# Patient Record
Sex: FEMALE | Race: WHITE | NOT HISPANIC OR LATINO | Employment: OTHER | URBAN - METROPOLITAN AREA
[De-identification: names, ages, dates, MRNs, and addresses within clinical notes are randomized per-mention and may not be internally consistent; named-entity substitution may affect disease eponyms.]

---

## 2018-10-18 ENCOUNTER — OFFICE VISIT (OUTPATIENT)
Dept: NEUROSURGERY | Facility: CLINIC | Age: 63
End: 2018-10-18

## 2018-10-18 VITALS — BODY MASS INDEX: 30.7 KG/M2 | HEIGHT: 66 IN | WEIGHT: 191 LBS | RESPIRATION RATE: 18 BRPM

## 2018-10-18 DIAGNOSIS — M48.04 SPINAL STENOSIS OF THORACIC REGION: ICD-10-CM

## 2018-10-18 DIAGNOSIS — M51.36 DDD (DEGENERATIVE DISC DISEASE), LUMBAR: ICD-10-CM

## 2018-10-18 DIAGNOSIS — M51.26 HNP (HERNIATED NUCLEUS PULPOSUS), LUMBAR: ICD-10-CM

## 2018-10-18 DIAGNOSIS — M47.819 MULTILEVEL FACET ARTHRITIS: ICD-10-CM

## 2018-10-18 DIAGNOSIS — M51.34 DDD (DEGENERATIVE DISC DISEASE), THORACIC: ICD-10-CM

## 2018-10-18 DIAGNOSIS — G80.1 SPASTIC DIPLEGIC CEREBRAL PALSY (HCC): ICD-10-CM

## 2018-10-18 DIAGNOSIS — G54.3 THORACIC ROOT LESION: Primary | ICD-10-CM

## 2018-10-18 PROBLEM — G80.9 CEREBRAL PALSY: Status: ACTIVE | Noted: 2018-10-18

## 2018-10-18 PROCEDURE — 99203 OFFICE O/P NEW LOW 30 MIN: CPT | Performed by: NEUROLOGICAL SURGERY

## 2018-10-18 RX ORDER — CELECOXIB 200 MG/1
200 CAPSULE ORAL DAILY
Refills: 11 | COMMUNITY
Start: 2018-08-29

## 2018-10-18 RX ORDER — LATANOPROST 50 UG/ML
1 SOLUTION/ DROPS OPHTHALMIC NIGHTLY
Refills: 11 | COMMUNITY
Start: 2018-09-08

## 2018-10-18 RX ORDER — CLONIDINE HYDROCHLORIDE 0.1 MG/1
0.1 TABLET ORAL
Refills: 0 | COMMUNITY
Start: 2018-09-20

## 2018-10-18 RX ORDER — TIZANIDINE 4 MG/1
TABLET ORAL
Refills: 1 | Status: ON HOLD | COMMUNITY
Start: 2018-10-05 | End: 2019-01-23

## 2018-10-18 RX ORDER — PREGABALIN 150 MG/1
150 CAPSULE ORAL
COMMUNITY
Start: 2008-03-12

## 2018-10-18 NOTE — PROGRESS NOTES
NAME: KADE CASH   DOS: 10/18/2018  : 1955  PCP: Felix Dahl MD    Chief Complaint:  Back pain left hip pain  Chief Complaint   Patient presents with   • Back Pain       History of Present Illness:  63 y.o. female   Is a very pleasant 63-year-old female with a complex history of cerebral palsy.  She's very active and has had quite a few at the inflow type of episodes ambulatory efforts and exercise.  She reports a pattern of increasing utilization to where she can walk even with the assistance of crutches but then will be injured and be off of her crutches for a number of months.  She reported that she saw surgeon back about 3 years ago for evaluation of spinal stenosis after a visit with Dr. Olguin and he recommended surgery but she did not pursue it.    She has a history of intermittent bowel bladder incontinence issues but nothing has changed most recently she has developed left-sided paraspinal left-sided upper hip and L5 proximal radicular type of pain doesn't radiate down the leg it's not associated with worsening foot drop and she has been limiting her activities she hasn't been through any physical therapy she's on her same medicines and her pain right now radiates and it ranges from an 2-5 out of 10    PMHX  Allergies:  Allergies   Allergen Reactions   • Percocet [Oxycodone-Acetaminophen] Hives     Medications    Current Outpatient Prescriptions:   •  pregabalin (LYRICA) 150 MG capsule, Take  by mouth., Disp: , Rfl:   •  celecoxib (CeleBREX) 200 MG capsule, TAKE 1 CAPSULE BY MOUTH EVERY DAY WITH FOOD, Disp: , Rfl: 11  •  CloNIDine (CATAPRES) 0.1 MG tablet, Take 0.1 mg by mouth 2 (Two) Times a Day., Disp: , Rfl: 0  •  latanoprost (XALATAN) 0.005 % ophthalmic solution, INSTILL 1 DROP IN EACH EYE AT BEDTIME, Disp: , Rfl: 11  •  tiZANidine (ZANAFLEX) 4 MG tablet, TAKE 3 TABLETS DAILY AT BEDTIME, Disp: , Rfl: 1  Past Medical History:  Past Medical History:   Diagnosis Date   • Arthritis    •  Cerebral palsy (CMS/HCC)    • Hypertension      Past Surgical History:  History reviewed. No pertinent surgical history.  Social Hx:  Social History   Substance Use Topics   • Smoking status: Never Smoker   • Smokeless tobacco: Never Used   • Alcohol use Yes     Family Hx:  Family History   Problem Relation Age of Onset   • Family history unknown: Yes     Review of Systems:        Review of Systems   Constitutional: Negative for activity change, appetite change, chills, diaphoresis, fatigue, fever and unexpected weight change.   HENT: Negative for congestion, dental problem, drooling, ear discharge, ear pain, facial swelling, hearing loss, mouth sores, nosebleeds, postnasal drip, rhinorrhea, sinus pressure, sneezing, sore throat, tinnitus, trouble swallowing and voice change.    Eyes: Negative for photophobia, pain, discharge, redness, itching and visual disturbance.   Respiratory: Negative for apnea, cough, choking, chest tightness, shortness of breath, wheezing and stridor.    Cardiovascular: Negative for chest pain, palpitations and leg swelling.   Gastrointestinal: Negative for abdominal distention, abdominal pain, anal bleeding, blood in stool, constipation, diarrhea, nausea, rectal pain and vomiting.   Endocrine: Negative for cold intolerance, heat intolerance, polydipsia, polyphagia and polyuria.   Genitourinary: Negative for decreased urine volume, difficulty urinating, dysuria, enuresis, flank pain, frequency, genital sores, hematuria and urgency.   Musculoskeletal: Positive for back pain. Negative for arthralgias, gait problem, joint swelling, myalgias, neck pain and neck stiffness.   Skin: Negative for color change, pallor, rash and wound.   Allergic/Immunologic: Negative for environmental allergies, food allergies and immunocompromised state.   Neurological: Negative for dizziness, tremors, seizures, syncope, facial asymmetry, speech difficulty, weakness, light-headedness, numbness and headaches.    Hematological: Negative for adenopathy. Does not bruise/bleed easily.   Psychiatric/Behavioral: Negative for agitation, behavioral problems, confusion, decreased concentration, dysphoric mood, hallucinations, self-injury, sleep disturbance and suicidal ideas. The patient is not nervous/anxious and is not hyperactive.    All other systems reviewed and are negative.       I have reviewed this note template and all pertinent parts of the review of systems social, family history, surgical history and medication list    Physical Examination:  Vitals:    10/18/18 1311   Resp: 18      General Appearance:   Well developed, well nourished, well groomed, alert, and cooperative.  Neurological examination:  Neurologic Exam  Vital signs were reviewed and documented in the chart  Patient appeared in good neurologic function with normal comprehension fluent speech  Mood and affect are normal  Sense of smell deferred    Pupils symmetric equally reactive funduscopic exam not visualized   Visual fields intact to confrontation  Extraocular movements intact  Face motor function is symmetric  Facial sensations normal  Hearing intact to finger rub hearing intact to finger rub  Tongue is midline  Palate symmetric  Swallowing normal  Shoulder shrug normal    Muscle bulk and tone normal  5 out of 5 strength no motor drift upper extremities  Gait normal intact  Negative Romberg  No clonus long tract signs or myelopathy  She has bilateral Raymond's    She is relatively areflexic in her lower extremities she has weakness in her left plantar flexor that's 1-2 out of 5          Review of Imaging/DATA:  Reviewed her MRI of her thoracic and lumbar spine there's a dorsal lesion mentioned on her scans at the T10 11 area additionally her lumbar spine has L4 5 spinal stenosis  Diagnoses/Plan:    Ms. Beltre is a 63 y.o. female   \Probable incidental [place]'s at the midthoracic area without evidence of compression and seen at the 1011 area.  She has what  I would consider to be operative spinal stenosis at L4 5 but it certainly doesn't explain her recent worsening.  She does need a cervical spine MRI from my standpoint I recommended a workup that would include hip injections a lumbar epidural steroid block and physical therapy we can contemplate an L4 decompressive laminectomy of needed and explained the risks and benefits and expected outcome from the surgery should we get to that point.  Spent a pleasure to provide neurosurgical care

## 2018-10-22 ENCOUNTER — TELEPHONE (OUTPATIENT)
Dept: NEUROSURGERY | Facility: CLINIC | Age: 63
End: 2018-10-22

## 2018-10-22 RX ORDER — DIAZEPAM 10 MG/1
10 TABLET ORAL TAKE AS DIRECTED
Qty: 2 TABLET | Refills: 0 | Status: SHIPPED | OUTPATIENT
Start: 2018-10-22 | End: 2019-01-16

## 2018-10-22 NOTE — TELEPHONE ENCOUNTER
Provider: Phil   Caller: angella   Time of call: [id number]    Phone #: [phone]   Last visit:10/18/18     Next visit: 12/03/2018           Reason for call: Pt states she is scheduled to have an MRI tomorrow and was told she could have medicine to help her relax beforehand.   [facility]          Pt called again at 9:56 requesting meds for her MRI

## 2018-10-24 ENCOUNTER — TELEPHONE (OUTPATIENT)
Dept: PAIN MEDICINE | Facility: CLINIC | Age: 63
End: 2018-10-24

## 2018-10-24 NOTE — TELEPHONE ENCOUNTER
[facility] Report #[id number]   MRI cervical spine, 10/23/2018: Cervical:  Normal cervical alignment is demonstrated.  Vertebral heights are well maintained.  Craniocervical junction is unremarkable.  There is moderate degenerative changes in the mid cervical spine.  Bone marrow signal is within normal limits, no suspicious osseous lesion is identified.  Prevertebral and paraspinal soft tissues are within normal limits.  Visualized portions of the posterior fossa are unremarkable.  Cervical cord demonstrates normal course, caliber, and signal characteristics.  No epidural fluid collection or hematoma is identified.  No area of abnormal enhancement is identified.    At C2-C3 there is no significant disc herniation or protrusion.  No central canal or neural foraminal stenosis is demonstrated.  C3-C4 reveals diffuse disc osteophyte resulting in moderate to advanced central canal stenosis and advance left neural foraminal narrowing seen.  There is moderate right neural foraminal narrowing.  C4-C5 reveals diffuse disc osteophyte and uncovertebral degenerative change causing mild to moderate central canal stenosis and advanced bilateral neural foraminal narrowing.  C5-C6 reveals ossification posterior to the C5-C6 level along with central disc also resulting in severe advanced central canal stenosis.  There is advanced bilateral neural foraminal narrowing.  C6-C7 reveals diffuse disc osteophyte and uncovertebral degenerative change causing advanced bilateral neural foraminal narrowing and moderate to advanced central canal stenosis.  At C7-T1 there is no significant disc herniation or protrusion.  No central canal or neural foraminal stenosis is demonstrated.    X-ray lumbar spine, 10/23/2018: Lateral shin and extension views of the lumbar spine are performed.  Alignment is anatomic.  There is no evidence of abnormal subluxation with the lateral flexion and extension views of the lumbar spine.  Vertebral body height is normal.   No compression anomalies are identified.  There is no evidence of spondylolisthesis.  There is moderate degenerative disc space disease at levels of L2-L3 as well as L3-L4.  Spurring is identified along the L2-L4 vertebrae.    MRI Thoracic Spine, 10/23/2018: Thoracic:  Normal alignment is demonstrated.  Vertebral heights are well-maintained.  Mild degenerative change of the thoracic spine without acute fracture or significant central canal compromise.  The marrow signal is within normal limits, and no suspicious osseous lesion is identified.  Prevertebral and paraspinal soft tissues are within normal limits.  T8 vertebral body hemangioma present.Posteroinferior T11 vertebral body hemangioma.  Previously described abnormality T 10-11 level not seen on current exam appears to be related to prominent ligamentum flavum hypertrophic change.  No epidural fluid collection or hematoma is identified.  No area of abnormal enhancement is identified.  No significant central canal stenosis is identified neural foramina are patent throughout  Visualized chest and abdomen are grossly unremarkable.    MRI Lumbar Spine, 10/08/2018: Normal alignment is demonstrated. Vertebral heights are well maintained.  Bone marrow signal intensity is unremarkable.  There is minimal Modic endplate changes demonstrated at the level of L2-L3 secondary to degenerative changes.  No compression anomalies are identified.  There is mild disc desiccation demonstrated at the levels of L2-L3, L3-L4 and L4-L5.  Mild disc space narrowing is present at the level of L2-L3.  The conus and cauda equina appear unremarkable.  L1-L2 disc space: Negative.  L2-L3 disc space: There is a broad circumferential disc protrusion at the level of L2-L3 which results in mild central canal stenosis.  The neural foramen are patent.  L3-L4 disc space: Mild circumferential disc protrusion is identified which results in minimal ventral effacement of the thecal sac.  The neural  foramen are patent.  L4-L5 disc space: There is severe facet arthropathy with hypertrophy of the ligamentum flavum.  There is mild circumferential disc protrusion.  This results in severe central canal stenosis.  There is restriction of the proximal neural foramen bilaterally.  L5-S1 disc space: Negative.    MRI Thoracic Spine, 10/08/2018: Normal alignment is demonstrated. Vertebral heights are well maintained.  There is a hemangioma suspected within the T8 vertebral body to the left with an approximate diameter of 12 mm.  Bone marrow signal in intensity of the vertebral body bone marrow is otherwise unremarkable.  Prevertebral and paraspinal soft tissues are within normal limits.  Thoracic cord demonstrates normal course, caliber, and signal characteristics.  There is an oval shaped density which projects along the posterior aspect of the thoracic spinal cord at the level of the T10-T11 disc space.  There is an oval shaped and is best demonstrated on the sagittal T2 weighted sequence.  It measures approximately 6 mm craniocaudal with an approximate anterior to posterior dimension of 2 mm.  This is identified on image #7 on series 7.  This projects external to the thoracic spinal cord.  No direct axial imaging is performed through this area.  No significant central canal stenosis is identified.  Neural foramina are patent throughout.  The disc spaces of the thoracic spine appear otherwise unremarkable.  Visualized chest and abdomen are grossly unremarkable.

## 2018-10-25 NOTE — PROGRESS NOTES
"Chief Complaint: \"Pain in my lower back.\"      History of Present Illness:   Patient: Ms. Nikole Beltre, 63 y.o. female   Referring physician: Dr. Jordon Villarreal  Reason for referral: Consultation for intractable chronic lower back and hip pain.   Pain history: Patient reports an 8-week history of lower back pain, which began after lifting weights. Pain has progressed in intensity over the past 4 weeks. Patient reports that she was able to walk with the assistance of two forearm crutches before this incident. She reports that she underwent consultation with a spine surgeon about 3 years ago for evaluation of spinal stenosis after a visit with Dr. Olguin, who recommended lumbar spine surgery but she decided against it.  Pain description: constant pain with intermittent exacerbation, described as burning, sharp and stabbing sensation.   Radiation of pain: The lower back pain radiates into the lateral aspect of the left hip and lateral aspect of the left thigh.  Pain intensity today: 4/10   Average pain intensity last week: 5/10  Pain intensity ranges from: 3/10 to 5/10  Aggravating factors: Pain increases with twisting, bending, sitting longer than 2 hours, standing longer than 5 minutes and ambulating more than 1-2 minutes.  Alleviating factors: Pain decreases with lying, heat and analgesics.     Associated symptoms:   Patient denies numbness or weakness in the lower extremities, chronic weakness and spasticity from her CP LT>RT.   Patient reports any new bladder or bowel problems.   Patient reports difficulties with her balance but denies recent falls.     Review of previous therapies and additional medical records:  Nikole Beltre has already failed the following measures, including:   Conservative measures: oral analgesics, opioids, physical therapy, ice and heat   Interventional measures: No interventional measures  Surgical measures: No history of lumbar spine surgery   Nikole Beltre underwent neurosurgical consultation with Dr." Phil on 10/18/2018, and was found to be a potential surgical candidate.  Nikole Beltre presents with significant comorbidities including anxiety, engaged in treatment, obesity, hypertension, cerebral palsy, engaged in treatment.  In terms of current analgesics, Nikole Beltre takes: Lyrica, tramadol, tizanidine, Celebrex  I have reviewed Ham Report #[id number] consistent to medication reconciliation.     Global Pain Scale 10-30-18                  Pain  9                 Feelings  13                 Clinical outcomes  15                 Activities  20                 GPS Total:  57                    Review of Diagnostic Studies:    MRI cervical spine, 10/23/2018: Normal cervical alignment.  Vertebral heights are well maintained. Craniocervical junction is unremarkable.  Moderate degenerative changes in the mid cervical spine. Bone marrow signal is within normal limits, no suspicious osseous lesion is identified. Prevertebral and paraspinal soft tissues are within normal limits. Visualized portions of the posterior fossa are unremarkable. Cervical cord demonstrates normal course, caliber, and signal characteristics.  No epidural fluid collection or hematoma is identified. No area of abnormal enhancement is identified.    C2-C3: no significant disc herniation or protrusion.  No central canal or neural foraminal stenosis is demonstrated.  C3-C4: diffuse disc osteophyte resulting in moderate to advanced central canal stenosis and advance left neural foraminal narrowing. Moderate right neural foraminal narrowing.  C4-C5: diffuse disc osteophyte and uncovertebral degenerative changes causing mild to moderate central canal stenosis and advanced bilateral neural foraminal narrowing.  C5-C6: ossification posterior to the C5-C6 level along with central disc also resulting in severe advanced central canal stenosis. There is advanced bilateral neural foraminal narrowing.  C6-C7: diffuse disc osteophyte and uncovertebral degenerative  change causing advanced bilateral neural foraminal narrowing and moderate to advanced central canal stenosis.  C7-T1: no significant disc herniation or protrusion.  No central canal or neural foraminal stenosis is demonstrated.  X-ray lumbar spine, 10/23/2018: Lateral shin and extension views of the lumbar spine are performed.  Alignment is anatomic. No evidence of abnormal subluxation with the lateral flexion and extension views of the lumbar spine. Vertebral body height is normal. No compression anomalies are identified.  There is no evidence of spondylolisthesis. There is moderate degenerative disc space disease at levels of L2-L3 as well as L3-L4.  Spurring is identified along the L2-L4 vertebrae.  MRI Thoracic Spine, 10/23/2018: Normal alignment is demonstrated. Vertebral heights are well-maintained. Mild degenerative change of the thoracic spine without acute fracture or significant central canal compromise.  The marrow signal is within normal limits, and no suspicious osseous lesion is identified. Prevertebral and paraspinal soft tissues are within normal limits. T8 vertebral body hemangioma present.Posteroinferior T11 vertebral body hemangioma.  Previously described abnormality T10-11 level not seen on current exam appears to be related to prominent ligamentum flavum hypertrophic change. No epidural fluid collection or hematoma is identified.  No area of abnormal enhancement is identified.  No significant central canal stenosis is identified neural foramina are patent throughout  Visualized chest and abdomen are grossly unremarkable.  MRI Lumbar Spine, 10/08/2018: Normal alignment is demonstrated. Vertebral heights are well maintained.  Bone marrow signal intensity is unremarkable.  There is minimal Modic endplate changes demonstrated at the level of L2-L3 secondary to degenerative changes.  No compression anomalies are identified.  There is mild disc desiccation demonstrated at the levels of L2-L3, L3-L4  and L4-L5.  Mild disc space narrowing is present at the level of L2-L3.  The conus and cauda equina appear unremarkable.  L1-L2 disc space: Negative.  L2-L3 disc space: There is a broad circumferential disc protrusion at the level of L2-L3 which results in mild central canal stenosis.  The neural foramen are patent.  L3-L4 disc space: Mild circumferential disc protrusion is identified which results in minimal ventral effacement of the thecal sac.  The neural foramen are patent.  L4-L5 disc space: There is severe facet arthropathy with hypertrophy of the ligamentum flavum.  There is mild circumferential disc protrusion.  This results in severe central canal stenosis.  There is restriction of the proximal neural foramen bilaterally.  L5-S1 disc space: Negative.  MRI Thoracic Spine, 10/08/2018: Normal alignment is demonstrated. Vertebral heights are well maintained.  There is a hemangioma suspected within the T8 vertebral body to the left with an approximate diameter of 12 mm.  Bone marrow signal in intensity of the vertebral body bone marrow is otherwise unremarkable.  Prevertebral and paraspinal soft tissues are within normal limits.  Thoracic cord demonstrates normal course, caliber, and signal characteristics.  There is an oval shaped density which projects along the posterior aspect of the thoracic spinal cord at the level of the T10-T11 disc space.  There is an oval shaped and is best demonstrated on the sagittal T2 weighted sequence.  It measures approximately 6 mm craniocaudal with an approximate anterior to posterior dimension of 2 mm.  This is identified on image #7 on series 7.  This projects external to the thoracic spinal cord.  No direct axial imaging is performed through this area.  No significant central canal stenosis is identified.  Neural foramina are patent throughout.  The disc spaces of the thoracic spine appear otherwise unremarkable.  Visualized chest and abdomen are grossly  unremarkable.    Review of Systems   Constitutional: Positive for fatigue.   Genitourinary: Positive for urgency.   Musculoskeletal: Positive for back pain.   Psychiatric/Behavioral: Positive for sleep disturbance. The patient is nervous/anxious.    All other systems reviewed and are negative.     Patient Active Problem List   Diagnosis   • Cerebral palsy (CMS/HCC)   • Lumbar stenosis with neurogenic claudication   • Spondylosis of lumbar region without myelopathy or radiculopathy   • DDD (degenerative disc disease), lumbar   • Hypertrophy of ligamentum flavum (CMS/HCC)   • Greater trochanteric bursitis of left hip   • At high risk for falls   • Abnormality of gait due to impairment of balance       Past Medical History:   Diagnosis Date   • Arthritis    • Cerebral palsy (CMS/HCC)    • Hypertension          No past surgical history on file.      Family History   Problem Relation Age of Onset   • Family history unknown: Yes         Social History     Social History   • Marital status:      Social History Main Topics   • Smoking status: Never Smoker   • Smokeless tobacco: Never Used   • Alcohol use Yes   • Drug use: No   • Sexual activity: Defer     Other Topics Concern   • Not on file           Current Outpatient Prescriptions:   •  celecoxib (CeleBREX) 200 MG capsule, TAKE 1 CAPSULE BY MOUTH EVERY DAY WITH FOOD, Disp: , Rfl: 11  •  CloNIDine (CATAPRES) 0.1 MG tablet, Take 0.1 mg by mouth 2 (Two) Times a Day., Disp: , Rfl: 0  •  diazePAM (VALIUM) 10 MG tablet, Take 1 tablet by mouth Take As Directed. Take one tablet 30min prior to MRI and one tablet PRN, Disp: 2 tablet, Rfl: 0  •  latanoprost (XALATAN) 0.005 % ophthalmic solution, INSTILL 1 DROP IN EACH EYE AT BEDTIME, Disp: , Rfl: 11  •  pregabalin (LYRICA) 150 MG capsule, Take  by mouth., Disp: , Rfl:   •  tiZANidine (ZANAFLEX) 4 MG tablet, TAKE 3 TABLETS DAILY AT BEDTIME, Disp: , Rfl: 1  •  traMADol (ULTRAM) 50 MG tablet, Take 50 mg by mouth Every 6 (Six)  "Hours As Needed for Moderate Pain ., Disp: , Rfl:       Allergies   Allergen Reactions   • Percocet [Oxycodone-Acetaminophen] Hives         /82   Pulse 82   Temp 96.4 °F (35.8 °C) (Temporal Artery )   Resp 18   Ht 167.6 cm (66\")   Wt 88.1 kg (194 lb 3.2 oz)   SpO2 98%   BMI 31.34 kg/m²       Physical Exam:  Constitutional: Patient is oriented to person, place, and time. Patient appears well-developed and well-nourished.   HEENT: Head: Normocephalic and atraumatic. Eyes: Conjunctivae and lids are normal. Pupils: Equal, round, reactive to light.   Neck: Trachea normal. Neck supple. No JVD present.   Pulmonary Respiratory effort: No increased work of breathing or signs of respiratory distress. Auscultation of lungs: Clear to auscultation.   Cardiovascular Auscultation of heart: Normal rate and rhythm, normal S1 and S2, no murmurs.   Peripheral vascular exam: Normal.   Musculoskeletal   Gait and station: Gait evaluation demonstrated spastic gait limited ambulation  Lumbar spine: Passive and active range of motion are limited secondary to pain. Flexion, lateral flexion, rotation of the lumbar spine increased and reproduced pain. Lumbar facet joint loading maneuvers are equivocal.  Darek and Gaenslen's tests are deferred (spasticity in adductors)  Piriformis maneuvers are negative   Palpation of the bilateral ischial tuberosities, unrevealing   Palpation of the bilateral greater trochanter, reveals tenderness on the left   Examination of the Iliotibial band: reveals tenderness on the left   Hip joints: The range of motion of the hip joints is limited secondary to spasticity  Neurological: Patient is alert and oriented to person, place, and time. Speech: speech is normal. Cortical function: Normal mental status.   Cranial nerves: Cranial nerves 2-12 intact.   Reflex Scores:  Right patellar: 3+  Left patellar: 3+  Right Achilles: 1+  Left Achilles: 1+  Motor strength: 5/5, except dorsiflexion and plantar " flexion of her feet.  Motor Tone: normal tone normal in the upper body. Spasticity mainly adductors, hamstrings.   Involuntary movements: none.   Superficial/Primitive Reflexes: primitive reflexes were absent.   Right Stern: absent  Left Stern: absent  Right ankle clonus: absent  Left ankle clonus: absent   Negative long tract signs. Straight leg raising test is negative. Femoral stretch sign is negative.   Sensation: No sensory loss. Sensory exam: intact to light touch, intact pain and temperature sensation, intact vibration sensation and  normal proprioception.   Coordination: Normal finger to nose. Lower body limited due to impaired gait and balance   Skin and subcutaneous tissue: Skin is warm and intact. No rash noted. No cyanosis.   Psychiatric: Judgment and insight: Normal. Orientation to person, place and time: Normal. Recent and remote memory: Intact. Mood and affect: Normal.     ASSESSMENT:   1. Lumbar stenosis with neurogenic claudication    2. Greater trochanteric bursitis of left hip    3. Spondylosis of lumbar region without myelopathy or radiculopathy    4. DDD (degenerative disc disease), lumbar    5. Hypertrophy of ligamentum flavum (CMS/HCC)    6. Spastic diplegic cerebral palsy (CMS/HCC)    7. At high risk for falls    8. Abnormality of gait due to impairment of balance      PLAN/MEDICAL DECISION MAKING: I had a lengthy conversation with Ms. Nikole Beltre regarding her chronic pain condition and potential therapeutic options including risks, benefits, alternative therapies, to name a few. Patient has failed to obtain pain relief with conservative measures, as referenced above. Lumbar MRI revealed advanced facet arthropathy, DDD, severe stenosis at L4-L5. Patient presents with a history of cerebral palsy with spasticity in her lower extremities. Since onset of this episode, she has been limited in her ability to walk with her crutches. I have reviewed all available patient's medical records as well as  previous therapies as referenced above.  Therefore, I have proposed the following plan:  1. Pharmacological measures: Reviewed. Discussed.   A. Patient takes Lyrica, tramadol, tizanidine, Celebrex  B. Trial with prilocaine 2%, lidocaine 10%, imipramine 3%, capsaicin 0.001% and mannitol 20%  cream, apply 1 to 2 grams of cream to the affected areas every 4 to 6 hours as needed  2. Interventional pain management measures: Patient will be scheduled for diagnostic and therapeutic bilateral L4-L5 transforaminal epidural steroid injection. We may repeat another epidural depending on patient's outcome.  Patient will follow-up with Dr. Villarreal thereafter for possible lumbar decompression at L4-L5.  If patient continues to struggle with left hip pain, we have discussed the possibility of left greater trochanteric bursa injection.  In addition, we've briefly discussed therapies for treatment of her chronic spasticity including intrathecal baclofen therapy  3. Long-term rehabilitation efforts:  A. The patient does not have a history of falls. I did complete a risk assessment for falls. Patient has risk factors. Fall precautions: Patient has been instructed regarding universal fall precautions, such as;   · Using gait aids two crutches at the appropriate height at all times for ambulation or wheelchair  · Removing all area rugs and coffee tables to create a safe environment at home  · Ensure clean, dry floors  · Wearing supportive footwear and properly fitting clothing  · Ensure bed/chair is appropriate height and patient's feet can touch the floor  · Using a shower transfer bench  · Using walk-in shower and having shower safety bars installed  · Ensure proper lighting, minimize glare  · Have nightlights operational and in use  · Participation in an exercise program for gait training, balance training and strength  · Ensure proper compliance and organization of medications to avoid errors   · Avoid use of over the counter  sedatives and alcohol consumption  · Ensure easy access to call bell, glasses, TV control, telephone  · Ensure glasses/hearing aids are in use or close by (on top of night table)  B.   Patient will start a comprehensive physical therapy program for water therapy, therapeutic exercise, core strengthening, gait and balance training, myofascial release, cupping and dry needling   C.   Resume water therapy and swimming  D.   Contrast therapy: Apply ice-packs for 15-20 minutes, followed by heating pads for 15-20 minutes to affected area   4.  The patient and her family have been instructed to contact my office with any questions or difficulties. The patient understands the plan and agrees to proceed accordingly.       Patient Care Team:  Felix Dahl MD as PCP - General (Family Medicine)  Damion Marie MD as Consulting Physician (Pain Medicine)  Jordon Villarreal MD as Consulting Physician (Neurosurgery)     No orders of the defined types were placed in this encounter.        Future Appointments  Date Time Provider Department Center   12/3/2018 8:30 AM Jordon Villarreal MD [facility] None         Damion Marie MD     EMR Dragon/Transcription disclaimer:  Much of this encounter note is an electronic transcription of spoken language to printed text. Electronic transcription of spoken language may permit erroneous, or at times, nonsensical words or phrases to be inadvertently transcribed. Although I have reviewed the note for such errors, some may still exist.

## 2018-10-29 PROBLEM — M51.36 DDD (DEGENERATIVE DISC DISEASE), LUMBAR: Status: ACTIVE | Noted: 2018-10-29

## 2018-10-29 PROBLEM — M24.28 HYPERTROPHY OF LIGAMENTUM FLAVUM: Status: ACTIVE | Noted: 2018-10-29

## 2018-10-29 PROBLEM — M48.062 LUMBAR STENOSIS WITH NEUROGENIC CLAUDICATION: Status: ACTIVE | Noted: 2018-10-29

## 2018-10-29 PROBLEM — M47.816 SPONDYLOSIS OF LUMBAR REGION WITHOUT MYELOPATHY OR RADICULOPATHY: Status: ACTIVE | Noted: 2018-10-29

## 2018-10-30 ENCOUNTER — OFFICE VISIT (OUTPATIENT)
Dept: PAIN MEDICINE | Facility: CLINIC | Age: 63
End: 2018-10-30

## 2018-10-30 VITALS
HEIGHT: 66 IN | BODY MASS INDEX: 31.21 KG/M2 | DIASTOLIC BLOOD PRESSURE: 82 MMHG | OXYGEN SATURATION: 98 % | RESPIRATION RATE: 18 BRPM | HEART RATE: 82 BPM | TEMPERATURE: 96.4 F | WEIGHT: 194.2 LBS | SYSTOLIC BLOOD PRESSURE: 128 MMHG

## 2018-10-30 DIAGNOSIS — M48.062 LUMBAR STENOSIS WITH NEUROGENIC CLAUDICATION: Primary | ICD-10-CM

## 2018-10-30 DIAGNOSIS — G80.1 SPASTIC DIPLEGIC CEREBRAL PALSY (HCC): ICD-10-CM

## 2018-10-30 DIAGNOSIS — M47.816 SPONDYLOSIS OF LUMBAR REGION WITHOUT MYELOPATHY OR RADICULOPATHY: ICD-10-CM

## 2018-10-30 DIAGNOSIS — M24.28 HYPERTROPHY OF LIGAMENTUM FLAVUM: ICD-10-CM

## 2018-10-30 DIAGNOSIS — M51.36 DDD (DEGENERATIVE DISC DISEASE), LUMBAR: ICD-10-CM

## 2018-10-30 DIAGNOSIS — R26.89 ABNORMALITY OF GAIT DUE TO IMPAIRMENT OF BALANCE: ICD-10-CM

## 2018-10-30 DIAGNOSIS — Z91.81 AT HIGH RISK FOR FALLS: ICD-10-CM

## 2018-10-30 DIAGNOSIS — M70.62 GREATER TROCHANTERIC BURSITIS OF LEFT HIP: ICD-10-CM

## 2018-10-30 DIAGNOSIS — M48.062 LUMBAR STENOSIS WITH NEUROGENIC CLAUDICATION: ICD-10-CM

## 2018-10-30 PROCEDURE — 99204 OFFICE O/P NEW MOD 45 MIN: CPT | Performed by: ANESTHESIOLOGY

## 2018-10-30 RX ORDER — TRAMADOL HYDROCHLORIDE 50 MG/1
50 TABLET ORAL EVERY 6 HOURS PRN
COMMUNITY
End: 2021-11-11 | Stop reason: HOSPADM

## 2018-11-07 ENCOUNTER — OUTSIDE FACILITY SERVICE (OUTPATIENT)
Dept: PAIN MEDICINE | Facility: CLINIC | Age: 63
End: 2018-11-07

## 2018-11-07 PROCEDURE — 99152 MOD SED SAME PHYS/QHP 5/>YRS: CPT | Performed by: ANESTHESIOLOGY

## 2018-11-07 PROCEDURE — 64483 NJX AA&/STRD TFRM EPI L/S 1: CPT | Performed by: ANESTHESIOLOGY

## 2018-11-08 ENCOUNTER — TELEPHONE (OUTPATIENT)
Dept: PAIN MEDICINE | Facility: CLINIC | Age: 63
End: 2018-11-08

## 2018-11-08 NOTE — TELEPHONE ENCOUNTER
Patient had dx/tx bilateral L4-L5 TFESI on 11/07/2018. Patient reports that she has some soreness and has been using ice.

## 2018-11-20 NOTE — PROGRESS NOTES
"Chief Complaint: \"I have lower back pain.\"    History of Present Illness:   Patient: Ms. Nikole Beltre, 63 y.o. female with an 8-week history of lower back pain, which began after lifting weights.  She presents today for post-procedure follow-up.  Patient was last seen on 11/07/2018 for bilateral L4-L5 transforaminal epidural steroid injection and experienced 10-15% of lower back pain relief that is ongoing.  Patient is participating in Physical Therapy.  Pain description: constant pain with intermittent exacerbation, described as burning, sharp and stabbing sensation.   Radiation of pain: The lower back pain radiated into the lateral aspect of the left hip and lateral aspect of the left thigh.  Pain intensity today: 3/10   Average pain intensity last week: 5/10  Pain intensity ranges from: 2/10 to 4/10  Aggravating factors: Pain increases with twisting, bending, sitting longer than 2 hours, standing longer than 5 minutes and ambulating more than 1-2 minutes.  Alleviating factors: Pain decreases with lying, heat, and analgesics.     Associated symptoms:   Patient reports chronic weakness and spasticity in the lower extremities secondary to CP (L>R).   Patient denies any new bladder or bowel problems.   Patient reports difficulties with her balance but denies recent falls.     Review of previous therapies and additional medical records:  Nikole Beltre has already failed the following measures, including:   Conservative measures: oral analgesics, opioids, physical therapy, ice and heat   Interventional measures: As referenced above.  Surgical measures: No history of lumbar spine surgery   Nikole Beltre underwent neurosurgical consultation with Dr. Villarreal on 10/18/2018, and was found to be a potential surgical candidate.  Nikole Beltre presents with significant comorbidities including anxiety, engaged in treatment, obesity, hypertension, cerebral palsy, engaged in treatment.  In terms of current analgesics, Nikole Beltre takes: Lyrica, tramadol, " tizanidine, Celebrex  I have reviewed Ham Report #[id number] consistent to medication reconciliation.     Global Pain Scale 10-30-18   11-               Pain  9  6               Feelings  13  13               Clinical outcomes  15  18               Activities  20  8               GPS Total:  57  45                  Review of Diagnostic Studies:    MRI cervical spine- 10/23/2018: Moderate multilevel degenerative changes with multilevel advanced central canal stenosis advanced neural foraminal narrowing from underlying degenerative change posterior calcification.    X-ray lumbar spine- 10/23/2018: No evidence of abnormal motion with lateral flexion and extension views the lumbar spine.  There is degenerative disc space disease which is most pronounced at the level of L2-3.    MRI Thoracic Spine- 10/23/2018: No acute traumatic injury or lesion in the thoracic spine.  Prominent ligamentum flavum hypertrophy at T10-11 level noted.    MRI Lumbar Spine- 10/08/2018: There is an oval shaped density which projects posterior to the thoracic spinal cord at the level of T10-11.  Demonstrate signal intensity similar to the thoracic spinal cord on the T2 weighted sequence.  No direct axial imaging is performed through this area.  For further assessment, recommend direct axial imaging through this lesion on the T2 and T1 weighted sequence.  If this is a persistent lesion in gadolinium would be to be administered as well.  Severe central canal stenosis at the level of L4-5 with severe restriction of the proximal neural foramen bilaterally.    Review of Systems   Constitutional: Positive for activity change, diaphoresis and fatigue.   HENT: Positive for hearing loss.    Eyes: Positive for visual disturbance.   Endocrine: Positive for cold intolerance.   Musculoskeletal: Positive for back pain.   Psychiatric/Behavioral: The patient is nervous/anxious.    All other systems reviewed and are negative.     Patient Active Problem  List   Diagnosis   • Cerebral palsy (CMS/HCC)   • Lumbar stenosis with neurogenic claudication   • Spondylosis of lumbar region without myelopathy or radiculopathy   • DDD (degenerative disc disease), lumbar   • Hypertrophy of ligamentum flavum (CMS/HCC)   • Greater trochanteric bursitis of left hip   • At high risk for falls   • Abnormality of gait due to impairment of balance       Past Medical History:   Diagnosis Date   • Arthritis    • Cerebral palsy (CMS/HCC)    • Hypertension          No past surgical history on file.      Family History   Family history unknown: Yes         Social History     Socioeconomic History   • Marital status:      Spouse name: Not on file   • Number of children: Not on file   • Years of education: Not on file   • Highest education level: Not on file   Tobacco Use   • Smoking status: Never Smoker   • Smokeless tobacco: Never Used   Substance and Sexual Activity   • Alcohol use: Yes   • Drug use: No   • Sexual activity: Defer           Current Outpatient Medications:   •  celecoxib (CeleBREX) 200 MG capsule, TAKE 1 CAPSULE BY MOUTH EVERY DAY WITH FOOD, Disp: , Rfl: 11  •  CloNIDine (CATAPRES) 0.1 MG tablet, Take 0.1 mg by mouth 2 (Two) Times a Day., Disp: , Rfl: 0  •  diazePAM (VALIUM) 10 MG tablet, Take 1 tablet by mouth Take As Directed. Take one tablet 30min prior to MRI and one tablet PRN, Disp: 2 tablet, Rfl: 0  •  FLUoxetine (PROzac) 20 MG capsule, Take 20 mg by mouth Daily., Disp: , Rfl:   •  Gel Base gel, CAPSAICIN 0.001% IMIPRAMINE 3% LIDOCAINE 10% PRILOCAINE 2% MANNITOL 20%. APPLY 1-2 G TO AFFECTED AREA 3-4 TIMES DAILY, Disp: 240 g, Rfl: PRN  •  latanoprost (XALATAN) 0.005 % ophthalmic solution, INSTILL 1 DROP IN EACH EYE AT BEDTIME, Disp: , Rfl: 11  •  pregabalin (LYRICA) 150 MG capsule, Take  by mouth., Disp: , Rfl:   •  tiZANidine (ZANAFLEX) 4 MG tablet, TAKE 3 TABLETS DAILY AT BEDTIME, Disp: , Rfl: 1  •  traMADol (ULTRAM) 50 MG tablet, Take 50 mg by mouth Every 6  "(Six) Hours As Needed for Moderate Pain ., Disp: , Rfl:       Allergies   Allergen Reactions   • Percocet [Oxycodone-Acetaminophen] Hives         /84   Pulse 78   Temp 97.8 °F (36.6 °C) (Temporal)   Resp 18   Ht 167.6 cm (66\")   SpO2 98%   BMI 31.34 kg/m²       Physical Exam:  Constitutional: Patient is oriented to person, place, and time.  Patient appears well-developed and well-nourished.   HEENT: Head: Normocephalic and atraumatic. Eyes: Conjunctivae and lids are normal. Pupils: Equal, round, and  reactive to light.   Neck: Trachea normal. Neck supple. No JVD present.   Pulmonary: No increased work of breathing or signs of respiratory distress.  Clear to auscultation bilaterally.   Cardiovascular: Normal rate and rhythm, normal S1 and S2, no murmurs.   Peripheral vascular exam: Normal.   Musculoskeletal   Gait and station: Limited ambulation with spastic gait  Lumbar spine: Passive and active range of motion are limited secondary to pain. Flexion, lateral flexion, rotation of the lumbar spine increased and reproduced pain. Lumbar facet joint loading maneuvers are negative.  Darek and Gaenslen's tests are deferred due to spasticity in adductors.   Left greater trochanter is minimally tender to palpation.  Hip joints: The range of motion of the hip joints is limited secondary to spasticity  Neurological: Patient is alert and oriented to person, place, and time. Speech: speech is normal. Cortical function: Normal mental status.   Cranial nerves: Cranial nerves 2-12 intact.   Reflex Scores:  Patellar: 3+ bilaterally  Achilles: 1+ bilaterally  Motor strength: 5/5, except dorsiflexion and plantar flexion.  Motor Tone: normal tone normal in the upper body. Spasticity noted in adductors and hamstrings.   Involuntary movements: none.   Right ankle clonus: absent  Left ankle clonus: absent   Negative long tract signs. Straight leg raising test is negative.    Sensation: No sensory loss. Sensory exam: intact " to light touch, intact pain and temperature sensation, intact vibration sensation and  normal proprioception.   Coordination: Normal finger to nose. Lower body limited due to impaired gait and balance   Skin and subcutaneous tissue: Skin is warm and intact. No rash noted. No cyanosis.   Psychiatric: Judgment and insight: Normal. Orientation to person, place and time: Normal. Recent and remote memory: Intact. Mood and affect: Normal.     ASSESSMENT:   1. Lumbar stenosis with neurogenic claudication    2. Greater trochanteric bursitis of left hip    3. Spondylosis of lumbar region without myelopathy or radiculopathy    4. DDD (degenerative disc disease), lumbar    5. Hypertrophy of ligamentum flavum (CMS/HCC)    6. Spastic diplegic cerebral palsy (CMS/HCC)    7. At high risk for falls    8. Abnormality of gait due to impairment of balance      PLAN/MEDICAL DECISION MAKING:    I have reviewed all available medical records as well as previous therapies as referenced above, and discussed the patient with Dr. Marie.  1. Interventional pain management measures: None scheduled.  Patient is scheduled for follow-up with Dr. Villarreal on 12/03/2018.  If she is not found to be a surgical candidate, we could repeat bilateral L4-L5 transforaminal epidural steroid injection if recommended.  Dr. Marie previously discussed the possibility of left greater trochanteric bursa injection and intrathecal baclofen therapy for her chronic spasticity.  2. Pharmacological measures: Reviewed and discussed.   A. Continue prilocaine 2%, lidocaine 10%, imipramine 3%, capsaicin 0.001% and mannitol 20%  cream, apply 1 to 2 grams of cream to the affected areas every 4 to 6 hours as needed  3. Long-term rehabilitation efforts:  A.   Patient will continue a comprehensive physical therapy program for water therapy, therapeutic exercise, core strengthening, gait and balance training, myofascial release, cupping and dry needling   B.   Resume water  therapy and swimming  C.   Contrast therapy: Apply ice-packs for 15-20 minutes, followed by heating pads for 15-20 minutes to affected area   4.  The patient and her family have been instructed to contact my office with any questions or difficulties. The patient understands the plan and agrees to proceed accordingly.       Patient Care Team:  Felix Dahl MD as PCP - General (Family Medicine)  Damion Marie MD as Consulting Physician (Pain Medicine)  Jordon Villarreal MD as Consulting Physician (Neurosurgery)     No orders of the defined types were placed in this encounter.        Future Appointments   Date Time Provider Department Center   12/3/2018  2:30 PM Jordon Villarreal MD [facility] None         Mendoza Corley PA-C     EMR Dragon/Transcription disclaimer:  Much of this encounter note is an electronic transcription of spoken language to printed text. Electronic transcription of spoken language may permit erroneous, or at times, nonsensical words or phrases to be inadvertently transcribed. Although I have reviewed the note for such errors, some may still exist.

## 2018-11-29 ENCOUNTER — OFFICE VISIT (OUTPATIENT)
Dept: PAIN MEDICINE | Facility: CLINIC | Age: 63
End: 2018-11-29

## 2018-11-29 VITALS
OXYGEN SATURATION: 98 % | DIASTOLIC BLOOD PRESSURE: 84 MMHG | HEART RATE: 78 BPM | RESPIRATION RATE: 18 BRPM | HEIGHT: 66 IN | TEMPERATURE: 97.8 F | SYSTOLIC BLOOD PRESSURE: 118 MMHG | BODY MASS INDEX: 31.34 KG/M2

## 2018-11-29 DIAGNOSIS — M47.816 SPONDYLOSIS OF LUMBAR REGION WITHOUT MYELOPATHY OR RADICULOPATHY: ICD-10-CM

## 2018-11-29 DIAGNOSIS — G80.1 SPASTIC DIPLEGIC CEREBRAL PALSY (HCC): ICD-10-CM

## 2018-11-29 DIAGNOSIS — M70.62 GREATER TROCHANTERIC BURSITIS OF LEFT HIP: ICD-10-CM

## 2018-11-29 DIAGNOSIS — R26.89 ABNORMALITY OF GAIT DUE TO IMPAIRMENT OF BALANCE: ICD-10-CM

## 2018-11-29 DIAGNOSIS — M48.062 LUMBAR STENOSIS WITH NEUROGENIC CLAUDICATION: Primary | ICD-10-CM

## 2018-11-29 DIAGNOSIS — Z91.81 AT HIGH RISK FOR FALLS: ICD-10-CM

## 2018-11-29 DIAGNOSIS — M51.36 DDD (DEGENERATIVE DISC DISEASE), LUMBAR: ICD-10-CM

## 2018-11-29 DIAGNOSIS — M24.28 HYPERTROPHY OF LIGAMENTUM FLAVUM: ICD-10-CM

## 2018-11-29 PROCEDURE — 99213 OFFICE O/P EST LOW 20 MIN: CPT | Performed by: PHYSICIAN ASSISTANT

## 2018-11-29 RX ORDER — FLUOXETINE HYDROCHLORIDE 20 MG/1
20 CAPSULE ORAL DAILY
COMMUNITY
End: 2021-04-01

## 2018-12-10 ENCOUNTER — DOCUMENTATION (OUTPATIENT)
Dept: NEUROSURGERY | Facility: CLINIC | Age: 63
End: 2018-12-10

## 2018-12-10 NOTE — PROGRESS NOTES
Dr. Villarreal ordered MRI of Cervical & Thoracic spine. Pt did not bring scans with her to appointment today.     Pt will drop off scans this week, Phil Gibbs will review and we will call patient with next step.      454pm 12/10/18- Pt's  dropped off scans. Dr. Villarreal should be able to review on Thursday during clinic.      12/13/18 3:50P- Dr. Villarreal reviewed scans, he will see pt Monday morning, Tarsha will schedule.

## 2018-12-13 DIAGNOSIS — M50.30 DDD (DEGENERATIVE DISC DISEASE), CERVICAL: Primary | ICD-10-CM

## 2018-12-17 ENCOUNTER — APPOINTMENT (OUTPATIENT)
Dept: OTHER | Facility: HOSPITAL | Age: 63
End: 2018-12-17
Attending: NEUROLOGICAL SURGERY

## 2018-12-17 ENCOUNTER — PREP FOR SURGERY (OUTPATIENT)
Dept: OTHER | Facility: HOSPITAL | Age: 63
End: 2018-12-17

## 2018-12-17 ENCOUNTER — OFFICE VISIT (OUTPATIENT)
Dept: NEUROSURGERY | Facility: CLINIC | Age: 63
End: 2018-12-17

## 2018-12-17 ENCOUNTER — HOSPITAL ENCOUNTER (OUTPATIENT)
Dept: CT IMAGING | Facility: HOSPITAL | Age: 63
Discharge: HOME | End: 2018-12-17
Attending: NEUROLOGICAL SURGERY | Admitting: NEUROLOGICAL SURGERY

## 2018-12-17 VITALS — TEMPERATURE: 97.8 F | WEIGHT: 194 LBS | HEIGHT: 66 IN | BODY MASS INDEX: 31.18 KG/M2 | RESPIRATION RATE: 18 BRPM

## 2018-12-17 DIAGNOSIS — M50.30 DDD (DEGENERATIVE DISC DISEASE), CERVICAL: ICD-10-CM

## 2018-12-17 DIAGNOSIS — M53.9 MULTILEVEL DEGENERATIVE DISC DISEASE: Primary | ICD-10-CM

## 2018-12-17 DIAGNOSIS — G95.89: ICD-10-CM

## 2018-12-17 DIAGNOSIS — G54.3 THORACIC ROOT LESION: ICD-10-CM

## 2018-12-17 DIAGNOSIS — M48.062 SPINAL STENOSIS OF LUMBAR REGION WITH NEUROGENIC CLAUDICATION: ICD-10-CM

## 2018-12-17 DIAGNOSIS — M47.12 CERVICAL SPONDYLOSIS WITH MYELOPATHY: Primary | ICD-10-CM

## 2018-12-17 DIAGNOSIS — T50.905A: ICD-10-CM

## 2018-12-17 PROCEDURE — 72125 CT NECK SPINE W/O DYE: CPT

## 2018-12-17 PROCEDURE — 99214 OFFICE O/P EST MOD 30 MIN: CPT | Performed by: NEUROLOGICAL SURGERY

## 2018-12-17 RX ORDER — ACETAMINOPHEN 325 MG/1
650 TABLET ORAL ONCE
Status: CANCELLED | OUTPATIENT
Start: 2018-12-17 | End: 2018-12-17

## 2018-12-17 RX ORDER — CHLORHEXIDINE GLUCONATE 4 G/100ML
SOLUTION TOPICAL
Qty: 120 ML | Refills: 0 | Status: ON HOLD | OUTPATIENT
Start: 2018-12-17 | End: 2019-01-23

## 2018-12-17 RX ORDER — CEFAZOLIN SODIUM 2 G/100ML
2 INJECTION, SOLUTION INTRAVENOUS ONCE
Status: CANCELLED | OUTPATIENT
Start: 2018-12-17 | End: 2018-12-17

## 2018-12-17 RX ORDER — SODIUM CHLORIDE 0.9 % (FLUSH) 0.9 %
3 SYRINGE (ML) INJECTION EVERY 12 HOURS SCHEDULED
Status: CANCELLED | OUTPATIENT
Start: 2018-12-17

## 2018-12-17 RX ORDER — FAMOTIDINE 20 MG/1
20 TABLET, FILM COATED ORAL
Status: CANCELLED | OUTPATIENT
Start: 2018-12-17

## 2018-12-17 RX ORDER — SODIUM CHLORIDE AND POTASSIUM CHLORIDE 150; 900 MG/100ML; MG/100ML
100 INJECTION, SOLUTION INTRAVENOUS CONTINUOUS
Status: CANCELLED | OUTPATIENT
Start: 2018-12-17

## 2018-12-17 RX ORDER — HYDROCODONE BITARTRATE AND ACETAMINOPHEN 7.5; 325 MG/1; MG/1
1 TABLET ORAL ONCE
Status: CANCELLED | OUTPATIENT
Start: 2018-12-17 | End: 2018-12-17

## 2018-12-17 RX ORDER — IBUPROFEN 800 MG/1
800 TABLET ORAL ONCE
Status: CANCELLED | OUTPATIENT
Start: 2018-12-17 | End: 2018-12-17

## 2018-12-17 RX ORDER — SODIUM CHLORIDE 0.9 % (FLUSH) 0.9 %
1-10 SYRINGE (ML) INJECTION AS NEEDED
Status: CANCELLED | OUTPATIENT
Start: 2018-12-17

## 2018-12-17 NOTE — PROGRESS NOTES
NAME: KADE CASH   DOS: 2018  : 1955  PCP: Felix Dahl MD    Chief Complaint:    Chief Complaint   Patient presents with   • Left hip pain   • Cerebral Palsy   • Thoracic root lesion     F/u CT lumbar, MRI Cervical, Thoracic.       History of Present Illness:  63 y.o. female   Follow-up lumbar spinal stenosis L4 5 as well as cervical myelopathy no new symptoms today    PMHX  Allergies:  Allergies   Allergen Reactions   • Percocet [Oxycodone-Acetaminophen] Hives     Medications    Current Outpatient Medications:   •  celecoxib (CeleBREX) 200 MG capsule, TAKE 1 CAPSULE BY MOUTH EVERY DAY WITH FOOD, Disp: , Rfl: 11  •  CloNIDine (CATAPRES) 0.1 MG tablet, Take 0.1 mg by mouth 2 (Two) Times a Day., Disp: , Rfl: 0  •  diazePAM (VALIUM) 10 MG tablet, Take 1 tablet by mouth Take As Directed. Take one tablet 30min prior to MRI and one tablet PRN, Disp: 2 tablet, Rfl: 0  •  FLUoxetine (PROzac) 20 MG capsule, Take 20 mg by mouth Daily., Disp: , Rfl:   •  Gel Base gel, CAPSAICIN 0.001% IMIPRAMINE 3% LIDOCAINE 10% PRILOCAINE 2% MANNITOL 20%. APPLY 1-2 G TO AFFECTED AREA 3-4 TIMES DAILY, Disp: 240 g, Rfl: PRN  •  latanoprost (XALATAN) 0.005 % ophthalmic solution, INSTILL 1 DROP IN EACH EYE AT BEDTIME, Disp: , Rfl: 11  •  pregabalin (LYRICA) 150 MG capsule, Take  by mouth., Disp: , Rfl:   •  tiZANidine (ZANAFLEX) 4 MG tablet, TAKE 3 TABLETS DAILY AT BEDTIME, Disp: , Rfl: 1  •  traMADol (ULTRAM) 50 MG tablet, Take 50 mg by mouth Every 6 (Six) Hours As Needed for Moderate Pain ., Disp: , Rfl:   Past Medical History:  Past Medical History:   Diagnosis Date   • Arthritis    • Cerebral palsy (CMS/HCC)    • Hypertension      Past Surgical History:  History reviewed. No pertinent surgical history.  Social Hx:  Social History     Tobacco Use   • Smoking status: Never Smoker   • Smokeless tobacco: Never Used   Substance Use Topics   • Alcohol use: Yes   • Drug use: No     Family Hx:  Family History   Family  history unknown: Yes     Review of Systems:        Review of Systems   Constitutional: Positive for fatigue. Negative for activity change, appetite change, chills, diaphoresis, fever and unexpected weight change.   HENT: Negative for congestion, dental problem, drooling, ear discharge, ear pain, facial swelling, hearing loss, mouth sores, nosebleeds, postnasal drip, rhinorrhea, sinus pressure, sneezing, sore throat, tinnitus, trouble swallowing and voice change.    Eyes: Negative for photophobia, pain, discharge, redness, itching and visual disturbance.   Respiratory: Negative for apnea, cough, choking, chest tightness, shortness of breath, wheezing and stridor.    Cardiovascular: Negative for chest pain, palpitations and leg swelling.   Gastrointestinal: Negative for abdominal distention, abdominal pain, anal bleeding, blood in stool, constipation, diarrhea, nausea, rectal pain and vomiting.   Endocrine: Negative for cold intolerance, heat intolerance, polydipsia, polyphagia and polyuria.   Genitourinary: Negative for decreased urine volume, difficulty urinating, dysuria, enuresis, flank pain, frequency, genital sores, hematuria and urgency.   Musculoskeletal: Positive for arthralgias, back pain, myalgias, neck pain and neck stiffness. Negative for gait problem and joint swelling.   Skin: Negative for color change, pallor, rash and wound.   Allergic/Immunologic: Negative for environmental allergies, food allergies and immunocompromised state.   Neurological: Positive for weakness. Negative for dizziness, tremors, seizures, syncope, facial asymmetry, speech difficulty, light-headedness, numbness and headaches.   Hematological: Negative for adenopathy. Does not bruise/bleed easily.   Psychiatric/Behavioral: Negative for agitation, behavioral problems, confusion, decreased concentration, dysphoric mood, hallucinations, self-injury, sleep disturbance and suicidal ideas. The patient is not nervous/anxious and is not  "hyperactive.    All other systems reviewed and are negative.     I have reviewed this note template and all pertinent parts of the review of systems social, family history, surgical history and medication list      Physical Examination:  Vitals:    12/17/18 0956   Resp: 18   Temp: 97.8 °F (36.6 °C)      General Appearance:   Well developed, well nourished, well groomed, alert, and cooperative.  Neurological examination:  Neurologic Exam  She has reasonable range of motion at her neck    She has good strength upper extremities may be some weakness in her tricep and intrinsic but it's very minimal she has bilateral Raymond's    Her lower extremities very significant proximal weakness lower extremities she she has no clonus but she does have decreased range of motion and weakness in her feet Review of Imaging/DATA:    Diagnoses/Plan:    Ms. Beltre is a 63 y.o. 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    From my standpoint I recommended anterior cervical decompression I think she'll need a 2 level corpectomy I think a stand-alone options okay for bone is good and I explained the risks benefits and expected outcome.  I explained she may need a posterior surgery as well we'll make arrangements for a 2 level cervical corpectomy  "

## 2018-12-19 ENCOUNTER — PREP FOR SURGERY (OUTPATIENT)
Dept: OTHER | Facility: HOSPITAL | Age: 63
End: 2018-12-19

## 2018-12-19 DIAGNOSIS — G95.9 CERVICAL MYELOPATHY (HCC): Primary | ICD-10-CM

## 2018-12-19 RX ORDER — HYDROCODONE BITARTRATE AND ACETAMINOPHEN 7.5; 325 MG/1; MG/1
1 TABLET ORAL ONCE
Status: CANCELLED | OUTPATIENT
Start: 2018-12-19 | End: 2018-12-19

## 2018-12-19 RX ORDER — CEFAZOLIN SODIUM 2 G/100ML
2 INJECTION, SOLUTION INTRAVENOUS ONCE
Status: CANCELLED | OUTPATIENT
Start: 2018-12-19 | End: 2018-12-19

## 2018-12-19 RX ORDER — SODIUM CHLORIDE 0.9 % (FLUSH) 0.9 %
3 SYRINGE (ML) INJECTION EVERY 12 HOURS SCHEDULED
Status: CANCELLED | OUTPATIENT
Start: 2018-12-19

## 2018-12-19 RX ORDER — SODIUM CHLORIDE 0.9 % (FLUSH) 0.9 %
3-10 SYRINGE (ML) INJECTION AS NEEDED
Status: CANCELLED | OUTPATIENT
Start: 2018-12-19

## 2018-12-19 RX ORDER — CHLORHEXIDINE GLUCONATE 0.12 MG/ML
15 RINSE ORAL EVERY 12 HOURS SCHEDULED
Status: CANCELLED | OUTPATIENT
Start: 2018-12-19

## 2018-12-19 RX ORDER — ACETAMINOPHEN 325 MG/1
650 TABLET ORAL ONCE
Status: CANCELLED | OUTPATIENT
Start: 2018-12-19 | End: 2018-12-19

## 2018-12-19 RX ORDER — IBUPROFEN 800 MG/1
800 TABLET ORAL ONCE
Status: CANCELLED | OUTPATIENT
Start: 2018-12-19 | End: 2018-12-19

## 2018-12-19 RX ORDER — SODIUM CHLORIDE, SODIUM LACTATE, POTASSIUM CHLORIDE, CALCIUM CHLORIDE 600; 310; 30; 20 MG/100ML; MG/100ML; MG/100ML; MG/100ML
100 INJECTION, SOLUTION INTRAVENOUS CONTINUOUS
Status: CANCELLED | OUTPATIENT
Start: 2018-12-19

## 2018-12-20 PROBLEM — G95.9 CERVICAL MYELOPATHY (HCC): Status: ACTIVE | Noted: 2018-12-20

## 2019-01-16 ENCOUNTER — APPOINTMENT (OUTPATIENT)
Dept: PREADMISSION TESTING | Facility: HOSPITAL | Age: 64
End: 2019-01-16

## 2019-01-16 VITALS — WEIGHT: 193.56 LBS | BODY MASS INDEX: 33.05 KG/M2 | HEIGHT: 64 IN

## 2019-01-16 DIAGNOSIS — G95.89: ICD-10-CM

## 2019-01-16 DIAGNOSIS — T50.905A: ICD-10-CM

## 2019-01-16 DIAGNOSIS — M47.12 CERVICAL SPONDYLOSIS WITH MYELOPATHY: ICD-10-CM

## 2019-01-16 DIAGNOSIS — G95.9 CERVICAL MYELOPATHY (HCC): ICD-10-CM

## 2019-01-16 LAB
ANION GAP SERPL CALCULATED.3IONS-SCNC: 5 MMOL/L (ref 3–11)
BACTERIA UR QL AUTO: ABNORMAL /HPF
BASOPHILS # BLD AUTO: 0.03 10*3/MM3 (ref 0–0.2)
BASOPHILS NFR BLD AUTO: 0.6 % (ref 0–1)
BILIRUB UR QL STRIP: NEGATIVE
BUN BLD-MCNC: 23 MG/DL (ref 9–23)
BUN/CREAT SERPL: 32.9 (ref 7–25)
CALCIUM SPEC-SCNC: 9.3 MG/DL (ref 8.7–10.4)
CHLORIDE SERPL-SCNC: 106 MMOL/L (ref 99–109)
CLARITY UR: CLEAR
CO2 SERPL-SCNC: 29 MMOL/L (ref 20–31)
COLOR UR: YELLOW
CREAT BLD-MCNC: 0.7 MG/DL (ref 0.6–1.3)
DEPRECATED RDW RBC AUTO: 44.1 FL (ref 37–54)
EOSINOPHIL # BLD AUTO: 0.24 10*3/MM3 (ref 0–0.3)
EOSINOPHIL NFR BLD AUTO: 4.7 % (ref 0–3)
ERYTHROCYTE [DISTWIDTH] IN BLOOD BY AUTOMATED COUNT: 13.8 % (ref 11.3–14.5)
GFR SERPL CREATININE-BSD FRML MDRD: 85 ML/MIN/1.73
GLUCOSE BLD-MCNC: 94 MG/DL (ref 70–100)
GLUCOSE UR STRIP-MCNC: NEGATIVE MG/DL
HBA1C MFR BLD: 5.6 % (ref 4.8–5.6)
HCT VFR BLD AUTO: 48.2 % (ref 34.5–44)
HGB BLD-MCNC: 15.7 G/DL (ref 11.5–15.5)
HGB UR QL STRIP.AUTO: NEGATIVE
HYALINE CASTS UR QL AUTO: ABNORMAL /LPF
IMM GRANULOCYTES # BLD AUTO: 0 10*3/MM3 (ref 0–0.03)
IMM GRANULOCYTES NFR BLD AUTO: 0 % (ref 0–0.6)
INR PPP: 0.96 (ref 0.85–1.16)
KETONES UR QL STRIP: NEGATIVE
LEUKOCYTE ESTERASE UR QL STRIP.AUTO: ABNORMAL
LYMPHOCYTES # BLD AUTO: 1.36 10*3/MM3 (ref 0.6–4.8)
LYMPHOCYTES NFR BLD AUTO: 26.7 % (ref 24–44)
MCH RBC QN AUTO: 28.3 PG (ref 27–31)
MCHC RBC AUTO-ENTMCNC: 32.6 G/DL (ref 32–36)
MCV RBC AUTO: 86.8 FL (ref 80–99)
MONOCYTES # BLD AUTO: 0.25 10*3/MM3 (ref 0–1)
MONOCYTES NFR BLD AUTO: 4.9 % (ref 0–12)
NEUTROPHILS # BLD AUTO: 3.21 10*3/MM3 (ref 1.5–8.3)
NEUTROPHILS NFR BLD AUTO: 63.1 % (ref 41–71)
NITRITE UR QL STRIP: NEGATIVE
PH UR STRIP.AUTO: 6 [PH] (ref 5–8)
PLATELET # BLD AUTO: 277 10*3/MM3 (ref 150–450)
PMV BLD AUTO: 10.6 FL (ref 6–12)
POTASSIUM BLD-SCNC: 4.3 MMOL/L (ref 3.5–5.5)
PROT UR QL STRIP: NEGATIVE
PROTHROMBIN TIME: 12.3 SECONDS (ref 11.2–14.3)
RBC # BLD AUTO: 5.55 10*6/MM3 (ref 3.89–5.14)
RBC # UR: ABNORMAL /HPF
REF LAB TEST METHOD: ABNORMAL
SODIUM BLD-SCNC: 140 MMOL/L (ref 132–146)
SP GR UR STRIP: 1.02 (ref 1–1.03)
SQUAMOUS #/AREA URNS HPF: ABNORMAL /HPF
UROBILINOGEN UR QL STRIP: ABNORMAL
WBC NRBC COR # BLD: 5.09 10*3/MM3 (ref 3.5–10.8)
WBC UR QL AUTO: ABNORMAL /HPF

## 2019-01-16 PROCEDURE — 93010 ELECTROCARDIOGRAM REPORT: CPT | Performed by: INTERNAL MEDICINE

## 2019-01-16 PROCEDURE — 85610 PROTHROMBIN TIME: CPT | Performed by: NEUROLOGICAL SURGERY

## 2019-01-16 PROCEDURE — 83036 HEMOGLOBIN GLYCOSYLATED A1C: CPT | Performed by: ANESTHESIOLOGY

## 2019-01-16 PROCEDURE — 85025 COMPLETE CBC W/AUTO DIFF WBC: CPT | Performed by: NEUROLOGICAL SURGERY

## 2019-01-16 PROCEDURE — 80048 BASIC METABOLIC PNL TOTAL CA: CPT | Performed by: NEUROLOGICAL SURGERY

## 2019-01-16 PROCEDURE — 87081 CULTURE SCREEN ONLY: CPT | Performed by: PHYSICIAN ASSISTANT

## 2019-01-16 PROCEDURE — 93005 ELECTROCARDIOGRAM TRACING: CPT

## 2019-01-16 PROCEDURE — 36415 COLL VENOUS BLD VENIPUNCTURE: CPT

## 2019-01-16 PROCEDURE — 81001 URINALYSIS AUTO W/SCOPE: CPT | Performed by: PHYSICIAN ASSISTANT

## 2019-01-16 PROCEDURE — 87086 URINE CULTURE/COLONY COUNT: CPT | Performed by: PHYSICIAN ASSISTANT

## 2019-01-16 RX ORDER — ROPINIROLE 0.5 MG/1
0.75 TABLET, FILM COATED ORAL NIGHTLY
COMMUNITY
End: 2019-02-14 | Stop reason: SDUPTHER

## 2019-01-16 RX ORDER — COLESEVELAM 180 1/1
1875 TABLET ORAL AS NEEDED
COMMUNITY

## 2019-01-16 NOTE — DISCHARGE INSTRUCTIONS
The following information and instructions were given:    Nothing to eat or drink after midnight except sips of water with routine prescribed medication (except blood thinners, certain blood pressure medications, diabetes medications, or weight reducing medications) unless otherwise instructed by your physician.  Do not eat, drink, smoke or chew gum after midnight the night before surgery. This also includes no mints.    DO NOT shave for two days before your procedure.  Do not wear makeup.      DO NOT wear fingernail polish (gel/regular) and/or acrylic/artificial nails on the day of surgery.   If a patient had recent manicure and would rather not remove polish or artificial nails, then the minimum requirement is that the polish/artificial nails must be removed from the middle finger on each hand.      If patient is having surgery/procedure on an upper extremity, then the patient was instructed that fingernail polish/artificial fingernails must be removed for surgery.  NO EXCEPTIONS.      If patient is having surgery/procedure on a lower extremity, then the patient was instructed that toenail polish on both extremities must be removed for surgery.  NO EXCEPTIONS.    Remove all jewelry (advised to go to jeweler if unable to remove).  Jewelry, especially rings, can no longer be taped for surgery.    Leave anything you consider valuable at home.    Leave your suitcase in the car until after your surgery.    Bring the following with you (if applicable)       -picture ID and insurance cards       -Co-pay/deductible required by insurance       -Medications in the original bottles (not a list) including all over-the-counter  medications if not brought to PAT       -Copy of advance directive, living will or power of  documents if not  brought to PAT       -CPAP or BIPAP mask and tubing (do not bring machine)       -Skin prep instructions sheet       -PAT Pass    Education booklet, brochure, handout or binder given to  patient.      When applicable, an ERAS booklet was given to patient.    Pain Control After Surgery handout given to patient.    Respirex use (handout given to patient) and pneumonia prevention.    Signs and Symptoms of infection discussed.    DVT Prevention education given.  Stressing the importance of ambulation.    Patient to apply Chlorhexadine wipes to surgical area (as instructed) the night before procedure and the AM of procedure.    When applicable patients with ERAS orders were instructed to drink 20 ounces of Gatorade or G2 for diabetics (or until full) the morning of surgery.  The Gatorade or G2 must be consumed at least 1 hour before arrival time on the day of surgery .  No RED Gatorade or G2.  Appropriate ERAS handout and/or booklet given to patient during PAT visit.

## 2019-01-16 NOTE — PAT
Bactroban and Chlorhexidine Prescription given during PAT visit, as well as written and verbal instructions given to patient during PAT visit.    Patient to apply Chlorhexadine wipes  to surgical area (as instructed) the night before procedure and the AM of procedure. Wipes provided.

## 2019-01-18 LAB
BACTERIA SPEC AEROBE CULT: NORMAL
MRSA SPEC QL CULT: NORMAL

## 2019-01-23 ENCOUNTER — ANESTHESIA EVENT (OUTPATIENT)
Dept: PERIOP | Facility: HOSPITAL | Age: 64
End: 2019-01-23

## 2019-01-23 ENCOUNTER — ANESTHESIA (OUTPATIENT)
Dept: PERIOP | Facility: HOSPITAL | Age: 64
End: 2019-01-23

## 2019-01-23 ENCOUNTER — HOSPITAL ENCOUNTER (INPATIENT)
Facility: HOSPITAL | Age: 64
LOS: 8 days | Discharge: NURSING, REHAB OR LONG-TERM CARE | End: 2019-01-31
Attending: NEUROLOGICAL SURGERY | Admitting: NEUROLOGICAL SURGERY

## 2019-01-23 ENCOUNTER — APPOINTMENT (OUTPATIENT)
Dept: GENERAL RADIOLOGY | Facility: HOSPITAL | Age: 64
End: 2019-01-23

## 2019-01-23 DIAGNOSIS — Z74.09 IMPAIRED MOBILITY AND ADLS: ICD-10-CM

## 2019-01-23 DIAGNOSIS — Z74.09 IMPAIRED FUNCTIONAL MOBILITY, BALANCE, GAIT, AND ENDURANCE: Primary | ICD-10-CM

## 2019-01-23 DIAGNOSIS — Z78.9 IMPAIRED MOBILITY AND ADLS: ICD-10-CM

## 2019-01-23 PROBLEM — G95.9 MYELOPATHY: Status: ACTIVE | Noted: 2019-01-23

## 2019-01-23 LAB — POTASSIUM BLDA-SCNC: 4.38 MMOL/L (ref 3.5–5.3)

## 2019-01-23 PROCEDURE — 25010000002 DEXAMETHASONE PER 1 MG: Performed by: NURSE ANESTHETIST, CERTIFIED REGISTERED

## 2019-01-23 PROCEDURE — 22554 ARTHRD ANT NTRBD MIN DSC CRV: CPT | Performed by: NEUROLOGICAL SURGERY

## 2019-01-23 PROCEDURE — 25010000003 CEFAZOLIN IN DEXTROSE 2-4 GM/100ML-% SOLUTION: Performed by: NEUROLOGICAL SURGERY

## 2019-01-23 PROCEDURE — 25010000002 ONDANSETRON PER 1 MG: Performed by: NURSE ANESTHETIST, CERTIFIED REGISTERED

## 2019-01-23 PROCEDURE — 22846 INSERT SPINE FIXATION DEVICE: CPT | Performed by: PHYSICIAN ASSISTANT

## 2019-01-23 PROCEDURE — C1729 CATH, DRAINAGE: HCPCS | Performed by: NEUROLOGICAL SURGERY

## 2019-01-23 PROCEDURE — 25010000002 PROPOFOL 1000 MG/ML EMULSION: Performed by: NURSE ANESTHETIST, CERTIFIED REGISTERED

## 2019-01-23 PROCEDURE — C1713 ANCHOR/SCREW BN/BN,TIS/BN: HCPCS | Performed by: NEUROLOGICAL SURGERY

## 2019-01-23 PROCEDURE — 0RG20A0 FUSION OF 2 OR MORE CERVICAL VERTEBRAL JOINTS WITH INTERBODY FUSION DEVICE, ANTERIOR APPROACH, ANTERIOR COLUMN, OPEN APPROACH: ICD-10-PCS | Performed by: NEUROLOGICAL SURGERY

## 2019-01-23 PROCEDURE — 22854 INSJ BIOMECHANICAL DEVICE: CPT | Performed by: PHYSICIAN ASSISTANT

## 2019-01-23 PROCEDURE — 22585 ARTHRD ANT NTRBD MIN DSC EA: CPT | Performed by: PHYSICIAN ASSISTANT

## 2019-01-23 PROCEDURE — 25010000002 MORPHINE PER 10 MG: Performed by: NEUROLOGICAL SURGERY

## 2019-01-23 PROCEDURE — L0174 CERV SR 2PC THOR EXT PRE OTS: HCPCS | Performed by: NEUROLOGICAL SURGERY

## 2019-01-23 PROCEDURE — 84132 ASSAY OF SERUM POTASSIUM: CPT | Performed by: ANESTHESIOLOGY

## 2019-01-23 PROCEDURE — 25010000002 FENTANYL CITRATE (PF) 100 MCG/2ML SOLUTION: Performed by: NURSE ANESTHETIST, CERTIFIED REGISTERED

## 2019-01-23 PROCEDURE — 22854 INSJ BIOMECHANICAL DEVICE: CPT | Performed by: NEUROLOGICAL SURGERY

## 2019-01-23 PROCEDURE — 63081 REMOVE VERT BODY DCMPRN CRVL: CPT | Performed by: NEUROLOGICAL SURGERY

## 2019-01-23 PROCEDURE — 22554 ARTHRD ANT NTRBD MIN DSC CRV: CPT | Performed by: PHYSICIAN ASSISTANT

## 2019-01-23 PROCEDURE — 0RB30ZZ EXCISION OF CERVICAL VERTEBRAL DISC, OPEN APPROACH: ICD-10-PCS | Performed by: NEUROLOGICAL SURGERY

## 2019-01-23 PROCEDURE — 25010000002 PHENYLEPHRINE PER 1 ML: Performed by: NURSE ANESTHETIST, CERTIFIED REGISTERED

## 2019-01-23 PROCEDURE — 00QT0ZZ REPAIR SPINAL MENINGES, OPEN APPROACH: ICD-10-PCS | Performed by: NEUROLOGICAL SURGERY

## 2019-01-23 PROCEDURE — 25010000002 PROPOFOL 10 MG/ML EMULSION: Performed by: NURSE ANESTHETIST, CERTIFIED REGISTERED

## 2019-01-23 PROCEDURE — 63081 REMOVE VERT BODY DCMPRN CRVL: CPT | Performed by: PHYSICIAN ASSISTANT

## 2019-01-23 PROCEDURE — 76000 FLUOROSCOPY <1 HR PHYS/QHP: CPT

## 2019-01-23 PROCEDURE — 99232 SBSQ HOSP IP/OBS MODERATE 35: CPT | Performed by: INTERNAL MEDICINE

## 2019-01-23 PROCEDURE — 25010000002 NEOSTIGMINE 10 MG/10ML SOLUTION: Performed by: NURSE ANESTHETIST, CERTIFIED REGISTERED

## 2019-01-23 PROCEDURE — 22585 ARTHRD ANT NTRBD MIN DSC EA: CPT | Performed by: NEUROLOGICAL SURGERY

## 2019-01-23 PROCEDURE — 22846 INSERT SPINE FIXATION DEVICE: CPT | Performed by: NEUROLOGICAL SURGERY

## 2019-01-23 DEVICE — PLT SKYLINE 3LVL 6HL 45MM: Type: IMPLANTABLE DEVICE | Site: SPINE CERVICAL | Status: FUNCTIONAL

## 2019-01-23 DEVICE — CLIP LIGAT VASC HORIZON TI MD BLU 6CT: Type: IMPLANTABLE DEVICE | Site: SPINE CERVICAL | Status: FUNCTIONAL

## 2019-01-23 DEVICE — DURAGEN® PLUS DURAL REGENERATION MATRIX, 3 IN X 3 IN (7.5 CM X 7.5 CM)
Type: IMPLANTABLE DEVICE | Site: SPINE CERVICAL | Status: FUNCTIONAL
Brand: DURAGEN® PLUS

## 2019-01-23 DEVICE — IMPLANTABLE DEVICE: Type: IMPLANTABLE DEVICE | Site: SPINE CERVICAL | Status: FUNCTIONAL

## 2019-01-23 DEVICE — DURASEAL® DURAL SEALANT SYSTEM 5ML 5 PACK
Type: IMPLANTABLE DEVICE | Site: SPINE CERVICAL | Status: FUNCTIONAL
Brand: DURASEAL®

## 2019-01-23 DEVICE — WAX BONE HEMO AESCULAP 2.5GM: Type: IMPLANTABLE DEVICE | Site: SPINE CERVICAL | Status: FUNCTIONAL

## 2019-01-23 DEVICE — BENGAL STACKABLE CAGE SYSTEM STANDARD LORDOTIC 12 X 14 X 38MM, 0 DEGREES/6 DEGREES
Type: IMPLANTABLE DEVICE | Site: SPINE CERVICAL | Status: FUNCTIONAL
Brand: BENGAL

## 2019-01-23 DEVICE — CATH 46420 EDM LUMBAR 80CM/OPEN TIP: Type: IMPLANTABLE DEVICE | Site: SPINE CERVICAL | Status: FUNCTIONAL

## 2019-01-23 DEVICE — SEALANT WND FIBRIN TISSEEL VAPOR/HEAT/PREFIL/SYR 10ML: Type: IMPLANTABLE DEVICE | Site: SPINE CERVICAL | Status: FUNCTIONAL

## 2019-01-23 RX ORDER — ACETAMINOPHEN 325 MG/1
650 TABLET ORAL ONCE
Status: COMPLETED | OUTPATIENT
Start: 2019-01-23 | End: 2019-01-23

## 2019-01-23 RX ORDER — CEFAZOLIN SODIUM 2 G/100ML
2 INJECTION, SOLUTION INTRAVENOUS ONCE
Status: COMPLETED | OUTPATIENT
Start: 2019-01-23 | End: 2019-01-23

## 2019-01-23 RX ORDER — LIDOCAINE HYDROCHLORIDE AND EPINEPHRINE 5; 5 MG/ML; UG/ML
INJECTION, SOLUTION INFILTRATION; PERINEURAL AS NEEDED
Status: DISCONTINUED | OUTPATIENT
Start: 2019-01-23 | End: 2019-01-23 | Stop reason: HOSPADM

## 2019-01-23 RX ORDER — CALCIUM CARBONATE 200(500)MG
2 TABLET,CHEWABLE ORAL 3 TIMES DAILY PRN
Status: DISCONTINUED | OUTPATIENT
Start: 2019-01-23 | End: 2019-01-31 | Stop reason: HOSPADM

## 2019-01-23 RX ORDER — DEXAMETHASONE SODIUM PHOSPHATE 4 MG/ML
INJECTION, SOLUTION INTRA-ARTICULAR; INTRALESIONAL; INTRAMUSCULAR; INTRAVENOUS; SOFT TISSUE AS NEEDED
Status: DISCONTINUED | OUTPATIENT
Start: 2019-01-23 | End: 2019-01-23 | Stop reason: SURG

## 2019-01-23 RX ORDER — FAMOTIDINE 20 MG/1
20 TABLET, FILM COATED ORAL ONCE
Status: COMPLETED | OUTPATIENT
Start: 2019-01-23 | End: 2019-01-23

## 2019-01-23 RX ORDER — HYDROCODONE BITARTRATE AND ACETAMINOPHEN 7.5; 325 MG/1; MG/1
1 TABLET ORAL ONCE
Status: COMPLETED | OUTPATIENT
Start: 2019-01-23 | End: 2019-01-23

## 2019-01-23 RX ORDER — CEFAZOLIN SODIUM 2 G/100ML
2 INJECTION, SOLUTION INTRAVENOUS EVERY 8 HOURS
Status: COMPLETED | OUTPATIENT
Start: 2019-01-23 | End: 2019-01-24

## 2019-01-23 RX ORDER — FENTANYL CITRATE 50 UG/ML
50 INJECTION, SOLUTION INTRAMUSCULAR; INTRAVENOUS
Status: DISCONTINUED | OUTPATIENT
Start: 2019-01-23 | End: 2019-01-23

## 2019-01-23 RX ORDER — HYDROCODONE BITARTRATE AND ACETAMINOPHEN 7.5; 325 MG/1; MG/1
1 TABLET ORAL EVERY 4 HOURS PRN
Status: DISCONTINUED | OUTPATIENT
Start: 2019-01-23 | End: 2019-01-31 | Stop reason: HOSPADM

## 2019-01-23 RX ORDER — IBUPROFEN 800 MG/1
800 TABLET ORAL ONCE
Status: COMPLETED | OUTPATIENT
Start: 2019-01-23 | End: 2019-01-23

## 2019-01-23 RX ORDER — FLUOXETINE HYDROCHLORIDE 20 MG/1
20 CAPSULE ORAL DAILY
Status: DISCONTINUED | OUTPATIENT
Start: 2019-01-23 | End: 2019-01-31 | Stop reason: HOSPADM

## 2019-01-23 RX ORDER — MINERAL OIL
OIL (ML) MISCELLANEOUS AS NEEDED
Status: DISCONTINUED | OUTPATIENT
Start: 2019-01-23 | End: 2019-01-23 | Stop reason: HOSPADM

## 2019-01-23 RX ORDER — MAGNESIUM HYDROXIDE 1200 MG/15ML
LIQUID ORAL AS NEEDED
Status: DISCONTINUED | OUTPATIENT
Start: 2019-01-23 | End: 2019-01-23 | Stop reason: HOSPADM

## 2019-01-23 RX ORDER — MORPHINE SULFATE 4 MG/ML
2 INJECTION, SOLUTION INTRAMUSCULAR; INTRAVENOUS EVERY 4 HOURS PRN
Status: DISCONTINUED | OUTPATIENT
Start: 2019-01-23 | End: 2019-01-28

## 2019-01-23 RX ORDER — ACETAMINOPHEN 325 MG/1
650 TABLET ORAL EVERY 4 HOURS PRN
Status: DISCONTINUED | OUTPATIENT
Start: 2019-01-23 | End: 2019-01-31 | Stop reason: HOSPADM

## 2019-01-23 RX ORDER — CLONIDINE HYDROCHLORIDE 0.1 MG/1
0.1 TABLET ORAL 2 TIMES DAILY
Status: DISCONTINUED | OUTPATIENT
Start: 2019-01-23 | End: 2019-01-24

## 2019-01-23 RX ORDER — HYDROMORPHONE HYDROCHLORIDE 1 MG/ML
0.5 INJECTION, SOLUTION INTRAMUSCULAR; INTRAVENOUS; SUBCUTANEOUS
Status: DISCONTINUED | OUTPATIENT
Start: 2019-01-23 | End: 2019-01-23

## 2019-01-23 RX ORDER — NALOXONE HCL 0.4 MG/ML
0.4 VIAL (ML) INJECTION
Status: DISCONTINUED | OUTPATIENT
Start: 2019-01-23 | End: 2019-01-31 | Stop reason: HOSPADM

## 2019-01-23 RX ORDER — DIPHENHYDRAMINE HCL 25 MG
25 CAPSULE ORAL NIGHTLY PRN
Status: DISCONTINUED | OUTPATIENT
Start: 2019-01-23 | End: 2019-01-31 | Stop reason: HOSPADM

## 2019-01-23 RX ORDER — PROMETHAZINE HYDROCHLORIDE 25 MG/1
25 TABLET ORAL ONCE AS NEEDED
Status: DISCONTINUED | OUTPATIENT
Start: 2019-01-23 | End: 2019-01-23

## 2019-01-23 RX ORDER — SENNA AND DOCUSATE SODIUM 50; 8.6 MG/1; MG/1
1 TABLET, FILM COATED ORAL NIGHTLY PRN
Status: DISCONTINUED | OUTPATIENT
Start: 2019-01-23 | End: 2019-01-26

## 2019-01-23 RX ORDER — MELATONIN
1000 DAILY
COMMUNITY
End: 2021-11-08

## 2019-01-23 RX ORDER — PROMETHAZINE HYDROCHLORIDE 25 MG/ML
12.5 INJECTION, SOLUTION INTRAMUSCULAR; INTRAVENOUS ONCE AS NEEDED
Status: DISCONTINUED | OUTPATIENT
Start: 2019-01-23 | End: 2019-01-23

## 2019-01-23 RX ORDER — PROMETHAZINE HYDROCHLORIDE 25 MG/1
25 SUPPOSITORY RECTAL ONCE AS NEEDED
Status: DISCONTINUED | OUTPATIENT
Start: 2019-01-23 | End: 2019-01-23

## 2019-01-23 RX ORDER — PROPOFOL 10 MG/ML
VIAL (ML) INTRAVENOUS AS NEEDED
Status: DISCONTINUED | OUTPATIENT
Start: 2019-01-23 | End: 2019-01-23 | Stop reason: SURG

## 2019-01-23 RX ORDER — ATRACURIUM BESYLATE 10 MG/ML
INJECTION, SOLUTION INTRAVENOUS AS NEEDED
Status: DISCONTINUED | OUTPATIENT
Start: 2019-01-23 | End: 2019-01-23 | Stop reason: SURG

## 2019-01-23 RX ORDER — FENTANYL CITRATE 50 UG/ML
INJECTION, SOLUTION INTRAMUSCULAR; INTRAVENOUS AS NEEDED
Status: DISCONTINUED | OUTPATIENT
Start: 2019-01-23 | End: 2019-01-23 | Stop reason: SURG

## 2019-01-23 RX ORDER — DOCUSATE SODIUM 100 MG/1
100 CAPSULE, LIQUID FILLED ORAL 2 TIMES DAILY PRN
Status: DISCONTINUED | OUTPATIENT
Start: 2019-01-23 | End: 2019-01-31 | Stop reason: HOSPADM

## 2019-01-23 RX ORDER — FAMOTIDINE 10 MG/ML
20 INJECTION, SOLUTION INTRAVENOUS ONCE
Status: CANCELLED | OUTPATIENT
Start: 2019-01-23 | End: 2019-01-23

## 2019-01-23 RX ORDER — FIBRINOGEN HUMAN AND THROMBIN HUMAN 90; 500 [IU]/ML; [IU]/ML
SOLUTION TOPICAL AS NEEDED
Status: DISCONTINUED | OUTPATIENT
Start: 2019-01-23 | End: 2019-01-23 | Stop reason: HOSPADM

## 2019-01-23 RX ORDER — ONDANSETRON 2 MG/ML
INJECTION INTRAMUSCULAR; INTRAVENOUS AS NEEDED
Status: DISCONTINUED | OUTPATIENT
Start: 2019-01-23 | End: 2019-01-23 | Stop reason: SURG

## 2019-01-23 RX ORDER — PREGABALIN 75 MG/1
150 CAPSULE ORAL 2 TIMES DAILY
Status: DISCONTINUED | OUTPATIENT
Start: 2019-01-23 | End: 2019-01-24

## 2019-01-23 RX ORDER — NEOSTIGMINE METHYLSULFATE 1 MG/ML
INJECTION, SOLUTION INTRAVENOUS AS NEEDED
Status: DISCONTINUED | OUTPATIENT
Start: 2019-01-23 | End: 2019-01-23 | Stop reason: SURG

## 2019-01-23 RX ORDER — LIDOCAINE HYDROCHLORIDE 10 MG/ML
INJECTION, SOLUTION EPIDURAL; INFILTRATION; INTRACAUDAL; PERINEURAL AS NEEDED
Status: DISCONTINUED | OUTPATIENT
Start: 2019-01-23 | End: 2019-01-23 | Stop reason: SURG

## 2019-01-23 RX ORDER — BISACODYL 10 MG
10 SUPPOSITORY, RECTAL RECTAL DAILY PRN
Status: DISCONTINUED | OUTPATIENT
Start: 2019-01-23 | End: 2019-01-31 | Stop reason: HOSPADM

## 2019-01-23 RX ORDER — DIAZEPAM 5 MG/1
5 TABLET ORAL EVERY 6 HOURS PRN
Status: DISCONTINUED | OUTPATIENT
Start: 2019-01-23 | End: 2019-01-31 | Stop reason: HOSPADM

## 2019-01-23 RX ORDER — MORPHINE SULFATE 4 MG/ML
1 INJECTION, SOLUTION INTRAMUSCULAR; INTRAVENOUS EVERY 4 HOURS PRN
Status: DISCONTINUED | OUTPATIENT
Start: 2019-01-23 | End: 2019-01-28

## 2019-01-23 RX ORDER — LABETALOL HYDROCHLORIDE 5 MG/ML
5 INJECTION, SOLUTION INTRAVENOUS
Status: DISCONTINUED | OUTPATIENT
Start: 2019-01-23 | End: 2019-01-23

## 2019-01-23 RX ORDER — SODIUM CHLORIDE, SODIUM LACTATE, POTASSIUM CHLORIDE, CALCIUM CHLORIDE 600; 310; 30; 20 MG/100ML; MG/100ML; MG/100ML; MG/100ML
9 INJECTION, SOLUTION INTRAVENOUS CONTINUOUS
Status: DISCONTINUED | OUTPATIENT
Start: 2019-01-23 | End: 2019-01-23

## 2019-01-23 RX ORDER — LIDOCAINE HYDROCHLORIDE 10 MG/ML
0.5 INJECTION, SOLUTION EPIDURAL; INFILTRATION; INTRACAUDAL; PERINEURAL ONCE AS NEEDED
Status: COMPLETED | OUTPATIENT
Start: 2019-01-23 | End: 2019-01-23

## 2019-01-23 RX ORDER — SODIUM CHLORIDE 0.9 % (FLUSH) 0.9 %
3 SYRINGE (ML) INJECTION EVERY 12 HOURS SCHEDULED
Status: CANCELLED | OUTPATIENT
Start: 2019-01-23

## 2019-01-23 RX ORDER — IBUPROFEN 600 MG/1
600 TABLET ORAL EVERY 8 HOURS SCHEDULED
Status: DISCONTINUED | OUTPATIENT
Start: 2019-01-23 | End: 2019-01-24

## 2019-01-23 RX ORDER — BISACODYL 5 MG/1
5 TABLET, DELAYED RELEASE ORAL DAILY PRN
Status: DISCONTINUED | OUTPATIENT
Start: 2019-01-23 | End: 2019-01-31 | Stop reason: HOSPADM

## 2019-01-23 RX ORDER — TIZANIDINE 4 MG/1
2 TABLET ORAL EVERY 8 HOURS SCHEDULED
Status: DISCONTINUED | OUTPATIENT
Start: 2019-01-23 | End: 2019-01-24

## 2019-01-23 RX ORDER — SODIUM CHLORIDE 0.9 % (FLUSH) 0.9 %
3-10 SYRINGE (ML) INJECTION AS NEEDED
Status: CANCELLED | OUTPATIENT
Start: 2019-01-23

## 2019-01-23 RX ORDER — ROPINIROLE 0.5 MG/1
1 TABLET, FILM COATED ORAL NIGHTLY
Status: DISCONTINUED | OUTPATIENT
Start: 2019-01-23 | End: 2019-01-31 | Stop reason: HOSPADM

## 2019-01-23 RX ORDER — SODIUM CHLORIDE, SODIUM LACTATE, POTASSIUM CHLORIDE, CALCIUM CHLORIDE 600; 310; 30; 20 MG/100ML; MG/100ML; MG/100ML; MG/100ML
75 INJECTION, SOLUTION INTRAVENOUS CONTINUOUS
Status: DISCONTINUED | OUTPATIENT
Start: 2019-01-23 | End: 2019-01-25 | Stop reason: SDUPTHER

## 2019-01-23 RX ORDER — SODIUM CHLORIDE 0.9 % (FLUSH) 0.9 %
3 SYRINGE (ML) INJECTION EVERY 12 HOURS SCHEDULED
Status: DISCONTINUED | OUTPATIENT
Start: 2019-01-23 | End: 2019-01-23

## 2019-01-23 RX ORDER — OXYCODONE AND ACETAMINOPHEN 7.5; 325 MG/1; MG/1
1 TABLET ORAL EVERY 4 HOURS PRN
Status: DISCONTINUED | OUTPATIENT
Start: 2019-01-23 | End: 2019-01-23

## 2019-01-23 RX ORDER — GLYCOPYRROLATE 0.2 MG/ML
INJECTION INTRAMUSCULAR; INTRAVENOUS AS NEEDED
Status: DISCONTINUED | OUTPATIENT
Start: 2019-01-23 | End: 2019-01-23 | Stop reason: SURG

## 2019-01-23 RX ORDER — ONDANSETRON 2 MG/ML
4 INJECTION INTRAMUSCULAR; INTRAVENOUS EVERY 6 HOURS PRN
Status: DISCONTINUED | OUTPATIENT
Start: 2019-01-23 | End: 2019-01-27

## 2019-01-23 RX ORDER — TIZANIDINE 4 MG/1
12 TABLET ORAL NIGHTLY PRN
COMMUNITY

## 2019-01-23 RX ORDER — SODIUM CHLORIDE 0.9 % (FLUSH) 0.9 %
1-10 SYRINGE (ML) INJECTION AS NEEDED
Status: DISCONTINUED | OUTPATIENT
Start: 2019-01-23 | End: 2019-01-31 | Stop reason: HOSPADM

## 2019-01-23 RX ORDER — ASCORBIC ACID 500 MG
500 TABLET ORAL DAILY
COMMUNITY
End: 2021-04-01

## 2019-01-23 RX ADMIN — ACETAMINOPHEN 650 MG: 325 TABLET ORAL at 17:15

## 2019-01-23 RX ADMIN — PHENYLEPHRINE HYDROCHLORIDE 1 MCG/KG/MIN: 10 INJECTION INTRAVENOUS at 09:22

## 2019-01-23 RX ADMIN — DEXAMETHASONE SODIUM PHOSPHATE 8 MG: 4 INJECTION, SOLUTION INTRAMUSCULAR; INTRAVENOUS at 09:10

## 2019-01-23 RX ADMIN — ACETAMINOPHEN 650 MG: 325 TABLET, FILM COATED ORAL at 08:13

## 2019-01-23 RX ADMIN — IBUPROFEN 600 MG: 600 TABLET ORAL at 15:13

## 2019-01-23 RX ADMIN — PROPOFOL 200 MG: 10 INJECTION, EMULSION INTRAVENOUS at 09:10

## 2019-01-23 RX ADMIN — SODIUM CHLORIDE, POTASSIUM CHLORIDE, SODIUM LACTATE AND CALCIUM CHLORIDE 75 ML/HR: 600; 310; 30; 20 INJECTION, SOLUTION INTRAVENOUS at 15:07

## 2019-01-23 RX ADMIN — PROPOFOL 100 MCG/KG/MIN: 10 INJECTION, EMULSION INTRAVENOUS at 09:20

## 2019-01-23 RX ADMIN — GLYCOPYRROLATE 0.1 MG: 0.2 INJECTION, SOLUTION INTRAMUSCULAR; INTRAVENOUS at 12:28

## 2019-01-23 RX ADMIN — ROPINIROLE 1 MG: 0.5 TABLET, FILM COATED ORAL at 21:02

## 2019-01-23 RX ADMIN — FENTANYL CITRATE 50 MCG: 50 INJECTION, SOLUTION INTRAMUSCULAR; INTRAVENOUS at 13:10

## 2019-01-23 RX ADMIN — TIZANIDINE 2 MG: 4 TABLET ORAL at 21:02

## 2019-01-23 RX ADMIN — HYDROCODONE BITARTRATE AND ACETAMINOPHEN 1 TABLET: 7.5; 325 TABLET ORAL at 08:13

## 2019-01-23 RX ADMIN — FENTANYL CITRATE 100 MCG: 50 INJECTION, SOLUTION INTRAMUSCULAR; INTRAVENOUS at 09:10

## 2019-01-23 RX ADMIN — HYDROCODONE BITARTRATE AND ACETAMINOPHEN 1 TABLET: 7.5; 325 TABLET ORAL at 19:58

## 2019-01-23 RX ADMIN — IBUPROFEN 800 MG: 800 TABLET ORAL at 08:13

## 2019-01-23 RX ADMIN — FLUOXETINE HYDROCHLORIDE 20 MG: 20 CAPSULE ORAL at 15:12

## 2019-01-23 RX ADMIN — CEFAZOLIN SODIUM 2 G: 2 INJECTION, SOLUTION INTRAVENOUS at 09:15

## 2019-01-23 RX ADMIN — LIDOCAINE HYDROCHLORIDE 0.5 ML: 10 INJECTION, SOLUTION EPIDURAL; INFILTRATION; INTRACAUDAL; PERINEURAL at 08:13

## 2019-01-23 RX ADMIN — CLONIDINE HYDROCHLORIDE 0.1 MG: 0.1 TABLET ORAL at 21:02

## 2019-01-23 RX ADMIN — ATRACURIUM BESYLATE 50 MG: 10 INJECTION, SOLUTION INTRAVENOUS at 09:10

## 2019-01-23 RX ADMIN — SODIUM CHLORIDE, POTASSIUM CHLORIDE, SODIUM LACTATE AND CALCIUM CHLORIDE: 600; 310; 30; 20 INJECTION, SOLUTION INTRAVENOUS at 11:00

## 2019-01-23 RX ADMIN — TIZANIDINE 2 MG: 4 TABLET ORAL at 15:12

## 2019-01-23 RX ADMIN — CEFAZOLIN SODIUM 2 G: 2 INJECTION, SOLUTION INTRAVENOUS at 16:03

## 2019-01-23 RX ADMIN — MORPHINE SULFATE 1 MG: 4 INJECTION INTRAVENOUS at 15:07

## 2019-01-23 RX ADMIN — PHENYLEPHRINE HYDROCHLORIDE 100 MCG: 10 INJECTION INTRAVENOUS at 09:15

## 2019-01-23 RX ADMIN — NEOSTIGMINE METHYLSULFATE 1 MG: 1 INJECTION, SOLUTION INTRAVENOUS at 12:28

## 2019-01-23 RX ADMIN — CLONIDINE HYDROCHLORIDE 0.1 MG: 0.1 TABLET ORAL at 15:13

## 2019-01-23 RX ADMIN — ONDANSETRON 4 MG: 2 INJECTION INTRAMUSCULAR; INTRAVENOUS at 12:23

## 2019-01-23 RX ADMIN — LIDOCAINE HYDROCHLORIDE 60 MG: 10 INJECTION, SOLUTION EPIDURAL; INFILTRATION; INTRACAUDAL; PERINEURAL at 09:10

## 2019-01-23 RX ADMIN — SODIUM CHLORIDE, POTASSIUM CHLORIDE, SODIUM LACTATE AND CALCIUM CHLORIDE 9 ML/HR: 600; 310; 30; 20 INJECTION, SOLUTION INTRAVENOUS at 08:14

## 2019-01-23 RX ADMIN — HYDROCODONE BITARTRATE AND ACETAMINOPHEN 1 TABLET: 7.5; 325 TABLET ORAL at 15:21

## 2019-01-23 RX ADMIN — MORPHINE SULFATE 1 MG: 4 INJECTION INTRAVENOUS at 21:02

## 2019-01-23 RX ADMIN — FAMOTIDINE 20 MG: 20 TABLET, FILM COATED ORAL at 08:13

## 2019-01-23 RX ADMIN — IBUPROFEN 600 MG: 600 TABLET ORAL at 21:02

## 2019-01-23 RX ADMIN — PREGABALIN 150 MG: 75 CAPSULE ORAL at 21:02

## 2019-01-23 RX ADMIN — FENTANYL CITRATE 50 MCG: 50 INJECTION, SOLUTION INTRAMUSCULAR; INTRAVENOUS at 13:28

## 2019-01-23 NOTE — ANESTHESIA PREPROCEDURE EVALUATION
Anesthesia Evaluation     NPO Solid Status: > 8 hours  NPO Liquid Status: > 8 hours           Airway   Mallampati: II  TM distance: >3 FB  Possible difficult intubation and Small opening  Dental - normal exam     Pulmonary     breath sounds clear to auscultation  (-) asthma, not a smoker  Cardiovascular     ECG reviewed  Rhythm: regular  Rate: normal    (+) hypertension,   (-) angina, murmur, cardiac stents, CABG      Neuro/Psych  (+) seizures (age 4 None since),     (-) TIA, CVA  GI/Hepatic/Renal/Endo    (-) liver disease, no renal disease, diabetes    Musculoskeletal     Abdominal    Substance History      OB/GYN          Other        ROS/Med Hx Other: Walks with crutches  H/O cerebral palsy                  Anesthesia Plan    ASA 3     general     intravenous induction     Plan discussed with CRNA.

## 2019-01-23 NOTE — ANESTHESIA PROCEDURE NOTES
Airway  Urgency: elective    Airway not difficult    General Information and Staff    Patient location during procedure: OR    Indications and Patient Condition  Indications for airway management: airway protection    Preoxygenated: yes  MILS not maintained throughout  Mask difficulty assessment: 1 - vent by mask    Final Airway Details  Final airway type: endotracheal airway      Successful airway: ETT  Cuffed: yes   Successful intubation technique: direct laryngoscopy  Endotracheal tube insertion site: oral  Blade: Shai  Blade size: 3  ETT size (mm): 6.5  Cormack-Lehane Classification: grade I - full view of glottis  Placement verified by: chest auscultation and capnometry   Number of attempts at approach: 1    Additional Comments  Pt positioned with shoulder roll before anesthesia.  PA at bedside during induction and intubation.  Cervical stability maintained throughout.  Easy intubation.

## 2019-01-24 PROCEDURE — 99024 POSTOP FOLLOW-UP VISIT: CPT | Performed by: NEUROLOGICAL SURGERY

## 2019-01-24 PROCEDURE — 97165 OT EVAL LOW COMPLEX 30 MIN: CPT

## 2019-01-24 PROCEDURE — 97162 PT EVAL MOD COMPLEX 30 MIN: CPT

## 2019-01-24 PROCEDURE — 25010000003 CEFAZOLIN IN DEXTROSE 2-4 GM/100ML-% SOLUTION: Performed by: NEUROLOGICAL SURGERY

## 2019-01-24 PROCEDURE — 97530 THERAPEUTIC ACTIVITIES: CPT

## 2019-01-24 PROCEDURE — 25010000003 CEFAZOLIN IN DEXTROSE 2-4 GM/100ML-% SOLUTION: Performed by: INTERNAL MEDICINE

## 2019-01-24 PROCEDURE — 94799 UNLISTED PULMONARY SVC/PX: CPT

## 2019-01-24 PROCEDURE — 99232 SBSQ HOSP IP/OBS MODERATE 35: CPT | Performed by: INTERNAL MEDICINE

## 2019-01-24 PROCEDURE — 97116 GAIT TRAINING THERAPY: CPT

## 2019-01-24 PROCEDURE — 25010000002 ONDANSETRON PER 1 MG: Performed by: NEUROLOGICAL SURGERY

## 2019-01-24 RX ORDER — PREGABALIN 75 MG/1
150 CAPSULE ORAL NIGHTLY
Status: DISCONTINUED | OUTPATIENT
Start: 2019-01-24 | End: 2019-01-31 | Stop reason: HOSPADM

## 2019-01-24 RX ORDER — CEFAZOLIN SODIUM 2 G/100ML
2 INJECTION, SOLUTION INTRAVENOUS ONCE
Status: DISCONTINUED | OUTPATIENT
Start: 2019-01-24 | End: 2019-01-24

## 2019-01-24 RX ORDER — CEFAZOLIN SODIUM 2 G/100ML
2 INJECTION, SOLUTION INTRAVENOUS EVERY 8 HOURS
Status: DISCONTINUED | OUTPATIENT
Start: 2019-01-24 | End: 2019-01-31 | Stop reason: HOSPADM

## 2019-01-24 RX ORDER — TIZANIDINE 4 MG/1
2 TABLET ORAL EVERY 8 HOURS PRN
Status: DISCONTINUED | OUTPATIENT
Start: 2019-01-24 | End: 2019-01-26

## 2019-01-24 RX ADMIN — TIZANIDINE 2 MG: 4 TABLET ORAL at 06:04

## 2019-01-24 RX ADMIN — IBUPROFEN 600 MG: 600 TABLET ORAL at 06:04

## 2019-01-24 RX ADMIN — POLYETHYLENE GLYCOL 3350 17 G: 17 POWDER, FOR SOLUTION ORAL at 08:12

## 2019-01-24 RX ADMIN — CEFAZOLIN SODIUM 2 G: 10 INJECTION, POWDER, FOR SOLUTION INTRAVENOUS at 19:36

## 2019-01-24 RX ADMIN — DIAZEPAM 5 MG: 5 TABLET ORAL at 21:41

## 2019-01-24 RX ADMIN — CLONIDINE HYDROCHLORIDE 0.1 MG: 0.1 TABLET ORAL at 08:12

## 2019-01-24 RX ADMIN — HYDROCODONE BITARTRATE AND ACETAMINOPHEN 1 TABLET: 7.5; 325 TABLET ORAL at 04:07

## 2019-01-24 RX ADMIN — ONDANSETRON 4 MG: 2 INJECTION INTRAMUSCULAR; INTRAVENOUS at 23:39

## 2019-01-24 RX ADMIN — ROPINIROLE 1 MG: 0.5 TABLET, FILM COATED ORAL at 20:14

## 2019-01-24 RX ADMIN — PREGABALIN 150 MG: 75 CAPSULE ORAL at 08:12

## 2019-01-24 RX ADMIN — HYDROCODONE BITARTRATE AND ACETAMINOPHEN 1 TABLET: 7.5; 325 TABLET ORAL at 00:03

## 2019-01-24 RX ADMIN — FLUOXETINE HYDROCHLORIDE 20 MG: 20 CAPSULE ORAL at 08:11

## 2019-01-24 RX ADMIN — CEFAZOLIN SODIUM 2 G: 2 INJECTION, SOLUTION INTRAVENOUS at 00:03

## 2019-01-24 RX ADMIN — HYDROCODONE BITARTRATE AND ACETAMINOPHEN 1 TABLET: 7.5; 325 TABLET ORAL at 11:22

## 2019-01-24 RX ADMIN — CEFAZOLIN SODIUM 2 G: 10 INJECTION, POWDER, FOR SOLUTION INTRAVENOUS at 13:59

## 2019-01-24 RX ADMIN — HYDROCODONE BITARTRATE AND ACETAMINOPHEN 1 TABLET: 7.5; 325 TABLET ORAL at 19:35

## 2019-01-24 RX ADMIN — HYDROCODONE BITARTRATE AND ACETAMINOPHEN 1 TABLET: 7.5; 325 TABLET ORAL at 15:24

## 2019-01-25 PROBLEM — Z98.890 S/P DISKECTOMY: Status: ACTIVE | Noted: 2019-01-25

## 2019-01-25 PROCEDURE — 99024 POSTOP FOLLOW-UP VISIT: CPT | Performed by: NEUROLOGICAL SURGERY

## 2019-01-25 PROCEDURE — 97110 THERAPEUTIC EXERCISES: CPT

## 2019-01-25 PROCEDURE — 99232 SBSQ HOSP IP/OBS MODERATE 35: CPT | Performed by: INTERNAL MEDICINE

## 2019-01-25 PROCEDURE — 25010000002 MORPHINE PER 10 MG: Performed by: NEUROLOGICAL SURGERY

## 2019-01-25 PROCEDURE — 25010000002 ONDANSETRON PER 1 MG: Performed by: NEUROLOGICAL SURGERY

## 2019-01-25 PROCEDURE — 25010000003 CEFAZOLIN IN DEXTROSE 2-4 GM/100ML-% SOLUTION: Performed by: INTERNAL MEDICINE

## 2019-01-25 RX ORDER — SODIUM CHLORIDE 9 MG/ML
50 INJECTION, SOLUTION INTRAVENOUS CONTINUOUS
Status: DISCONTINUED | OUTPATIENT
Start: 2019-01-25 | End: 2019-01-28

## 2019-01-25 RX ORDER — ONDANSETRON 2 MG/ML
4 INJECTION INTRAMUSCULAR; INTRAVENOUS EVERY 6 HOURS PRN
Status: DISCONTINUED | OUTPATIENT
Start: 2019-01-25 | End: 2019-01-31 | Stop reason: HOSPADM

## 2019-01-25 RX ORDER — PROMETHAZINE HYDROCHLORIDE 25 MG/ML
6.25 INJECTION, SOLUTION INTRAMUSCULAR; INTRAVENOUS EVERY 6 HOURS PRN
Status: DISCONTINUED | OUTPATIENT
Start: 2019-01-25 | End: 2019-01-26

## 2019-01-25 RX ORDER — BACLOFEN 10 MG/1
10 TABLET ORAL EVERY 8 HOURS SCHEDULED
Status: DISCONTINUED | OUTPATIENT
Start: 2019-01-25 | End: 2019-01-31 | Stop reason: HOSPADM

## 2019-01-25 RX ADMIN — CALCIUM CARBONATE 2 TABLET: 500 TABLET ORAL at 01:23

## 2019-01-25 RX ADMIN — POLYETHYLENE GLYCOL 3350 17 G: 17 POWDER, FOR SOLUTION ORAL at 08:04

## 2019-01-25 RX ADMIN — FLUOXETINE HYDROCHLORIDE 20 MG: 20 CAPSULE ORAL at 08:03

## 2019-01-25 RX ADMIN — SODIUM CHLORIDE, PRESERVATIVE FREE 10 ML: 5 INJECTION INTRAVENOUS at 20:29

## 2019-01-25 RX ADMIN — MORPHINE SULFATE 2 MG: 4 INJECTION INTRAVENOUS at 10:15

## 2019-01-25 RX ADMIN — MORPHINE SULFATE 2 MG: 4 INJECTION INTRAVENOUS at 01:38

## 2019-01-25 RX ADMIN — ONDANSETRON 4 MG: 2 INJECTION INTRAMUSCULAR; INTRAVENOUS at 12:38

## 2019-01-25 RX ADMIN — MORPHINE SULFATE 2 MG: 4 INJECTION INTRAVENOUS at 05:57

## 2019-01-25 RX ADMIN — ONDANSETRON 4 MG: 2 INJECTION INTRAMUSCULAR; INTRAVENOUS at 05:11

## 2019-01-25 RX ADMIN — HYDROCODONE BITARTRATE AND ACETAMINOPHEN 1 TABLET: 7.5; 325 TABLET ORAL at 20:28

## 2019-01-25 RX ADMIN — DIAZEPAM 5 MG: 5 TABLET ORAL at 20:28

## 2019-01-25 RX ADMIN — MORPHINE SULFATE 2 MG: 4 INJECTION INTRAVENOUS at 22:15

## 2019-01-25 RX ADMIN — DIAZEPAM 5 MG: 5 TABLET ORAL at 12:59

## 2019-01-25 RX ADMIN — MORPHINE SULFATE 2 MG: 4 INJECTION INTRAVENOUS at 15:12

## 2019-01-25 RX ADMIN — CEFAZOLIN SODIUM 2 G: 10 INJECTION, POWDER, FOR SOLUTION INTRAVENOUS at 11:52

## 2019-01-25 RX ADMIN — CEFAZOLIN SODIUM 2 G: 10 INJECTION, POWDER, FOR SOLUTION INTRAVENOUS at 04:23

## 2019-01-25 RX ADMIN — PREGABALIN 150 MG: 75 CAPSULE ORAL at 20:28

## 2019-01-25 RX ADMIN — ROPINIROLE 1 MG: 0.5 TABLET, FILM COATED ORAL at 20:28

## 2019-01-25 RX ADMIN — SODIUM CHLORIDE 75 ML/HR: 9 INJECTION, SOLUTION INTRAVENOUS at 11:51

## 2019-01-25 RX ADMIN — CEFAZOLIN SODIUM 2 G: 10 INJECTION, POWDER, FOR SOLUTION INTRAVENOUS at 20:29

## 2019-01-25 RX ADMIN — MORPHINE SULFATE 2 MG: 4 INJECTION INTRAVENOUS at 18:05

## 2019-01-26 PROCEDURE — 25010000003 CEFAZOLIN IN DEXTROSE 2-4 GM/100ML-% SOLUTION: Performed by: INTERNAL MEDICINE

## 2019-01-26 PROCEDURE — 97110 THERAPEUTIC EXERCISES: CPT

## 2019-01-26 PROCEDURE — 25010000002 MORPHINE PER 10 MG: Performed by: NEUROLOGICAL SURGERY

## 2019-01-26 PROCEDURE — 94799 UNLISTED PULMONARY SVC/PX: CPT

## 2019-01-26 PROCEDURE — 99233 SBSQ HOSP IP/OBS HIGH 50: CPT | Performed by: INTERNAL MEDICINE

## 2019-01-26 PROCEDURE — 25010000002 CEFAZOLIN PER 500 MG: Performed by: INTERNAL MEDICINE

## 2019-01-26 PROCEDURE — 99024 POSTOP FOLLOW-UP VISIT: CPT | Performed by: NEUROLOGICAL SURGERY

## 2019-01-26 PROCEDURE — 25010000002 ONDANSETRON PER 1 MG: Performed by: INTERNAL MEDICINE

## 2019-01-26 PROCEDURE — 25010000003 CEFAZOLIN IN DEXTROSE 2-4 GM/100ML-% SOLUTION: Performed by: NEUROLOGICAL SURGERY

## 2019-01-26 PROCEDURE — 97530 THERAPEUTIC ACTIVITIES: CPT

## 2019-01-26 RX ORDER — SENNA AND DOCUSATE SODIUM 50; 8.6 MG/1; MG/1
1 TABLET, FILM COATED ORAL 2 TIMES DAILY
Status: DISCONTINUED | OUTPATIENT
Start: 2019-01-26 | End: 2019-01-31 | Stop reason: HOSPADM

## 2019-01-26 RX ORDER — LATANOPROST 50 UG/ML
1 SOLUTION/ DROPS OPHTHALMIC NIGHTLY
Status: DISCONTINUED | OUTPATIENT
Start: 2019-01-26 | End: 2019-01-31 | Stop reason: HOSPADM

## 2019-01-26 RX ADMIN — HYDROCODONE BITARTRATE AND ACETAMINOPHEN 1 TABLET: 7.5; 325 TABLET ORAL at 04:42

## 2019-01-26 RX ADMIN — CEFAZOLIN SODIUM 2 G: 10 INJECTION, POWDER, FOR SOLUTION INTRAVENOUS at 20:09

## 2019-01-26 RX ADMIN — MORPHINE SULFATE 2 MG: 4 INJECTION INTRAVENOUS at 06:19

## 2019-01-26 RX ADMIN — FLUOXETINE HYDROCHLORIDE 20 MG: 20 CAPSULE ORAL at 09:02

## 2019-01-26 RX ADMIN — CEFAZOLIN SODIUM 2 G: 10 INJECTION, POWDER, FOR SOLUTION INTRAVENOUS at 03:12

## 2019-01-26 RX ADMIN — SODIUM CHLORIDE 75 ML/HR: 9 INJECTION, SOLUTION INTRAVENOUS at 17:53

## 2019-01-26 RX ADMIN — SENNOSIDES AND DOCUSATE SODIUM 1 TABLET: 8.6; 5 TABLET ORAL at 20:08

## 2019-01-26 RX ADMIN — SODIUM CHLORIDE 75 ML/HR: 9 INJECTION, SOLUTION INTRAVENOUS at 01:00

## 2019-01-26 RX ADMIN — ROPINIROLE 1 MG: 0.5 TABLET, FILM COATED ORAL at 20:09

## 2019-01-26 RX ADMIN — HYDROCODONE BITARTRATE AND ACETAMINOPHEN 1 TABLET: 7.5; 325 TABLET ORAL at 16:48

## 2019-01-26 RX ADMIN — MORPHINE SULFATE 2 MG: 4 INJECTION INTRAVENOUS at 19:31

## 2019-01-26 RX ADMIN — ONDANSETRON 4 MG: 2 INJECTION INTRAMUSCULAR; INTRAVENOUS at 21:36

## 2019-01-26 RX ADMIN — MORPHINE SULFATE 2 MG: 4 INJECTION INTRAVENOUS at 10:09

## 2019-01-26 RX ADMIN — MORPHINE SULFATE 2 MG: 4 INJECTION INTRAVENOUS at 02:00

## 2019-01-26 RX ADMIN — POLYETHYLENE GLYCOL 3350 17 G: 17 POWDER, FOR SOLUTION ORAL at 09:02

## 2019-01-26 RX ADMIN — MORPHINE SULFATE 2 MG: 4 INJECTION INTRAVENOUS at 23:52

## 2019-01-26 RX ADMIN — HYDROCODONE BITARTRATE AND ACETAMINOPHEN 1 TABLET: 7.5; 325 TABLET ORAL at 10:09

## 2019-01-26 RX ADMIN — DIAZEPAM 5 MG: 5 TABLET ORAL at 04:42

## 2019-01-26 RX ADMIN — PREGABALIN 150 MG: 75 CAPSULE ORAL at 20:08

## 2019-01-26 RX ADMIN — CEFAZOLIN SODIUM 2 G: 10 INJECTION, POWDER, FOR SOLUTION INTRAVENOUS at 11:55

## 2019-01-26 RX ADMIN — MORPHINE SULFATE 2 MG: 4 INJECTION INTRAVENOUS at 14:21

## 2019-01-26 RX ADMIN — LATANOPROST 1 DROP: 50 SOLUTION/ DROPS OPHTHALMIC at 21:24

## 2019-01-27 LAB
ANION GAP SERPL CALCULATED.3IONS-SCNC: 4 MMOL/L (ref 3–11)
BASOPHILS # BLD AUTO: 0.02 10*3/MM3 (ref 0–0.2)
BASOPHILS NFR BLD AUTO: 0.3 % (ref 0–1)
BUN BLD-MCNC: 7 MG/DL (ref 9–23)
BUN/CREAT SERPL: 12.3 (ref 7–25)
CALCIUM SPEC-SCNC: 8.6 MG/DL (ref 8.7–10.4)
CHLORIDE SERPL-SCNC: 103 MMOL/L (ref 99–109)
CO2 SERPL-SCNC: 29 MMOL/L (ref 20–31)
CREAT BLD-MCNC: 0.57 MG/DL (ref 0.6–1.3)
DEPRECATED RDW RBC AUTO: 46.3 FL (ref 37–54)
EOSINOPHIL # BLD AUTO: 0.34 10*3/MM3 (ref 0–0.3)
EOSINOPHIL NFR BLD AUTO: 5.8 % (ref 0–3)
ERYTHROCYTE [DISTWIDTH] IN BLOOD BY AUTOMATED COUNT: 14.3 % (ref 11.3–14.5)
GFR SERPL CREATININE-BSD FRML MDRD: 107 ML/MIN/1.73
GLUCOSE BLD-MCNC: 127 MG/DL (ref 70–100)
HCT VFR BLD AUTO: 37.4 % (ref 34.5–44)
HGB BLD-MCNC: 11.9 G/DL (ref 11.5–15.5)
IMM GRANULOCYTES # BLD AUTO: 0.01 10*3/MM3 (ref 0–0.03)
IMM GRANULOCYTES NFR BLD AUTO: 0.2 % (ref 0–0.6)
LYMPHOCYTES # BLD AUTO: 1.46 10*3/MM3 (ref 0.6–4.8)
LYMPHOCYTES NFR BLD AUTO: 24.8 % (ref 24–44)
MCH RBC QN AUTO: 28.3 PG (ref 27–31)
MCHC RBC AUTO-ENTMCNC: 31.8 G/DL (ref 32–36)
MCV RBC AUTO: 88.8 FL (ref 80–99)
MONOCYTES # BLD AUTO: 0.5 10*3/MM3 (ref 0–1)
MONOCYTES NFR BLD AUTO: 8.5 % (ref 0–12)
NEUTROPHILS # BLD AUTO: 3.57 10*3/MM3 (ref 1.5–8.3)
NEUTROPHILS NFR BLD AUTO: 60.6 % (ref 41–71)
PLATELET # BLD AUTO: 238 10*3/MM3 (ref 150–450)
PMV BLD AUTO: 10.9 FL (ref 6–12)
POTASSIUM BLD-SCNC: 3.7 MMOL/L (ref 3.5–5.5)
RBC # BLD AUTO: 4.21 10*6/MM3 (ref 3.89–5.14)
SODIUM BLD-SCNC: 136 MMOL/L (ref 132–146)
WBC NRBC COR # BLD: 5.89 10*3/MM3 (ref 3.5–10.8)

## 2019-01-27 PROCEDURE — 25010000003 CEFAZOLIN IN DEXTROSE 2-4 GM/100ML-% SOLUTION: Performed by: NEUROLOGICAL SURGERY

## 2019-01-27 PROCEDURE — 99231 SBSQ HOSP IP/OBS SF/LOW 25: CPT | Performed by: INTERNAL MEDICINE

## 2019-01-27 PROCEDURE — 99024 POSTOP FOLLOW-UP VISIT: CPT | Performed by: NEUROLOGICAL SURGERY

## 2019-01-27 PROCEDURE — 80048 BASIC METABOLIC PNL TOTAL CA: CPT | Performed by: INTERNAL MEDICINE

## 2019-01-27 PROCEDURE — 25010000002 MORPHINE PER 10 MG: Performed by: NEUROLOGICAL SURGERY

## 2019-01-27 PROCEDURE — 25010000002 ONDANSETRON PER 1 MG: Performed by: INTERNAL MEDICINE

## 2019-01-27 PROCEDURE — 85025 COMPLETE CBC W/AUTO DIFF WBC: CPT | Performed by: INTERNAL MEDICINE

## 2019-01-27 RX ADMIN — SENNOSIDES AND DOCUSATE SODIUM 1 TABLET: 8.6; 5 TABLET ORAL at 08:12

## 2019-01-27 RX ADMIN — HYDROCODONE BITARTRATE AND ACETAMINOPHEN 1 TABLET: 7.5; 325 TABLET ORAL at 22:12

## 2019-01-27 RX ADMIN — BACLOFEN 10 MG: 10 TABLET ORAL at 13:06

## 2019-01-27 RX ADMIN — PREGABALIN 150 MG: 75 CAPSULE ORAL at 20:04

## 2019-01-27 RX ADMIN — SENNOSIDES AND DOCUSATE SODIUM 1 TABLET: 8.6; 5 TABLET ORAL at 20:04

## 2019-01-27 RX ADMIN — MORPHINE SULFATE 2 MG: 4 INJECTION INTRAVENOUS at 05:13

## 2019-01-27 RX ADMIN — DOCUSATE SODIUM 100 MG: 100 CAPSULE, LIQUID FILLED ORAL at 13:52

## 2019-01-27 RX ADMIN — HYDROCODONE BITARTRATE AND ACETAMINOPHEN 1 TABLET: 7.5; 325 TABLET ORAL at 12:04

## 2019-01-27 RX ADMIN — LATANOPROST 1 DROP: 50 SOLUTION/ DROPS OPHTHALMIC at 20:05

## 2019-01-27 RX ADMIN — BISACODYL 5 MG: 5 TABLET, COATED ORAL at 13:52

## 2019-01-27 RX ADMIN — SODIUM CHLORIDE 75 ML/HR: 9 INJECTION, SOLUTION INTRAVENOUS at 06:05

## 2019-01-27 RX ADMIN — BACLOFEN 10 MG: 10 TABLET ORAL at 22:12

## 2019-01-27 RX ADMIN — FLUOXETINE HYDROCHLORIDE 20 MG: 20 CAPSULE ORAL at 08:12

## 2019-01-27 RX ADMIN — HYDROCODONE BITARTRATE AND ACETAMINOPHEN 1 TABLET: 7.5; 325 TABLET ORAL at 08:12

## 2019-01-27 RX ADMIN — ROPINIROLE 1 MG: 0.5 TABLET, FILM COATED ORAL at 20:04

## 2019-01-27 RX ADMIN — ONDANSETRON 4 MG: 2 INJECTION INTRAMUSCULAR; INTRAVENOUS at 13:14

## 2019-01-27 RX ADMIN — HYDROCODONE BITARTRATE AND ACETAMINOPHEN 1 TABLET: 7.5; 325 TABLET ORAL at 18:10

## 2019-01-27 RX ADMIN — CEFAZOLIN SODIUM 2 G: 10 INJECTION, POWDER, FOR SOLUTION INTRAVENOUS at 11:03

## 2019-01-27 RX ADMIN — CEFAZOLIN SODIUM 2 G: 10 INJECTION, POWDER, FOR SOLUTION INTRAVENOUS at 20:05

## 2019-01-27 RX ADMIN — CEFAZOLIN SODIUM 2 G: 10 INJECTION, POWDER, FOR SOLUTION INTRAVENOUS at 04:08

## 2019-01-28 PROCEDURE — 99232 SBSQ HOSP IP/OBS MODERATE 35: CPT | Performed by: INTERNAL MEDICINE

## 2019-01-28 PROCEDURE — 97530 THERAPEUTIC ACTIVITIES: CPT

## 2019-01-28 PROCEDURE — 97535 SELF CARE MNGMENT TRAINING: CPT

## 2019-01-28 PROCEDURE — 25010000003 CEFAZOLIN IN DEXTROSE 2-4 GM/100ML-% SOLUTION: Performed by: NEUROLOGICAL SURGERY

## 2019-01-28 PROCEDURE — 97116 GAIT TRAINING THERAPY: CPT

## 2019-01-28 PROCEDURE — 25010000002 HEPARIN (PORCINE) PER 1000 UNITS: Performed by: PHYSICIAN ASSISTANT

## 2019-01-28 PROCEDURE — 25010000002 ONDANSETRON PER 1 MG: Performed by: INTERNAL MEDICINE

## 2019-01-28 PROCEDURE — 97110 THERAPEUTIC EXERCISES: CPT

## 2019-01-28 RX ORDER — HEPARIN SODIUM 5000 [USP'U]/ML
5000 INJECTION, SOLUTION INTRAVENOUS; SUBCUTANEOUS EVERY 12 HOURS SCHEDULED
Status: DISCONTINUED | OUTPATIENT
Start: 2019-01-28 | End: 2019-01-31 | Stop reason: HOSPADM

## 2019-01-28 RX ADMIN — CEFAZOLIN SODIUM 2 G: 10 INJECTION, POWDER, FOR SOLUTION INTRAVENOUS at 04:04

## 2019-01-28 RX ADMIN — DOCUSATE SODIUM 100 MG: 100 CAPSULE, LIQUID FILLED ORAL at 08:18

## 2019-01-28 RX ADMIN — HYDROCODONE BITARTRATE AND ACETAMINOPHEN 1 TABLET: 7.5; 325 TABLET ORAL at 08:18

## 2019-01-28 RX ADMIN — BACLOFEN 10 MG: 10 TABLET ORAL at 06:13

## 2019-01-28 RX ADMIN — LATANOPROST 1 DROP: 50 SOLUTION/ DROPS OPHTHALMIC at 20:56

## 2019-01-28 RX ADMIN — PREGABALIN 150 MG: 75 CAPSULE ORAL at 20:56

## 2019-01-28 RX ADMIN — HYDROCODONE BITARTRATE AND ACETAMINOPHEN 1 TABLET: 7.5; 325 TABLET ORAL at 03:02

## 2019-01-28 RX ADMIN — HEPARIN SODIUM 5000 UNITS: 5000 INJECTION INTRAVENOUS; SUBCUTANEOUS at 20:56

## 2019-01-28 RX ADMIN — HYDROCODONE BITARTRATE AND ACETAMINOPHEN 1 TABLET: 7.5; 325 TABLET ORAL at 11:52

## 2019-01-28 RX ADMIN — SENNOSIDES AND DOCUSATE SODIUM 1 TABLET: 8.6; 5 TABLET ORAL at 20:56

## 2019-01-28 RX ADMIN — ROPINIROLE 1 MG: 0.5 TABLET, FILM COATED ORAL at 20:56

## 2019-01-28 RX ADMIN — BACLOFEN 10 MG: 10 TABLET ORAL at 20:56

## 2019-01-28 RX ADMIN — CEFAZOLIN SODIUM 2 G: 10 INJECTION, POWDER, FOR SOLUTION INTRAVENOUS at 20:56

## 2019-01-28 RX ADMIN — ONDANSETRON 4 MG: 2 INJECTION INTRAMUSCULAR; INTRAVENOUS at 14:51

## 2019-01-28 RX ADMIN — FLUOXETINE HYDROCHLORIDE 20 MG: 20 CAPSULE ORAL at 08:18

## 2019-01-28 RX ADMIN — CEFAZOLIN SODIUM 2 G: 10 INJECTION, POWDER, FOR SOLUTION INTRAVENOUS at 11:52

## 2019-01-28 RX ADMIN — BACLOFEN 10 MG: 10 TABLET ORAL at 14:51

## 2019-01-28 RX ADMIN — SENNOSIDES AND DOCUSATE SODIUM 1 TABLET: 8.6; 5 TABLET ORAL at 08:18

## 2019-01-28 RX ADMIN — HYDROCODONE BITARTRATE AND ACETAMINOPHEN 1 TABLET: 7.5; 325 TABLET ORAL at 18:19

## 2019-01-29 LAB
ANION GAP SERPL CALCULATED.3IONS-SCNC: 4 MMOL/L (ref 3–11)
BASOPHILS # BLD AUTO: 0.03 10*3/MM3 (ref 0–0.2)
BASOPHILS NFR BLD AUTO: 0.5 % (ref 0–1)
BUN BLD-MCNC: 11 MG/DL (ref 9–23)
BUN/CREAT SERPL: 20.8 (ref 7–25)
CALCIUM SPEC-SCNC: 8.8 MG/DL (ref 8.7–10.4)
CHLORIDE SERPL-SCNC: 104 MMOL/L (ref 99–109)
CO2 SERPL-SCNC: 30 MMOL/L (ref 20–31)
CREAT BLD-MCNC: 0.53 MG/DL (ref 0.6–1.3)
DEPRECATED RDW RBC AUTO: 46.7 FL (ref 37–54)
EOSINOPHIL # BLD AUTO: 0.5 10*3/MM3 (ref 0–0.3)
EOSINOPHIL NFR BLD AUTO: 7.7 % (ref 0–3)
ERYTHROCYTE [DISTWIDTH] IN BLOOD BY AUTOMATED COUNT: 14.3 % (ref 11.3–14.5)
GFR SERPL CREATININE-BSD FRML MDRD: 117 ML/MIN/1.73
GLUCOSE BLD-MCNC: 112 MG/DL (ref 70–100)
HCT VFR BLD AUTO: 36.6 % (ref 34.5–44)
HGB BLD-MCNC: 11.8 G/DL (ref 11.5–15.5)
IMM GRANULOCYTES # BLD AUTO: 0.01 10*3/MM3 (ref 0–0.03)
IMM GRANULOCYTES NFR BLD AUTO: 0.2 % (ref 0–0.6)
LYMPHOCYTES # BLD AUTO: 1.51 10*3/MM3 (ref 0.6–4.8)
LYMPHOCYTES NFR BLD AUTO: 23.3 % (ref 24–44)
MAGNESIUM SERPL-MCNC: 1.7 MG/DL (ref 1.3–2.7)
MCH RBC QN AUTO: 28.5 PG (ref 27–31)
MCHC RBC AUTO-ENTMCNC: 32.2 G/DL (ref 32–36)
MCV RBC AUTO: 88.4 FL (ref 80–99)
MONOCYTES # BLD AUTO: 0.5 10*3/MM3 (ref 0–1)
MONOCYTES NFR BLD AUTO: 7.7 % (ref 0–12)
NEUTROPHILS # BLD AUTO: 3.93 10*3/MM3 (ref 1.5–8.3)
NEUTROPHILS NFR BLD AUTO: 60.8 % (ref 41–71)
PLATELET # BLD AUTO: 292 10*3/MM3 (ref 150–450)
PMV BLD AUTO: 10.6 FL (ref 6–12)
POTASSIUM BLD-SCNC: 3.9 MMOL/L (ref 3.5–5.5)
RBC # BLD AUTO: 4.14 10*6/MM3 (ref 3.89–5.14)
SODIUM BLD-SCNC: 138 MMOL/L (ref 132–146)
WBC NRBC COR # BLD: 6.47 10*3/MM3 (ref 3.5–10.8)

## 2019-01-29 PROCEDURE — 99231 SBSQ HOSP IP/OBS SF/LOW 25: CPT | Performed by: INTERNAL MEDICINE

## 2019-01-29 PROCEDURE — 25010000003 CEFAZOLIN IN DEXTROSE 2-4 GM/100ML-% SOLUTION: Performed by: NEUROLOGICAL SURGERY

## 2019-01-29 PROCEDURE — 83735 ASSAY OF MAGNESIUM: CPT | Performed by: INTERNAL MEDICINE

## 2019-01-29 PROCEDURE — 99024 POSTOP FOLLOW-UP VISIT: CPT | Performed by: NEUROLOGICAL SURGERY

## 2019-01-29 PROCEDURE — 25010000002 HEPARIN (PORCINE) PER 1000 UNITS: Performed by: PHYSICIAN ASSISTANT

## 2019-01-29 PROCEDURE — 97116 GAIT TRAINING THERAPY: CPT

## 2019-01-29 PROCEDURE — 25010000002 ONDANSETRON PER 1 MG: Performed by: INTERNAL MEDICINE

## 2019-01-29 PROCEDURE — 97110 THERAPEUTIC EXERCISES: CPT

## 2019-01-29 PROCEDURE — 80048 BASIC METABOLIC PNL TOTAL CA: CPT | Performed by: INTERNAL MEDICINE

## 2019-01-29 PROCEDURE — 85025 COMPLETE CBC W/AUTO DIFF WBC: CPT | Performed by: INTERNAL MEDICINE

## 2019-01-29 RX ORDER — MAGNESIUM SULFATE HEPTAHYDRATE 40 MG/ML
4 INJECTION, SOLUTION INTRAVENOUS AS NEEDED
Status: DISCONTINUED | OUTPATIENT
Start: 2019-01-29 | End: 2019-01-31 | Stop reason: HOSPADM

## 2019-01-29 RX ADMIN — BACLOFEN 10 MG: 10 TABLET ORAL at 06:14

## 2019-01-29 RX ADMIN — ONDANSETRON 4 MG: 2 INJECTION INTRAMUSCULAR; INTRAVENOUS at 22:13

## 2019-01-29 RX ADMIN — BACLOFEN 10 MG: 10 TABLET ORAL at 14:00

## 2019-01-29 RX ADMIN — FLUOXETINE HYDROCHLORIDE 20 MG: 20 CAPSULE ORAL at 08:47

## 2019-01-29 RX ADMIN — ACETAMINOPHEN 650 MG: 325 TABLET ORAL at 03:21

## 2019-01-29 RX ADMIN — MAGNESIUM SULFATE IN WATER 4 G: 40 INJECTION, SOLUTION INTRAVENOUS at 14:02

## 2019-01-29 RX ADMIN — SENNOSIDES AND DOCUSATE SODIUM 1 TABLET: 8.6; 5 TABLET ORAL at 08:47

## 2019-01-29 RX ADMIN — BACLOFEN 10 MG: 10 TABLET ORAL at 22:06

## 2019-01-29 RX ADMIN — PREGABALIN 150 MG: 75 CAPSULE ORAL at 20:19

## 2019-01-29 RX ADMIN — HYDROCODONE BITARTRATE AND ACETAMINOPHEN 1 TABLET: 7.5; 325 TABLET ORAL at 11:53

## 2019-01-29 RX ADMIN — LATANOPROST 1 DROP: 50 SOLUTION/ DROPS OPHTHALMIC at 20:19

## 2019-01-29 RX ADMIN — CEFAZOLIN SODIUM 2 G: 10 INJECTION, POWDER, FOR SOLUTION INTRAVENOUS at 20:19

## 2019-01-29 RX ADMIN — HEPARIN SODIUM 5000 UNITS: 5000 INJECTION INTRAVENOUS; SUBCUTANEOUS at 08:47

## 2019-01-29 RX ADMIN — CEFAZOLIN SODIUM 2 G: 10 INJECTION, POWDER, FOR SOLUTION INTRAVENOUS at 11:53

## 2019-01-29 RX ADMIN — ROPINIROLE 1 MG: 0.5 TABLET, FILM COATED ORAL at 20:19

## 2019-01-29 RX ADMIN — HEPARIN SODIUM 5000 UNITS: 5000 INJECTION INTRAVENOUS; SUBCUTANEOUS at 20:19

## 2019-01-29 RX ADMIN — HYDROCODONE BITARTRATE AND ACETAMINOPHEN 1 TABLET: 7.5; 325 TABLET ORAL at 22:10

## 2019-01-29 RX ADMIN — CEFAZOLIN SODIUM 2 G: 10 INJECTION, POWDER, FOR SOLUTION INTRAVENOUS at 03:22

## 2019-01-30 LAB — MAGNESIUM SERPL-MCNC: 2 MG/DL (ref 1.3–2.7)

## 2019-01-30 PROCEDURE — 25010000003 CEFAZOLIN IN DEXTROSE 2-4 GM/100ML-% SOLUTION: Performed by: NEUROLOGICAL SURGERY

## 2019-01-30 PROCEDURE — 83735 ASSAY OF MAGNESIUM: CPT | Performed by: NURSE PRACTITIONER

## 2019-01-30 PROCEDURE — 97530 THERAPEUTIC ACTIVITIES: CPT

## 2019-01-30 PROCEDURE — 97110 THERAPEUTIC EXERCISES: CPT

## 2019-01-30 PROCEDURE — 99231 SBSQ HOSP IP/OBS SF/LOW 25: CPT | Performed by: INTERNAL MEDICINE

## 2019-01-30 PROCEDURE — 97116 GAIT TRAINING THERAPY: CPT

## 2019-01-30 PROCEDURE — 25010000002 HEPARIN (PORCINE) PER 1000 UNITS: Performed by: PHYSICIAN ASSISTANT

## 2019-01-30 PROCEDURE — 97535 SELF CARE MNGMENT TRAINING: CPT

## 2019-01-30 RX ADMIN — SENNOSIDES AND DOCUSATE SODIUM 1 TABLET: 8.6; 5 TABLET ORAL at 20:25

## 2019-01-30 RX ADMIN — HYDROCODONE BITARTRATE AND ACETAMINOPHEN 1 TABLET: 7.5; 325 TABLET ORAL at 05:09

## 2019-01-30 RX ADMIN — HYDROCODONE BITARTRATE AND ACETAMINOPHEN 1 TABLET: 7.5; 325 TABLET ORAL at 16:00

## 2019-01-30 RX ADMIN — HYDROCODONE BITARTRATE AND ACETAMINOPHEN 1 TABLET: 7.5; 325 TABLET ORAL at 09:23

## 2019-01-30 RX ADMIN — HEPARIN SODIUM 5000 UNITS: 5000 INJECTION INTRAVENOUS; SUBCUTANEOUS at 08:05

## 2019-01-30 RX ADMIN — BACLOFEN 10 MG: 10 TABLET ORAL at 05:09

## 2019-01-30 RX ADMIN — CEFAZOLIN SODIUM 2 G: 10 INJECTION, POWDER, FOR SOLUTION INTRAVENOUS at 04:03

## 2019-01-30 RX ADMIN — ROPINIROLE 1 MG: 0.5 TABLET, FILM COATED ORAL at 20:19

## 2019-01-30 RX ADMIN — CEFAZOLIN SODIUM 2 G: 10 INJECTION, POWDER, FOR SOLUTION INTRAVENOUS at 20:19

## 2019-01-30 RX ADMIN — BACLOFEN 10 MG: 10 TABLET ORAL at 21:32

## 2019-01-30 RX ADMIN — HEPARIN SODIUM 5000 UNITS: 5000 INJECTION INTRAVENOUS; SUBCUTANEOUS at 20:19

## 2019-01-30 RX ADMIN — HYDROCODONE BITARTRATE AND ACETAMINOPHEN 1 TABLET: 7.5; 325 TABLET ORAL at 21:32

## 2019-01-30 RX ADMIN — BACLOFEN 10 MG: 10 TABLET ORAL at 13:32

## 2019-01-30 RX ADMIN — LATANOPROST 1 DROP: 50 SOLUTION/ DROPS OPHTHALMIC at 20:25

## 2019-01-30 RX ADMIN — CEFAZOLIN SODIUM 2 G: 10 INJECTION, POWDER, FOR SOLUTION INTRAVENOUS at 11:12

## 2019-01-30 RX ADMIN — PREGABALIN 150 MG: 75 CAPSULE ORAL at 20:19

## 2019-01-30 RX ADMIN — FLUOXETINE HYDROCHLORIDE 20 MG: 20 CAPSULE ORAL at 08:05

## 2019-01-31 VITALS
DIASTOLIC BLOOD PRESSURE: 82 MMHG | TEMPERATURE: 97.9 F | RESPIRATION RATE: 16 BRPM | SYSTOLIC BLOOD PRESSURE: 137 MMHG | HEART RATE: 85 BPM | OXYGEN SATURATION: 98 % | HEIGHT: 64 IN | BODY MASS INDEX: 32.95 KG/M2 | WEIGHT: 193 LBS

## 2019-01-31 PROCEDURE — 99024 POSTOP FOLLOW-UP VISIT: CPT | Performed by: PHYSICIAN ASSISTANT

## 2019-01-31 PROCEDURE — 25010000002 HEPARIN (PORCINE) PER 1000 UNITS: Performed by: PHYSICIAN ASSISTANT

## 2019-01-31 PROCEDURE — 25010000003 CEFAZOLIN IN DEXTROSE 2-4 GM/100ML-% SOLUTION: Performed by: NEUROLOGICAL SURGERY

## 2019-01-31 RX ORDER — PSEUDOEPHEDRINE HCL 30 MG
100 TABLET ORAL 2 TIMES DAILY PRN
Qty: 30 EACH | Refills: 0 | Status: SHIPPED | OUTPATIENT
Start: 2019-01-31 | End: 2019-03-14

## 2019-01-31 RX ORDER — METHOCARBAMOL 750 MG/1
750 TABLET, FILM COATED ORAL 3 TIMES DAILY
Qty: 60 TABLET | Refills: 1 | Status: SHIPPED | OUTPATIENT
Start: 2019-01-31 | End: 2019-03-14

## 2019-01-31 RX ADMIN — FLUOXETINE HYDROCHLORIDE 20 MG: 20 CAPSULE ORAL at 08:22

## 2019-01-31 RX ADMIN — HEPARIN SODIUM 5000 UNITS: 5000 INJECTION INTRAVENOUS; SUBCUTANEOUS at 08:22

## 2019-01-31 RX ADMIN — BACLOFEN 10 MG: 10 TABLET ORAL at 06:17

## 2019-01-31 RX ADMIN — CEFAZOLIN SODIUM 2 G: 10 INJECTION, POWDER, FOR SOLUTION INTRAVENOUS at 04:13

## 2019-02-14 ENCOUNTER — OFFICE VISIT (OUTPATIENT)
Dept: NEUROSURGERY | Facility: CLINIC | Age: 64
End: 2019-02-14

## 2019-02-14 VITALS
TEMPERATURE: 97.5 F | BODY MASS INDEX: 33.8 KG/M2 | HEIGHT: 64 IN | SYSTOLIC BLOOD PRESSURE: 126 MMHG | WEIGHT: 198 LBS | DIASTOLIC BLOOD PRESSURE: 76 MMHG

## 2019-02-14 DIAGNOSIS — G95.9 CERVICAL MYELOPATHY (HCC): Primary | ICD-10-CM

## 2019-02-14 PROCEDURE — 99024 POSTOP FOLLOW-UP VISIT: CPT | Performed by: PHYSICIAN ASSISTANT

## 2019-02-14 RX ORDER — TRAZODONE HYDROCHLORIDE 50 MG/1
TABLET ORAL
COMMUNITY
Start: 2019-02-12 | End: 2019-03-14

## 2019-02-14 NOTE — PROGRESS NOTES
Subjective   Patient ID: Nikole Beltre is a 64 y.o. female is here today for follow-up for wound check.    HPI:      Patient is a very nice 64-year-old female has a history of cerebral palsy resulted in to our office with progressive gait disturbance and exquisite left upper extremity pain.  Patient is being surgical candidate by Dr. Jordon Villarreal and underwent a C5 and C6 corpectomy with a C4 to C7 anterior cervical fusion.  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  This was covered with a dural replacement as well as DuraSeal with no overt complication.  After the operation was over before patient was woken up from anesthesia with place a lumbar drain to further buttress this repair.    Patient is back today complains of no headaches and has had no drainage out of her incision.  Incisions clean dry signs of infection bleeding erythema.    Patient notices that she has had resolution of her left upper extremity pain she still has some weakness in her left upper extremity as compared to baseline but is well on the mend.  Patient's lower extremities seem to have more power and she is ambulating with crutches only.    Samantha hanson is in this visit to discuss ongoing care.  We have elected to get a CT of her cervical spine to ensure good decompression of the cord.  We will also add on a bone stimulator in hopes to increase bony fusion.  If the CT shows good decompression and no subsidence of the cage hopefully we can avoid a posterior approach surgery.  This was discussed in length with the patient and .    The following portions of the patient's history were reviewed and updated as appropriate: allergies, current medications, past family history, past medical history, past social history, past surgical history and problem list.    Review of Systems   Constitutional: Positive for activity change. Negative for appetite  change, chills, diaphoresis, fatigue, fever and unexpected weight change.   HENT: Positive for trouble swallowing. Negative for congestion, dental problem, drooling, ear discharge, ear pain, facial swelling, hearing loss, mouth sores, nosebleeds, postnasal drip, rhinorrhea, sinus pressure, sneezing, sore throat, tinnitus and voice change.    Eyes: Negative for photophobia, pain, discharge, redness, itching and visual disturbance.   Respiratory: Negative for apnea, cough, choking, chest tightness, shortness of breath, wheezing and stridor.    Cardiovascular: Negative for chest pain, palpitations and leg swelling.   Gastrointestinal: Negative for abdominal distention, abdominal pain, anal bleeding, blood in stool, constipation, diarrhea, nausea, rectal pain and vomiting.   Endocrine: Negative for cold intolerance, heat intolerance, polydipsia, polyphagia and polyuria.   Genitourinary: Negative for decreased urine volume, difficulty urinating, dysuria, enuresis, flank pain, frequency, genital sores, hematuria and urgency.   Musculoskeletal: Positive for gait problem. Negative for arthralgias, back pain, joint swelling, myalgias, neck pain and neck stiffness.   Skin: Positive for wound. Negative for color change, pallor and rash.   Allergic/Immunologic: Negative for environmental allergies, food allergies and immunocompromised state.   Neurological: Positive for weakness. Negative for dizziness, tremors, seizures, syncope, facial asymmetry, speech difficulty, light-headedness, numbness and headaches.   Hematological: Negative for adenopathy. Does not bruise/bleed easily.   Psychiatric/Behavioral: Negative for agitation, behavioral problems, confusion, decreased concentration, dysphoric mood, hallucinations, self-injury, sleep disturbance and suicidal ideas. The patient is not nervous/anxious and is not hyperactive.    All other systems reviewed and are negative.        Objective    reports that  has never smoked. she  "has never used smokeless tobacco. She reports that she does not drink alcohol or use drugs.   SMOKING STATUS: Nonsmoker    Physical Exam:   Vitals:/76 (BP Location: Right arm, Patient Position: Sitting, Cuff Size: Adult)   Temp 97.5 °F (36.4 °C) (Temporal)   Ht 162.6 cm (64\")   Wt 89.8 kg (198 lb)   BMI 33.99 kg/m²    BMI: Body mass index is 33.99 kg/m².     She has reasonable range of motion at her neck.  Patient's incision is clean dry no signs of infection bleeding erythema     She has good strength upper extremities may be some weakness in her tricep and intrinsic but it's very minimal she has bilateral Raymond's     Her lower extremities very significant proximal weakness lower extremities she she has no clonus but she does have decreased range of motion and weakness in her feet that is improved as compared to previous examinations    Assessment/Plan   Independent Review of Radiographic Studies:    No new films reviewed this visit  Medical Decision Making:    We will see the patient back in 4 weeks following continued rehabilitation.  Patient will get a CT of the cervical spine without contrast to ensure good decompression from OPLL.     We will order a bone growth stimulator for the patient.    It has been a pleasure providing her Neurosurgical care.     Jet Bonilla PA-C  There are no diagnoses linked to this encounter.  No Follow-up on file.             "

## 2019-03-14 ENCOUNTER — HOSPITAL ENCOUNTER (OUTPATIENT)
Dept: CT IMAGING | Facility: HOSPITAL | Age: 64
Discharge: HOME | End: 2019-03-14
Admitting: PHYSICIAN ASSISTANT

## 2019-03-14 ENCOUNTER — OFFICE VISIT (OUTPATIENT)
Dept: NEUROSURGERY | Facility: CLINIC | Age: 64
End: 2019-03-14

## 2019-03-14 VITALS — BODY MASS INDEX: 32.71 KG/M2 | RESPIRATION RATE: 16 BRPM | HEIGHT: 64 IN | WEIGHT: 191.6 LBS

## 2019-03-14 DIAGNOSIS — G95.9 CERVICAL MYELOPATHY (HCC): ICD-10-CM

## 2019-03-14 DIAGNOSIS — Z98.890 S/P DISKECTOMY: Primary | ICD-10-CM

## 2019-03-14 PROCEDURE — 72125 CT NECK SPINE W/O DYE: CPT

## 2019-03-14 PROCEDURE — 99024 POSTOP FOLLOW-UP VISIT: CPT | Performed by: NEUROLOGICAL SURGERY

## 2019-03-14 NOTE — PROGRESS NOTES
NAME: KADE CASH   DOS: 3/14/2019  : 1955  PCP: Felix Dahl MD    Chief Complaint:    Chief Complaint   Patient presents with   • S/p Cervical Kasia C5-6, ACDF C4-7     F/u CT       History of Present Illness:  64 y.o. female   64-year-old female doing well status post 2 level corpectomy and collar decreasing pain and improvement in symptomatology reduction of left arm weakness    PMHX  Allergies:  Allergies   Allergen Reactions   • Percocet [Oxycodone-Acetaminophen] Hives   • Lanolin Rash     Medications    Current Outpatient Medications:   •  celecoxib (CeleBREX) 200 MG capsule, TAKE 1 CAPSULE BY MOUTH EVERY DAY WITH FOOD, Disp: , Rfl: 11  •  cholecalciferol (VITAMIN D3) 1000 units tablet, Take 1,000 Units by mouth Daily., Disp: , Rfl:   •  CloNIDine (CATAPRES) 0.1 MG tablet, Take 0.1 mg by mouth every night at bedtime., Disp: , Rfl: 0  •  colesevelam (WELCHOL) 625 MG tablet, Take 1,875 mg by mouth As Needed (For IBS symptoms)., Disp: , Rfl:   •  FLUoxetine (PROzac) 20 MG capsule, Take 20 mg by mouth Daily., Disp: , Rfl:   •  Gel Base gel, CAPSAICIN 0.001% IMIPRAMINE 3% LIDOCAINE 10% PRILOCAINE 2% MANNITOL 20%. APPLY 1-2 G TO AFFECTED AREA 3-4 TIMES DAILY (Patient taking differently: As Needed. CAPSAICIN 0.001% IMIPRAMINE 3% LIDOCAINE 10% PRILOCAINE 2% MANNITOL 20%. APPLY 1-2 G TO AFFECTED AREA 3-4 TIMES DAILY), Disp: 240 g, Rfl: PRN  •  latanoprost (XALATAN) 0.005 % ophthalmic solution, INSTILL 1 DROP IN EACH EYE AT BEDTIME, Disp: , Rfl: 11  •  pregabalin (LYRICA) 150 MG capsule, Take 150 mg by mouth every night at bedtime., Disp: , Rfl:   •  rOPINIRole (REQUIP) 0.25 MG tablet, , Disp: , Rfl:   •  tiZANidine (ZANAFLEX) 4 MG tablet, Take 12 mg by mouth At Night As Needed for Muscle Spasms., Disp: , Rfl:   •  traMADol (ULTRAM) 50 MG tablet, Take 50 mg by mouth Every 6 (Six) Hours As Needed for Moderate Pain ., Disp: , Rfl:   •  vitamin C (ASCORBIC ACID) 500 MG tablet, Take 500 mg by mouth Daily.,  Disp: , Rfl:   Past Medical History:  Past Medical History:   Diagnosis Date   • Anxiety and depression    • Arthritis    • Bowel incontinence     at night    • Cerebral palsy (CMS/HCC)    • Diverticulosis    • Esophageal spasm    • Glaucoma suspect    • History of seizure     as a child   • Hypertension    • IBS (irritable bowel syndrome)    • PONV (postoperative nausea and vomiting)    • Wears glasses      Past Surgical History:  Past Surgical History:   Procedure Laterality Date   • ACHILLES TENDON LENGTHENING      on both heels   • ANTERIOR CERVICAL DISCECTOMY W/ FUSION N/A 1/23/2019    Procedure: CERVICAL CORPECTOMY C5-C6 ACDF C4-7;  Surgeon: Jordon Villarreal MD;  Location: [facility];  Service: Neurosurgery   • BUNIONECTOMY Bilateral    • CHOLECYSTECTOMY     • COLONOSCOPY     • CYST REMOVAL     • DILATATION AND CURETTAGE      x2   • ENDOSCOPY       Social Hx:  Social History     Tobacco Use   • Smoking status: Never Smoker   • Smokeless tobacco: Never Used   Substance Use Topics   • Alcohol use: No     Frequency: Never   • Drug use: No     Family Hx:  Family History   Family history unknown: Yes     Review of Systems:        Review of Systems   Constitutional: Negative for activity change, appetite change, chills, diaphoresis, fatigue, fever and unexpected weight change.   HENT: Negative for congestion, dental problem, drooling, ear discharge, ear pain, facial swelling, hearing loss, mouth sores, nosebleeds, postnasal drip, rhinorrhea, sinus pressure, sneezing, sore throat, tinnitus, trouble swallowing and voice change.    Eyes: Negative for photophobia, pain, discharge, redness, itching and visual disturbance.   Respiratory: Negative for apnea, cough, choking, chest tightness, shortness of breath, wheezing and stridor.    Cardiovascular: Negative for chest pain, palpitations and leg swelling.   Gastrointestinal: Negative for abdominal distention, abdominal pain, anal bleeding, blood in stool, constipation,  diarrhea, nausea, rectal pain and vomiting.   Endocrine: Negative for cold intolerance, heat intolerance, polydipsia, polyphagia and polyuria.   Genitourinary: Negative for decreased urine volume, difficulty urinating, dysuria, enuresis, flank pain, frequency, genital sores, hematuria and urgency.   Musculoskeletal: Positive for neck stiffness. Negative for arthralgias, back pain, gait problem, joint swelling, myalgias and neck pain.   Skin: Negative for color change, pallor, rash and wound.   Allergic/Immunologic: Negative for environmental allergies, food allergies and immunocompromised state.   Neurological: Positive for weakness. Negative for dizziness, tremors, seizures, syncope, facial asymmetry, speech difficulty, light-headedness, numbness and headaches.   Hematological: Negative for adenopathy. Does not bruise/bleed easily.   Psychiatric/Behavioral: Positive for sleep disturbance. Negative for agitation, behavioral problems, confusion, decreased concentration, dysphoric mood, hallucinations, self-injury and suicidal ideas. The patient is not nervous/anxious and is not hyperactive.    All other systems reviewed and are negative.           Physical Examination:  Vitals:    03/14/19 0944   Resp: 16      General Appearance:   Well developed, well nourished, well groomed, alert, and cooperative.  Neurological examination:  Neurologic Exam  She is awake alert and follows commands her neck is supple    She has 5 out of 5 strength bilaterally slightly weaker on the left side    She is strong in her legs she still has sustained clonus and a trace Raymond's on the right    Review of Imaging/DATA:  CT imaging looks solid  Diagnoses/Plan:    Ms. Beltre is a 64 y.o. female   My hope is that we can avoid a posterior surgery on her overall she seems to be doing very well she also has a lumbar spinal stenosis issues to contend with and that is on the back burner.  We are going to continue her brace for 3 months total I will  see her back with a cervical x-ray in 1 month after that we can wean her from her collar she is to continue her bone stimulator and avoidance of nonsteroidal anti-inflammatories.  I explained the signs and symptoms to look for to necessitate a referral back

## 2019-04-15 ENCOUNTER — OFFICE VISIT (OUTPATIENT)
Dept: NEUROSURGERY | Facility: CLINIC | Age: 64
End: 2019-04-15

## 2019-04-15 ENCOUNTER — HOSPITAL ENCOUNTER (OUTPATIENT)
Dept: GENERAL RADIOLOGY | Facility: HOSPITAL | Age: 64
Discharge: HOME | End: 2019-04-15
Admitting: NEUROLOGICAL SURGERY

## 2019-04-15 VITALS — WEIGHT: 193.2 LBS | RESPIRATION RATE: 16 BRPM | TEMPERATURE: 97.6 F | BODY MASS INDEX: 32.98 KG/M2 | HEIGHT: 64 IN

## 2019-04-15 DIAGNOSIS — G95.9 CERVICAL MYELOPATHY (HCC): ICD-10-CM

## 2019-04-15 DIAGNOSIS — Z98.1 S/P CERVICAL SPINAL FUSION: Primary | ICD-10-CM

## 2019-04-15 DIAGNOSIS — Z98.890 S/P DISKECTOMY: ICD-10-CM

## 2019-04-15 PROCEDURE — 72040 X-RAY EXAM NECK SPINE 2-3 VW: CPT

## 2019-04-15 PROCEDURE — 99024 POSTOP FOLLOW-UP VISIT: CPT | Performed by: NEUROLOGICAL SURGERY

## 2019-04-15 RX ORDER — TRAZODONE HYDROCHLORIDE 50 MG/1
50 TABLET ORAL NIGHTLY PRN
Refills: 1 | COMMUNITY
Start: 2019-04-07 | End: 2021-04-01

## 2019-04-15 NOTE — PROGRESS NOTES
NAME: KADE CASH   DOS: 4/15/2019  : 1955  PCP: Felix Dahl MD    Chief Complaint:    Chief Complaint   Patient presents with   • S/p ACDF C4-7 w/ Corpectomy     3 month f/u XR       History of Present Illness:  64 y.o. female   I saw Alexsander Agustin in follow-up for two-level vertebrectomy anterior approach for OPLL.  She is doing well denies any neck pain PMHX  Allergies:  Allergies   Allergen Reactions   • Percocet [Oxycodone-Acetaminophen] Hives   • Lanolin Rash     Medications    Current Outpatient Medications:   •  celecoxib (CeleBREX) 200 MG capsule, TAKE 1 CAPSULE BY MOUTH EVERY DAY WITH FOOD, Disp: , Rfl: 11  •  cholecalciferol (VITAMIN D3) 1000 units tablet, Take 1,000 Units by mouth Daily., Disp: , Rfl:   •  CloNIDine (CATAPRES) 0.1 MG tablet, Take 0.1 mg by mouth every night at bedtime., Disp: , Rfl: 0  •  colesevelam (WELCHOL) 625 MG tablet, Take 1,875 mg by mouth As Needed (For IBS symptoms)., Disp: , Rfl:   •  FLUoxetine (PROzac) 20 MG capsule, Take 20 mg by mouth Daily., Disp: , Rfl:   •  Gel Base gel, CAPSAICIN 0.001% IMIPRAMINE 3% LIDOCAINE 10% PRILOCAINE 2% MANNITOL 20%. APPLY 1-2 G TO AFFECTED AREA 3-4 TIMES DAILY (Patient taking differently: As Needed. CAPSAICIN 0.001% IMIPRAMINE 3% LIDOCAINE 10% PRILOCAINE 2% MANNITOL 20%. APPLY 1-2 G TO AFFECTED AREA 3-4 TIMES DAILY), Disp: 240 g, Rfl: PRN  •  latanoprost (XALATAN) 0.005 % ophthalmic solution, INSTILL 1 DROP IN EACH EYE AT BEDTIME, Disp: , Rfl: 11  •  pregabalin (LYRICA) 150 MG capsule, Take 150 mg by mouth every night at bedtime., Disp: , Rfl:   •  rOPINIRole (REQUIP) 0.25 MG tablet, , Disp: , Rfl:   •  tiZANidine (ZANAFLEX) 4 MG tablet, Take 12 mg by mouth At Night As Needed for Muscle Spasms., Disp: , Rfl:   •  traMADol (ULTRAM) 50 MG tablet, Take 50 mg by mouth Every 6 (Six) Hours As Needed for Moderate Pain ., Disp: , Rfl:   •  traZODone (DESYREL) 50 MG tablet, Take 50 mg by mouth At Night As Needed., Disp: , Rfl: 1  •   vitamin C (ASCORBIC ACID) 500 MG tablet, Take 500 mg by mouth Daily., Disp: , Rfl:   Past Medical History:  Past Medical History:   Diagnosis Date   • Anxiety and depression    • Arthritis    • Bowel incontinence     at night    • Cerebral palsy (CMS/HCC)    • Diverticulosis    • Esophageal spasm    • Glaucoma suspect    • History of seizure     as a child   • Hypertension    • IBS (irritable bowel syndrome)    • PONV (postoperative nausea and vomiting)    • Wears glasses      Past Surgical History:  Past Surgical History:   Procedure Laterality Date   • ACHILLES TENDON LENGTHENING      on both heels   • ANTERIOR CERVICAL DISCECTOMY W/ FUSION N/A 1/23/2019    Procedure: CERVICAL CORPECTOMY C5-C6 ACDF C4-7;  Surgeon: Jordon Villarreal MD;  [facility]: [facility];  Service: Neurosurgery   • BUNIONECTOMY Bilateral    • CHOLECYSTECTOMY     • COLONOSCOPY     • CYST REMOVAL     • DILATATION AND CURETTAGE      x2   • ENDOSCOPY       Social Hx:  Social History     Tobacco Use   • Smoking status: Never Smoker   • Smokeless tobacco: Never Used   Substance Use Topics   • Alcohol use: No     Frequency: Never   • Drug use: No     Family Hx:  Family History   Family history unknown: Yes     Review of Systems:        Review of Systems   Constitutional: Negative for activity change, appetite change, chills, diaphoresis, fatigue, fever and unexpected weight change.   HENT: Negative for congestion, dental problem, drooling, ear discharge, ear pain, facial swelling, hearing loss, mouth sores, nosebleeds, postnasal drip, rhinorrhea, sinus pressure, sneezing, sore throat, tinnitus, trouble swallowing and voice change.    Eyes: Negative for photophobia, pain, discharge, redness, itching and visual disturbance.   Respiratory: Negative for apnea, cough, choking, chest tightness, shortness of breath, wheezing and stridor.    Cardiovascular: Negative for chest pain, palpitations and leg swelling.   Gastrointestinal: Negative for abdominal  distention, abdominal pain, anal bleeding, blood in stool, constipation, diarrhea, nausea, rectal pain and vomiting.   Endocrine: Negative for cold intolerance, heat intolerance, polydipsia, polyphagia and polyuria.   Genitourinary: Negative for decreased urine volume, difficulty urinating, dysuria, enuresis, flank pain, frequency, genital sores, hematuria and urgency.   Musculoskeletal: Negative for arthralgias, back pain, gait problem, joint swelling, myalgias, neck pain and neck stiffness.   Skin: Negative for color change, pallor, rash and wound.   Allergic/Immunologic: Negative for environmental allergies, food allergies and immunocompromised state.   Neurological: Negative for dizziness, tremors, seizures, syncope, facial asymmetry, speech difficulty, weakness, light-headedness, numbness and headaches.   Hematological: Negative for adenopathy. Does not bruise/bleed easily.   Psychiatric/Behavioral: Negative for agitation, behavioral problems, confusion, decreased concentration, dysphoric mood, hallucinations, self-injury, sleep disturbance and suicidal ideas. The patient is not nervous/anxious and is not hyperactive.    All other systems reviewed and are negative.     I have reviewed this note template and all pertinent parts of the review of systems social, family history, surgical history and medication list      Physical Examination:  Vitals:    04/15/19 0945   Resp: 16   Temp: 97.6 °F (36.4 °C)      General Appearance:   Well developed, well nourished, well groomed, alert, and cooperative.  Neurological examination:  Neurologic Exam  She is awake alert and follows commands her neck is supple    She has good strength in her upper extremities 4+ out of 5 left uppers no evidence of Hoffmans and good  strength    Her gait is very spastic and she walks with assistance she is in a wheelchair today at her baseline    Review of Imaging/DATA:  Her x-rays look good there is no evidence of  subsidence  Diagnoses/Plan:    Ms. Beltre is a 64 y.o. female   Plan is to wean from cervical collar she has no significant neck pain monitor neurologic exam hopefully work to be able to avert the need for a posterior surgery we will see her back in 4 weeks with a repeat cervical x-rays and of instructed her on the signs and symptoms to look for to necessitate a referral back.  She is to be weaned from her collar.

## 2019-05-13 ENCOUNTER — OFFICE VISIT (OUTPATIENT)
Dept: NEUROSURGERY | Facility: CLINIC | Age: 64
End: 2019-05-13

## 2019-05-13 ENCOUNTER — HOSPITAL ENCOUNTER (OUTPATIENT)
Dept: GENERAL RADIOLOGY | Facility: HOSPITAL | Age: 64
Discharge: HOME | End: 2019-05-13
Admitting: NEUROLOGICAL SURGERY

## 2019-05-13 VITALS
BODY MASS INDEX: 32.95 KG/M2 | WEIGHT: 193 LBS | TEMPERATURE: 97.4 F | DIASTOLIC BLOOD PRESSURE: 70 MMHG | HEIGHT: 64 IN | SYSTOLIC BLOOD PRESSURE: 124 MMHG

## 2019-05-13 DIAGNOSIS — M53.9 MULTILEVEL DEGENERATIVE DISC DISEASE: ICD-10-CM

## 2019-05-13 DIAGNOSIS — G95.9 MYELOPATHY (HCC): ICD-10-CM

## 2019-05-13 DIAGNOSIS — G95.9 CERVICAL MYELOPATHY (HCC): ICD-10-CM

## 2019-05-13 DIAGNOSIS — G80.1 SPASTIC DIPLEGIC CEREBRAL PALSY (HCC): ICD-10-CM

## 2019-05-13 DIAGNOSIS — Z98.1 S/P CERVICAL SPINAL FUSION: ICD-10-CM

## 2019-05-13 DIAGNOSIS — Z98.1 S/P CERVICAL SPINAL FUSION: Primary | ICD-10-CM

## 2019-05-13 PROCEDURE — 72040 X-RAY EXAM NECK SPINE 2-3 VW: CPT

## 2019-05-13 PROCEDURE — 99214 OFFICE O/P EST MOD 30 MIN: CPT | Performed by: PHYSICIAN ASSISTANT

## 2019-05-13 NOTE — PROGRESS NOTES
Patient: Nikole Beltre  : 1955    Primary Care Provider: Felix Dahl MD      Chief Complaint: X-ray follow-up    History of Present Illness:       Patient is a very nice 64-year-old female who has a history of cerebral palsy and a two-level vertebrectomy anterior approach for OPLL.  Patient doing very well and denies any neck pain.  She has minimal stiffness with range of motion.  Patient's low back, which was the principal issue for her visit initially.    Patient presents today with x-rays of her surgical construct.  Some early stages of ostosis is noted.  No signs of hardware malfunction.    We recommended patient continue use her bone growth stimulator and [place] collar when driving.    We also reviewed exercises and physical limitations    Review of Systems   Constitutional: Negative for activity change, appetite change, chills, diaphoresis, fatigue, fever and unexpected weight change.   HENT: Negative for congestion, dental problem, drooling, ear discharge, ear pain, facial swelling, hearing loss, mouth sores, nosebleeds, postnasal drip, rhinorrhea, sinus pressure, sneezing, sore throat, tinnitus, trouble swallowing and voice change.    Eyes: Negative for photophobia, pain, discharge, redness, itching and visual disturbance.   Respiratory: Negative for apnea, cough, choking, chest tightness, shortness of breath, wheezing and stridor.    Cardiovascular: Negative for chest pain, palpitations and leg swelling.   Gastrointestinal: Negative for abdominal distention, abdominal pain, anal bleeding, blood in stool, constipation, diarrhea, nausea, rectal pain and vomiting.   Endocrine: Negative for cold intolerance, heat intolerance, polydipsia, polyphagia and polyuria.   Genitourinary: Negative for decreased urine volume, difficulty urinating, dysuria, enuresis, flank pain, frequency, genital sores, hematuria and urgency.   Musculoskeletal: Negative for arthralgias, back pain, gait problem, joint  swelling, myalgias, neck pain and neck stiffness.   Skin: Negative for color change, pallor, rash and wound.   Allergic/Immunologic: Negative for environmental allergies, food allergies and immunocompromised state.   Neurological: Negative for dizziness, tremors, seizures, syncope, facial asymmetry, speech difficulty, weakness, light-headedness, numbness and headaches.   Hematological: Negative for adenopathy. Does not bruise/bleed easily.   Psychiatric/Behavioral: Negative for agitation, behavioral problems, confusion, decreased concentration, dysphoric mood, hallucinations, self-injury, sleep disturbance and suicidal ideas. The patient is not nervous/anxious and is not hyperactive.    All other systems reviewed and are negative.      Past Medical History:     Past Medical History:   Diagnosis Date   • Anxiety and depression    • Arthritis    • Bowel incontinence     at night    • Cerebral palsy (CMS/HCC)    • Diverticulosis    • Esophageal spasm    • Glaucoma suspect    • History of seizure     as a child   • Hypertension    • IBS (irritable bowel syndrome)    • PONV (postoperative nausea and vomiting)    • Wears glasses        Family History:     Family History   Family history unknown: Yes       Social History:    reports that she has never smoked. She has never used smokeless tobacco. She reports that she does not drink alcohol or use drugs.   SMOKING STATUS: Non-smoker    Surgical History:     Past Surgical History:   Procedure Laterality Date   • ACHILLES TENDON LENGTHENING      on both heels   • ANTERIOR CERVICAL DISCECTOMY W/ FUSION N/A 1/23/2019    Procedure: CERVICAL CORPECTOMY C5-C6 ACDF C4-7;  Surgeon: Jordon Villarreal MD;  Location: [facility];  Service: Neurosurgery   • BUNIONECTOMY Bilateral    • CHOLECYSTECTOMY     • COLONOSCOPY     • CYST REMOVAL     • DILATATION AND CURETTAGE      x2   • ENDOSCOPY         Allergies:   Percocet [oxycodone-acetaminophen] and Lanolin    Physical Exam:    Vital  "Signs:/70   Temp 97.4 °F (36.3 °C)   Ht 162.6 cm (64\")   Wt 87.5 kg (193 lb)   BMI 33.13 kg/m²    BMI: Body mass index is 33.13 kg/m².    She is awake alert and follows commands her neck is supple     She has 5 out of 5 strength bilaterally slightly weaker on the left side     She is strong in her legs she still has sustained clonus and a trace Raymond's on the right    Medical Decision Making    Data Review:   X-rays the cervical spine show good surgical construct.    Diagnosis:   Cerebral palsy  Cervical myelopathy  Ossification of posterior longitudinal ligament   Status post two-level vertebrectomy C4-C7 ACDF    Treatment Options:   We will continue to monitor her.  We will see her back in 3 months to check on the low back.  We will also consider further imaging at that time.    It has been a pleasure providing neurosurgical care.    Jet Bonilla PA-C      No diagnosis found.  "

## 2019-08-26 ENCOUNTER — TELEPHONE (OUTPATIENT)
Dept: NEUROSURGERY | Facility: CLINIC | Age: 64
End: 2019-08-26

## 2019-08-26 DIAGNOSIS — M53.9 MULTILEVEL DEGENERATIVE DISC DISEASE: Primary | ICD-10-CM

## 2019-08-26 NOTE — TELEPHONE ENCOUNTER
Pt calling to get XR order faxed to [facility] for her appointment Friday. Does she need a new XR? If so can you please put in an order.    Thanks

## 2019-08-30 ENCOUNTER — OFFICE VISIT (OUTPATIENT)
Dept: NEUROSURGERY | Facility: CLINIC | Age: 64
End: 2019-08-30

## 2019-08-30 DIAGNOSIS — M54.9 MECHANICAL BACK PAIN: ICD-10-CM

## 2019-08-30 DIAGNOSIS — Z98.1 HISTORY OF FUSION OF CERVICAL SPINE: Primary | ICD-10-CM

## 2019-08-30 PROCEDURE — 99213 OFFICE O/P EST LOW 20 MIN: CPT | Performed by: NEUROLOGICAL SURGERY

## 2019-09-03 VITALS — HEIGHT: 64 IN | BODY MASS INDEX: 32.95 KG/M2 | WEIGHT: 193 LBS | RESPIRATION RATE: 15 BRPM

## 2019-09-04 NOTE — PROGRESS NOTES
PATIENT NAME: KADE CASH.  YOB: 1955    DATE OF VISIT: 08/30/2019      SUBJECTIVE:  I saw Kade Cash in neurosurgical consultation.  She is well known to me, a 64-year-old.  She has a history of cerebral palsy and a 2-level vertebrectomy, posterior approach for OPLL.  She has done very well.  Her neck pain is gone.  She has very good strength in her upper extremities with no Stern. She is here with some lumbar spinal x-rays today that show multilevel degenerative disk.  She has a history of L4-5 significant spinal stenosis that I would grade around a 6-7 out of 10.      PHYSICAL EXAMINATION:  Today, on our examination, she is very strong in her arms and has no evidence of Stern today.      She is a wheelchair, has a left-sided foot drop, but overall is very strong.      IMPRESSION AND PLAN:  She wishes to resume her normal activities and denies any other neurologic issues.  I think that is fine.  She is going to call me if any new symptomologies progress.      It has been a pleasure to provide neurosurgical care.            Jordon Villarreal MD  [facility]

## 2021-04-01 ENCOUNTER — OFFICE VISIT (OUTPATIENT)
Dept: NEUROSURGERY | Facility: CLINIC | Age: 66
End: 2021-04-01

## 2021-04-01 VITALS
WEIGHT: 230 LBS | HEART RATE: 66 BPM | HEIGHT: 64 IN | BODY MASS INDEX: 39.27 KG/M2 | OXYGEN SATURATION: 98 % | TEMPERATURE: 97.3 F

## 2021-04-01 DIAGNOSIS — M24.28 HYPERTROPHY OF LIGAMENTUM FLAVUM: ICD-10-CM

## 2021-04-01 DIAGNOSIS — G95.9 MYELOPATHY (HCC): ICD-10-CM

## 2021-04-01 DIAGNOSIS — Z98.890 S/P DISKECTOMY: ICD-10-CM

## 2021-04-01 DIAGNOSIS — M48.062 SPINAL STENOSIS, LUMBAR REGION, WITH NEUROGENIC CLAUDICATION: ICD-10-CM

## 2021-04-01 DIAGNOSIS — G95.9 CERVICAL MYELOPATHY (HCC): Primary | ICD-10-CM

## 2021-04-01 DIAGNOSIS — M48.062 LUMBAR STENOSIS WITH NEUROGENIC CLAUDICATION: ICD-10-CM

## 2021-04-01 DIAGNOSIS — G80.1 SPASTIC DIPLEGIC CEREBRAL PALSY (HCC): ICD-10-CM

## 2021-04-01 PROCEDURE — 99214 OFFICE O/P EST MOD 30 MIN: CPT | Performed by: PHYSICIAN ASSISTANT

## 2021-04-01 RX ORDER — FLUOXETINE HYDROCHLORIDE 40 MG/1
40 CAPSULE ORAL DAILY
COMMUNITY
Start: 2021-03-23 | End: 2021-04-19 | Stop reason: ALTCHOICE

## 2021-04-01 NOTE — PATIENT INSTRUCTIONS
Obesity, Adult  Obesity is the condition of having too much total body fat. Being overweight or obese means that your weight is greater than what is considered healthy for your body size. Obesity is determined by a measurement called BMI. BMI is an estimate of body fat and is calculated from height and weight. For adults, a BMI of 30 or higher is considered obese.  Obesity can lead to other health concerns and major illnesses, including:  · Stroke.  · Coronary artery disease (CAD).  · Type 2 diabetes.  · Some types of cancer, including cancers of the colon, breast, uterus, and gallbladder.  · Osteoarthritis.  · High blood pressure (hypertension).  · High cholesterol.  · Sleep apnea.  · Gallbladder stones.  · Infertility problems.  What are the causes?  Common causes of this condition include:  · Eating daily meals that are high in calories, sugar, and fat.  · Being born with genes that may make you more likely to become obese.  · Having a medical condition that causes obesity, including:  ? Hypothyroidism.  ? Polycystic ovarian syndrome (PCOS).  ? Binge-eating disorder.  ? Cushing syndrome.  · Taking certain medicines, such as steroids, antidepressants, and seizure medicines.  · Not being physically active (sedentary lifestyle).  · Not getting enough sleep.  · Drinking high amounts of sugar-sweetened beverages, such as soft drinks.  What increases the risk?  The following factors may make you more likely to develop this condition:  · Having a family history of obesity.  · Being a woman of  descent.  · Being a man of  descent.  · Living in an area with limited access to:  ? [place], recreation centers, or sidewalks.  ? Healthy food choices, such as grocery stores and farmers' markets.  What are the signs or symptoms?  The main sign of this condition is having too much body fat.  How is this diagnosed?  This condition is diagnosed based on:  · Your BMI. If you are an adult with a BMI of 30 or  higher, you are considered obese.  · Your waist circumference. This measures the distance around your waistline.  · Your skinfold thickness. Your health care provider may gently pinch a fold of your skin and measure it.  You may have other tests to check for underlying conditions.  How is this treated?  Treatment for this condition often includes changing your lifestyle. Treatment may include some or all of the following:  · Dietary changes. This may include developing a healthy meal plan.  · Regular physical activity. This may include activity that causes your heart to beat faster (aerobic exercise) and strength training. Work with your health care provider to design an exercise program that works for you.  · Medicine to help you lose weight if you are unable to lose 1 pound a week after 6 weeks of healthy eating and more physical activity.  · Treating conditions that cause the obesity (underlying conditions).  · Surgery. Surgical options may include gastric banding and gastric bypass. Surgery may be done if:  ? Other treatments have not helped to improve your condition.  ? You have a BMI of 40 or higher.  ? You have life-threatening health problems related to obesity.  Follow these instructions at home:  Eating and drinking    · Follow recommendations from your health care provider about what you eat and drink. Your health care provider may advise you to:  ? Limit fast food, sweets, and processed snack foods.  ? Choose low-fat options, such as low-fat milk instead of whole milk.  ? Eat 5 or more servings of fruits or vegetables every day.  ? Eat at home more often. This gives you more control over what you eat.  ? Choose healthy foods when you eat out.  ? Learn to read food labels. This will help you understand how much food is considered 1 serving.  ? Learn what a healthy serving size is.  ? Keep low-fat snacks available.  ? Limit sugary drinks, such as soda, fruit juice, sweetened iced tea, and flavored  milk.  · Drink enough water to keep your urine pale yellow.  · Do not follow a fad diet. Fad diets can be unhealthy and even dangerous.  Physical activity  · Exercise regularly, as told by your health care provider.  ? Most adults should get up to 150 minutes of moderate-intensity exercise every week.  ? Ask your health care provider what types of exercise are safe for you and how often you should exercise.  · Warm up and stretch before being active.  · Cool down and stretch after being active.  · Rest between periods of activity.  Lifestyle  · Work with your health care provider and a dietitian to set a weight-loss goal that is healthy and reasonable for you.  · Limit your screen time.  · Find ways to reward yourself that do not involve food.  · Do not drink alcohol if:  ? Your health care provider tells you not to drink.  ? You are pregnant, may be pregnant, or are planning to become pregnant.  · If you drink alcohol:  ? Limit how much you use to:  § 0-1 drink a day for women.  § 0-2 drinks a day for men.  ? Be aware of how much alcohol is in your drink. In the [place] one drink equals one 12 oz bottle of beer (355 mL), one 5 oz glass of wine (148 mL), or one 1½ oz glass of hard liquor (44 mL).  General instructions  · Keep a weight-loss journal to keep track of the food you eat and how much exercise you get.  · Take over-the-counter and prescription medicines only as told by your health care provider.  · Take vitamins and supplements only as told by your health care provider.  · Consider joining a support group. Your health care provider may be able to recommend a support group.  · Keep all follow-up visits as told by your health care provider. This is important.  Contact a health care provider if:  · You are unable to meet your weight loss goal after 6 weeks of dietary and lifestyle changes.  Get help right away if you are having:  · Trouble breathing.  · Suicidal thoughts or behaviors.  Summary  · Obesity is the  condition of having too much total body fat.  · Being overweight or obese means that your weight is greater than what is considered healthy for your body size.  · Work with your health care provider and a dietitian to set a weight-loss goal that is healthy and reasonable for you.  · Exercise regularly, as told by your health care provider. Ask your health care provider what types of exercise are safe for you and how often you should exercise.  This information is not intended to replace advice given to you by your health care provider. Make sure you discuss any questions you have with your health care provider.  Document Revised: 08/22/2019 Document Reviewed: 08/22/2019  [employer] Patient Education © 2021 [employer]

## 2021-04-16 ENCOUNTER — HOSPITAL ENCOUNTER (OUTPATIENT)
Dept: CT IMAGING | Facility: HOSPITAL | Age: 66
Discharge: HOME | End: 2021-04-16

## 2021-04-16 ENCOUNTER — HOSPITAL ENCOUNTER (OUTPATIENT)
Dept: MRI IMAGING | Facility: HOSPITAL | Age: 66
Discharge: HOME | End: 2021-04-16

## 2021-04-16 ENCOUNTER — HOSPITAL ENCOUNTER (OUTPATIENT)
Dept: GENERAL RADIOLOGY | Facility: HOSPITAL | Age: 66
Discharge: HOME | End: 2021-04-16

## 2021-04-16 DIAGNOSIS — G95.9 CERVICAL MYELOPATHY (HCC): ICD-10-CM

## 2021-04-16 DIAGNOSIS — G95.9 MYELOPATHY (HCC): ICD-10-CM

## 2021-04-16 DIAGNOSIS — M48.062 SPINAL STENOSIS, LUMBAR REGION, WITH NEUROGENIC CLAUDICATION: ICD-10-CM

## 2021-04-16 PROCEDURE — 72125 CT NECK SPINE W/O DYE: CPT

## 2021-04-16 PROCEDURE — 72148 MRI LUMBAR SPINE W/O DYE: CPT

## 2021-04-16 PROCEDURE — 72114 X-RAY EXAM L-S SPINE BENDING: CPT

## 2021-04-19 ENCOUNTER — TELEPHONE (OUTPATIENT)
Dept: NEUROSURGERY | Facility: CLINIC | Age: 66
End: 2021-04-19

## 2021-04-19 ENCOUNTER — OFFICE VISIT (OUTPATIENT)
Dept: NEUROSURGERY | Facility: CLINIC | Age: 66
End: 2021-04-19

## 2021-04-19 VITALS
BODY MASS INDEX: 40.08 KG/M2 | TEMPERATURE: 96.8 F | SYSTOLIC BLOOD PRESSURE: 140 MMHG | DIASTOLIC BLOOD PRESSURE: 88 MMHG | HEIGHT: 64 IN | WEIGHT: 234.8 LBS

## 2021-04-19 DIAGNOSIS — G95.9 CERVICAL MYELOPATHY (HCC): Primary | ICD-10-CM

## 2021-04-19 DIAGNOSIS — M79.89 BILATERAL SWELLING OF FEET: ICD-10-CM

## 2021-04-19 DIAGNOSIS — M48.062 SPINAL STENOSIS, LUMBAR REGION, WITH NEUROGENIC CLAUDICATION: ICD-10-CM

## 2021-04-19 DIAGNOSIS — G80.1 SPASTIC DIPLEGIC CEREBRAL PALSY (HCC): ICD-10-CM

## 2021-04-19 PROCEDURE — 99214 OFFICE O/P EST MOD 30 MIN: CPT | Performed by: NEUROLOGICAL SURGERY

## 2021-04-19 NOTE — PROGRESS NOTES
NAME: KADE CASH   DOS: 2021  : 1955  PCP: Felix Dahl MD    Chief Complaint:    Chief Complaint   Patient presents with   • Spasticity       History of Present Illness:  66 y.o. female   Is a very pleasant 63-year-old female with a complex history of cerebral palsy.  She's very active and has had quite a few at the inflow type of episodes ambulatory efforts and exercise.  She reports a pattern of increasing utilization to where she can walk even with the assistance of crutches but then will be injured and be off of her crutches for a number of months.  She reported that she saw surgeon back about 3 years ago for evaluation of spinal stenosis after a visit with Dr. Olguin and he recommended surgery but she did not pursue it.     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 she is profoundly weak at baseline but overall reports no changes in neurosurgery exam for better or worse from that time but now    She is here for evaluation for back issues she has a tons of spasticity in her lower extremities it precludes her from walking very well she is here for evaluation with her  who provided some of her care    PMHX  Allergies:  Allergies   Allergen Reactions   • Percocet [Oxycodone-Acetaminophen] Hives   • Lanolin Rash     Medications    Current Outpatient Medications:   •  celecoxib (CeleBREX) 200 MG capsule, TAKE 1 CAPSULE BY MOUTH EVERY DAY WITH FOOD, Disp: , Rfl: 11  •  cholecalciferol (VITAMIN D3) 1000 units tablet,  Take 1,000 Units by mouth Daily., Disp: , Rfl:   •  CloNIDine (CATAPRES) 0.1 MG tablet, Take 0.1 mg by mouth every night at bedtime., Disp: , Rfl: 0  •  colesevelam (WELCHOL) 625 MG tablet, Take 1,875 mg by mouth As Needed (For IBS symptoms)., Disp: , Rfl:   •  latanoprost (XALATAN) 0.005 % ophthalmic solution, INSTILL 1 DROP IN EACH EYE AT BEDTIME, Disp: , Rfl: 11  •  pregabalin (LYRICA) 150 MG capsule, Take 150 mg by mouth every night at bedtime., Disp: , Rfl:   •  ROPINIROLE HCL PO, Take 0.75 mg by mouth Every Night., Disp: , Rfl:   •  tiZANidine (ZANAFLEX) 4 MG tablet, Take 12 mg by mouth At Night As Needed for Muscle Spasms., Disp: , Rfl:   •  traMADol (ULTRAM) 50 MG tablet, Take 50 mg by mouth Every 6 (Six) Hours As Needed for Moderate Pain ., Disp: , Rfl:   Past Medical History:  Past Medical History:   Diagnosis Date   • Anxiety and depression    • Arthritis    • Back problem    • Bowel incontinence     at night    • Cerebral palsy (CMS/HCC)    • Cerebral palsy (CMS/HCC)    • Diverticulosis    • Esophageal spasm    • Glaucoma suspect    • Hearing loss    • History of seizure     as a child   • Hypertension    • IBS (irritable bowel syndrome)    • PONV (postoperative nausea and vomiting)    • Pre-diabetes    • Wears glasses      Past Surgical History:  Past Surgical History:   Procedure Laterality Date   • ACHILLES TENDON LENGTHENING      on both heels   • ANTERIOR CERVICAL DISCECTOMY W/ FUSION N/A 1/23/2019    Procedure: CERVICAL CORPECTOMY C5-C6 ACDF C4-7;  Surgeon: Jordon Villarreal MD;  Location: [facility];  Service: Neurosurgery   • BUNIONECTOMY Bilateral    • CHOLECYSTECTOMY     • COLONOSCOPY     • CYST REMOVAL     • DILATATION AND CURETTAGE      x2   • ENDOSCOPY       Social Hx:  Social History     Tobacco Use   • Smoking status: Never Smoker   • Smokeless tobacco: Never Used   Vaping Use   • Vaping Use: Never used   Substance Use Topics   • Alcohol use: No   • Drug use: No     Family Hx:  Family  History   Problem Relation Age of Onset   • Arthritis Mother    • Asthma Mother    • Cancer Mother    • Hypertension Mother    • Cancer Father    • Parkinsonism Father    • Diabetes Brother    • Hypertension Brother    • Cancer Maternal Aunt    • Diabetes Maternal Uncle    • Heart disease Paternal Grandfather      Review of Systems:        Review of Systems   Constitutional: Positive for activity change. Negative for appetite change, chills, diaphoresis, fatigue, fever and unexpected weight change.   HENT: Negative for congestion, dental problem, drooling, ear discharge, ear pain, facial swelling, hearing loss, mouth sores, nosebleeds, postnasal drip, rhinorrhea, sinus pressure, sinus pain, sneezing, sore throat, tinnitus, trouble swallowing and voice change.    Eyes: Positive for itching. Negative for photophobia, pain, discharge, redness and visual disturbance.   Respiratory: Negative for apnea, cough, choking, chest tightness, shortness of breath, wheezing and stridor.    Cardiovascular: Negative for chest pain, palpitations and leg swelling.   Gastrointestinal: Negative for abdominal distention, abdominal pain, anal bleeding, blood in stool, constipation, diarrhea, nausea, rectal pain and vomiting.   Endocrine: Negative for cold intolerance, heat intolerance, polydipsia, polyphagia and polyuria.   Genitourinary: Negative for decreased urine volume, difficulty urinating, dysuria, enuresis, flank pain, frequency, genital sores, hematuria and urgency.   Musculoskeletal: Positive for back pain and neck stiffness. Negative for arthralgias, gait problem, joint swelling, myalgias and neck pain.   Skin: Negative for color change, pallor, rash and wound.   Allergic/Immunologic: Negative for environmental allergies, food allergies and immunocompromised state.   Neurological: Negative for dizziness, tremors, seizures, syncope, facial asymmetry, speech difficulty, weakness, light-headedness, numbness and headaches.    Hematological: Negative for adenopathy. Does not bruise/bleed easily.   Psychiatric/Behavioral: Positive for sleep disturbance. Negative for agitation, behavioral problems, confusion, decreased concentration, dysphoric mood, hallucinations, self-injury and suicidal ideas. The patient is nervous/anxious. The patient is not hyperactive.         Review of systems is unremarkable for changes    Physical Examination:  Vitals:    04/19/21 1017   BP: 140/88   Temp: 96.8 °F (36 °C)      General Appearance:   Well developed, well nourished, well groomed, alert, and cooperative.  Neurological examination:  Neurologic Exam  Vital signs were reviewed and documented in the chart  Patient appeared in good neurologic function with normal comprehension fluent speech  Mood and affect are normal  Sense of smell deferred  Cranial nerves appear intact        Muscle bulk and tone normal  5 out of 5 strength no motor drift, she may be a bit weaker on her right than her left but she reports actually that the left triceps are weakness she may be 4+ out of 5 at that area mild intrinsic weakness on the right but it is pretty intact    Neck incisions well-healed  Her gait not tested she is very weak lower extremities she has reasonable good iliopsoas quadricep dorsi and plantar flexor pretty weak symmetrically with significant spasticity and sustained clonus  Romberg not tested  She is incredibly spastic lower extremities    Reflexes very brisk  3+ edema noted and extremities skin appears normal    Straight leg raise sign absent  No signs of intrinsic hip dysfunction  Back is without any lesions or abnormality  Feet are warm and well perfused trace pulses        Review of Imaging/DATA:  I reviewed all her imaging CAT scan she she appears well fused from her two-level ventral corpectomy with reasonable alignment    MRI of her lumbar spine shows spinal stenosis L4-5 with severe lateral recess 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 Beltre is a 66 y.o. female   The very complex lady with a history of two-level corpectomy cervical spine OPLL CSF leak noted at the time of surgery with reasonable success of decompressive surgery and preventing progressive loss but presents with increasing spasticity back pain and some leg discomfort she is got L4-5 spinal stenosis moderate intensity with worsening of her lower extremity function and an incredible amount of spasticity that has not been managed adequately    CT shows that she is relatively fused    Plan will be repeat cervical spine MRI just to ensure she is adequately decompressed    From my standpoint L4-5 laminectomy may be in order she also has disease at a lesser extent L2-3 but I suspect at 4 5 is the culprit lesion she may be a candidate for spasticity management surgery such as a implantable baclofen pump which we may consider as well her spasticity may be a component of her pain, I do not like the fact she is had some progressive lower extremity weakness additionally a lot of her pain is in her right buttock hip and leg and it may be amenable to decompressive therapy I did not detect a lot of intrinsic hip dysfunction    Is been a pleasure to provide neurosurgical care    Her  was with her during the visit and provided some portions of the care    I also like a quick deep venous duplex just to ensure she did not have DVTs we will check a lumbar flexion-extension film

## 2021-04-20 ENCOUNTER — HOSPITAL ENCOUNTER (OUTPATIENT)
Dept: CARDIOLOGY | Facility: HOSPITAL | Age: 66
Discharge: HOME | End: 2021-04-20
Admitting: NEUROLOGICAL SURGERY

## 2021-04-20 VITALS — BODY MASS INDEX: 40.12 KG/M2 | WEIGHT: 235 LBS | HEIGHT: 64 IN

## 2021-04-20 DIAGNOSIS — M79.89 BILATERAL SWELLING OF FEET: ICD-10-CM

## 2021-04-20 LAB
BH CV LOWER VASCULAR LEFT COMMON FEMORAL AUGMENT: NORMAL
BH CV LOWER VASCULAR LEFT COMMON FEMORAL COMPETENT: NORMAL
BH CV LOWER VASCULAR LEFT COMMON FEMORAL COMPRESS: NORMAL
BH CV LOWER VASCULAR LEFT COMMON FEMORAL PHASIC: NORMAL
BH CV LOWER VASCULAR LEFT COMMON FEMORAL SPONT: NORMAL
BH CV LOWER VASCULAR LEFT DISTAL FEMORAL COMPRESS: NORMAL
BH CV LOWER VASCULAR LEFT GREATER SAPH AK COMPRESS: NORMAL
BH CV LOWER VASCULAR LEFT MID FEMORAL AUGMENT: NORMAL
BH CV LOWER VASCULAR LEFT MID FEMORAL COMPETENT: NORMAL
BH CV LOWER VASCULAR LEFT MID FEMORAL COMPRESS: NORMAL
BH CV LOWER VASCULAR LEFT MID FEMORAL PHASIC: NORMAL
BH CV LOWER VASCULAR LEFT MID FEMORAL SPONT: NORMAL
BH CV LOWER VASCULAR LEFT POPLITEAL AUGMENT: NORMAL
BH CV LOWER VASCULAR LEFT POPLITEAL COMPETENT: NORMAL
BH CV LOWER VASCULAR LEFT POPLITEAL COMPRESS: NORMAL
BH CV LOWER VASCULAR LEFT POPLITEAL PHASIC: NORMAL
BH CV LOWER VASCULAR LEFT POPLITEAL SPONT: NORMAL
BH CV LOWER VASCULAR LEFT POSTERIOR TIBIAL COMPRESS: NORMAL
BH CV LOWER VASCULAR LEFT PROFUNDA FEMORAL COMPRESS: NORMAL
BH CV LOWER VASCULAR LEFT PROXIMAL FEMORAL COMPRESS: NORMAL
BH CV LOWER VASCULAR LEFT SAPHENOFEMORAL JUNCTION COMPRESS: NORMAL
BH CV LOWER VASCULAR RIGHT COMMON FEMORAL AUGMENT: NORMAL
BH CV LOWER VASCULAR RIGHT COMMON FEMORAL COMPETENT: NORMAL
BH CV LOWER VASCULAR RIGHT COMMON FEMORAL COMPRESS: NORMAL
BH CV LOWER VASCULAR RIGHT COMMON FEMORAL PHASIC: NORMAL
BH CV LOWER VASCULAR RIGHT COMMON FEMORAL SPONT: NORMAL
BH CV LOWER VASCULAR RIGHT GREATER SAPH AK COMPRESS: NORMAL
BH CV LOWER VASCULAR RIGHT MID FEMORAL AUGMENT: NORMAL
BH CV LOWER VASCULAR RIGHT MID FEMORAL COMPETENT: NORMAL
BH CV LOWER VASCULAR RIGHT MID FEMORAL COMPRESS: NORMAL
BH CV LOWER VASCULAR RIGHT MID FEMORAL PHASIC: NORMAL
BH CV LOWER VASCULAR RIGHT MID FEMORAL SPONT: NORMAL
BH CV LOWER VASCULAR RIGHT POPLITEAL AUGMENT: NORMAL
BH CV LOWER VASCULAR RIGHT POPLITEAL COMPETENT: NORMAL
BH CV LOWER VASCULAR RIGHT POPLITEAL COMPRESS: NORMAL
BH CV LOWER VASCULAR RIGHT POPLITEAL PHASIC: NORMAL
BH CV LOWER VASCULAR RIGHT POPLITEAL SPONT: NORMAL
BH CV LOWER VASCULAR RIGHT POSTERIOR TIBIAL COMPRESS: NORMAL
BH CV LOWER VASCULAR RIGHT PROFUNDA FEMORAL COMPRESS: NORMAL
BH CV LOWER VASCULAR RIGHT PROXIMAL FEMORAL COMPRESS: NORMAL
BH CV LOWER VASCULAR RIGHT SAPHENOFEMORAL JUNCTION COMPRESS: NORMAL

## 2021-04-20 PROCEDURE — 93970 EXTREMITY STUDY: CPT | Performed by: INTERNAL MEDICINE

## 2021-04-20 PROCEDURE — 93970 EXTREMITY STUDY: CPT

## 2021-05-07 ENCOUNTER — HOSPITAL ENCOUNTER (OUTPATIENT)
Dept: MRI IMAGING | Facility: HOSPITAL | Age: 66
Discharge: HOME | End: 2021-05-07

## 2021-05-07 DIAGNOSIS — G95.9 CERVICAL MYELOPATHY (HCC): ICD-10-CM

## 2021-05-07 DIAGNOSIS — M48.062 SPINAL STENOSIS, LUMBAR REGION, WITH NEUROGENIC CLAUDICATION: ICD-10-CM

## 2021-05-07 DIAGNOSIS — G80.1 SPASTIC DIPLEGIC CEREBRAL PALSY (HCC): ICD-10-CM

## 2021-05-07 DIAGNOSIS — M79.89 BILATERAL SWELLING OF FEET: ICD-10-CM

## 2021-05-07 PROCEDURE — 72141 MRI NECK SPINE W/O DYE: CPT

## 2021-05-07 PROCEDURE — 72146 MRI CHEST SPINE W/O DYE: CPT

## 2021-05-10 ENCOUNTER — OFFICE VISIT (OUTPATIENT)
Dept: NEUROSURGERY | Facility: CLINIC | Age: 66
End: 2021-05-10

## 2021-05-10 VITALS
HEIGHT: 64 IN | DIASTOLIC BLOOD PRESSURE: 98 MMHG | TEMPERATURE: 96.9 F | SYSTOLIC BLOOD PRESSURE: 140 MMHG | BODY MASS INDEX: 39.75 KG/M2 | WEIGHT: 232.8 LBS

## 2021-05-10 DIAGNOSIS — M48.062 SPINAL STENOSIS, LUMBAR REGION, WITH NEUROGENIC CLAUDICATION: Primary | ICD-10-CM

## 2021-05-10 PROCEDURE — 99214 OFFICE O/P EST MOD 30 MIN: CPT | Performed by: NEUROLOGICAL SURGERY

## 2021-05-10 RX ORDER — DULOXETIN HYDROCHLORIDE 60 MG/1
60 CAPSULE, DELAYED RELEASE ORAL DAILY
COMMUNITY
Start: 2021-04-26

## 2021-05-10 NOTE — PROGRESS NOTES
NAME: KADE CASH   DOS: 5/10/2021  : 1955  PCP: Felix Dahl MD    Chief Complaint:    Chief Complaint   Patient presents with   • Low back pain, bilateral leg weakness       History of Present Illness:  66 y.o. female   I saw this 66-year-old female in neurosurgical consultation.  She is a lady well-known to me that underwent a two-level cervical corpectomy for OPLL he did relatively well with that she has a history of pre-existing cervical myelopathy lumbar spinal stenosis and is very motivated and had underlying paraparesis and spasticity for a number of years secondary the cerebral palsy as a child.  She presented with stability of her symptoms with failure to improve and worsened back pain since her surgery about a year ago she is undergone repeat work-up imaging denies any bowel bladder issues and is currently on Lyrica had multiple medications to manage her symptomatology    PMHX  Allergies:  Allergies   Allergen Reactions   • Percocet [Oxycodone-Acetaminophen] Hives   • Lanolin Rash     Medications    Current Outpatient Medications:   •  celecoxib (CeleBREX) 200 MG capsule, TAKE 1 CAPSULE BY MOUTH EVERY DAY WITH FOOD, Disp: , Rfl: 11  •  cholecalciferol (VITAMIN D3) 1000 units tablet, Take 1,000 Units by mouth Daily., Disp: , Rfl:   •  CloNIDine (CATAPRES) 0.1 MG tablet, Take 0.1 mg by mouth every night at bedtime., Disp: , Rfl: 0  •  colesevelam (WELCHOL) 625 MG tablet, Take 1,875 mg by mouth As Needed (For IBS symptoms)., Disp: , Rfl:   •  DULoxetine (CYMBALTA) 60 MG capsule, , Disp: , Rfl:   •  latanoprost (XALATAN) 0.005 % ophthalmic solution, INSTILL 1 DROP IN EACH EYE AT BEDTIME, Disp: , Rfl: 11  •  pregabalin (LYRICA) 150 MG capsule, Take 150 mg by mouth every night at bedtime., Disp: , Rfl:   •  ROPINIROLE HCL PO, Take 0.75 mg by mouth Every Night., Disp: , Rfl:   •  tiZANidine (ZANAFLEX) 4 MG tablet, Take 12 mg by mouth At Night As Needed for Muscle Spasms., Disp: , Rfl:   •   traMADol (ULTRAM) 50 MG tablet, Take 50 mg by mouth Every 6 (Six) Hours As Needed for Moderate Pain ., Disp: , Rfl:   Past Medical History:  Past Medical History:   Diagnosis Date   • Anxiety and depression    • Arthritis    • Back problem    • Bowel incontinence     at night    • Cerebral palsy (CMS/HCC)    • Cerebral palsy (CMS/HCC)    • Diverticulosis    • Esophageal spasm    • Glaucoma suspect    • Hearing loss    • History of seizure     as a child   • Hypertension    • IBS (irritable bowel syndrome)    • PONV (postoperative nausea and vomiting)    • Pre-diabetes    • Wears glasses      Past Surgical History:  Past Surgical History:   Procedure Laterality Date   • ACHILLES TENDON LENGTHENING      on both heels   • ANTERIOR CERVICAL DISCECTOMY W/ FUSION N/A 1/23/2019    Procedure: CERVICAL CORPECTOMY C5-C6 ACDF C4-7;  Surgeon: Jordon Villarreal MD;  Location: [facility];  Service: Neurosurgery   • BUNIONECTOMY Bilateral    • CHOLECYSTECTOMY     • COLONOSCOPY     • CYST REMOVAL     • DILATATION AND CURETTAGE      x2   • ENDOSCOPY       Social Hx:  Social History     Tobacco Use   • Smoking status: Never Smoker   • Smokeless tobacco: Never Used   Vaping Use   • Vaping Use: Never used   Substance Use Topics   • Alcohol use: No   • Drug use: No     Family Hx:  Family History   Problem Relation Age of Onset   • Arthritis Mother    • Asthma Mother    • Cancer Mother    • Hypertension Mother    • Cancer Father    • Parkinsonism Father    • Diabetes Brother    • Hypertension Brother    • Cancer Maternal Aunt    • Diabetes Maternal Uncle    • Heart disease Paternal Grandfather      Review of Systems:        Review of Systems   Constitutional: Negative for activity change, appetite change, chills, diaphoresis, fatigue, fever and unexpected weight change.   HENT: Negative for congestion, dental problem, drooling, ear discharge, ear pain, facial swelling, hearing loss, mouth sores, nosebleeds, postnasal drip, rhinorrhea,  sinus pressure, sinus pain, sneezing, sore throat, tinnitus, trouble swallowing and voice change.    Eyes: Negative for photophobia, pain, discharge, redness, itching and visual disturbance.   Respiratory: Negative for apnea, cough, choking, chest tightness, shortness of breath, wheezing and stridor.    Cardiovascular: Negative for chest pain, palpitations and leg swelling.   Gastrointestinal: Negative for abdominal distention, abdominal pain, anal bleeding, blood in stool, constipation, diarrhea, nausea, rectal pain and vomiting.   Endocrine: Negative for cold intolerance, heat intolerance, polydipsia, polyphagia and polyuria.   Genitourinary: Negative for decreased urine volume, difficulty urinating, dyspareunia, dysuria, enuresis, flank pain, frequency, genital sores, hematuria, menstrual problem, pelvic pain, urgency, vaginal bleeding, vaginal discharge and vaginal pain.   Musculoskeletal: Positive for back pain, joint swelling and neck pain. Negative for arthralgias, gait problem, myalgias and neck stiffness.   Skin: Negative for color change, pallor, rash and wound.   Allergic/Immunologic: Negative for environmental allergies, food allergies and immunocompromised state.   Neurological: Positive for weakness. Negative for dizziness, tremors, seizures, syncope, facial asymmetry, speech difficulty, light-headedness, numbness and headaches.   Hematological: Negative for adenopathy. Does not bruise/bleed easily.   Psychiatric/Behavioral: Negative for agitation, behavioral problems, confusion, decreased concentration, dysphoric mood, hallucinations, self-injury, sleep disturbance and suicidal ideas. The patient is not nervous/anxious and is not hyperactive.         I have reviewed this note template and all pertinent parts of the review of systems social, family history, surgical history and medication list    Physical Examination:  Vitals:    05/10/21 1427   BP: 140/98   Temp: 96.9 °F (36.1 °C)      General  Appearance:   Well developed, well nourished, well groomed, alert, and cooperative.  Neurological examination:  Neurologic Exam  She is wide awake alert her neck is well-healed her voice is strong    She is got good strength in her upper extremities with good dexterity she has no Stern's clonus long tract findings in her upper extremities other than a trace Raymond's    Back is without lesions    No signs intrinsic hip dysfunction    Her legs are pretty swollen with pitting edema    She has marked spasticity lower extremities    She is in a wheelchair    She has weakness of her iliopsoas bilaterally 2 out of 5, 2-5 dorsiflexor strength    Spasticity with sustained clonus bilaterally    Review of Imaging/DATA:  I reviewed an MRI of her lumbar spine she is got progression of spinal stenosis L4-5 and L2-3 I reviewed the films personally  and reviewed all of her old and new films compared them  Diagnoses/Plan:    Ms. Beltre is a 66 y.o. female   I talked her about her symptoms obviously is a complex mobility issue she has cervical spondylitic myelopathy history of cerebral palsy and advancing age she has been very motivated throughout the years and has been able to essentially work through all of these mobility issues and right now we talked that a lot of this is multifactorial I do think that the predominant symptom coming through for her however is L4-5 spinal stenosis is worse on her scans she also has L2-3 disease as well I think is not unreasonable to proceed with an L5 laminectomy and decompression at the L4 area she would also need an L3 laminectomy as well to decompress all the levels I talked her about the risk benefits expected outcome of the surgery more importantly about the failure to not improve.    I talked her about some strategies wearing compression hose to see if this would assist with getting some of her edema in her lower extremities off    Her duplex is reviewed and they did not show the presence  of any DVTs    Julia try a lumbar epidural steroid block 1 time to see if that will assist with pain control may be we can avoid surgery but she is to call us if she wishes to proceed with laminectomy L5 partial L4 as well as L3

## 2021-06-14 NOTE — PROGRESS NOTES
"Chief Complaint: \"Pain in my lower back.\"      History of Present Illness:   Patient: Ms. Nikole Beltre, 66 y.o. female originally referred by Dr. Jordon Villarreal in consultation for intractable chronic lower back and hip pain. Patient reports an almost 3-year history of lower back pain, which began after lifting weights.  She was last seen in follow-up consultation with Mendoza Corley PA-C on November 29, 2018 after undergoing diagnostic and therapeutic bilateral L4-L5 transforaminal epidural steroid injection, performed on November 7, 2018.  At the time of her follow-up visit she reported essentially no significant relief of her symptoms.  She has been following with Dr. Jordon Villarreal with neurosurgery since.  She had a follow-up with Dr. Villarreal in December 2018, for her lower back but was found to have some evidence of cervical myelopathic symptoms.  Subsequently, on January 23, 2019, she underwent C5 and C6 corpectomy with anterior cervical fusion C4-C7.  She has done well with resolution of her myelopathy and noted some improvement in her lower back.  Unfortunately, over the last several months she has been experiencing severe lower back pain, with significant intolerance to standing and walking.  Her current pain radiates across her low back and immediately upon standing she will begin to experience radiation into her buttocks and hips.  She has a history of cerebral palsy, and has weakness and spasticity at baseline.  She recently underwent neurosurgical follow-up consultation Dr. Jordon Villarreal on May 10, 2021, was found to have significant lumbar disease at L2-L3 and L4-L5 and discussion of multilevel lumbar laminectomies with decompression was considered.  Patient is here today to be reevaluated for her lower back and consideration of another epidural.  Pain description: constant pain with intermittent exacerbation, described as burning, soreness, sharp and stabbing sensation.   Radiation of pain: The " lower back pain radiates into the bilateral gluteal region and into the hips (RT>LT), she no longer experiences radiation into the lateral aspect of the right thigh.  Pain intensity today: 2/10   Average pain intensity last week: 2/10  Pain intensity ranges from: 2/10 to 3/10  Aggravating factors: Pain increases with twisting, bending, standing longer than 5 minutes and ambulating more than 1-2 minutes.  Alleviating factors: Pain decreases with lying, sitting.     Associated symptoms:   Patient denies numbness or weakness in the lower extremities, chronic weakness and spasticity from her CP LT>RT.   Patient reports any new bladder or bowel problems.   Patient reports difficulties with her balance but denies recent falls within the last three months.     Review of previous therapies and additional medical records:  Nikole Beltre has already failed the following measures, including:   Conservative measures: oral analgesics, opioids, physical therapy, ice and heat   Interventional measures: 11/07/2018: DxTx bilateral L4-L5 transforaminal epidural steroid injection  Surgical measures: No history of lumbar spine surgery.  01/23/2019: C5 and C6 corpectomy with anterior cervical fusion C4-C7 with Dr. Villarreal.  Nikole Beltre underwent neurosurgical consultation with Dr. Villarreal on 05/10/2021, and was found to be a potential surgical candidate.  Nikole Beltre presents with significant comorbidities including anxiety, engaged in treatment, obesity, hypertension, cerebral palsy, engaged in treatment.  In terms of current analgesics, Nikole Beltre takes: Lyrica, tramadol, tizanidine, Celebrex  I have reviewed Ham Report #[id number] consistent to medication reconciliation.     Global Pain Scale 10-30  2018 11-29 2018 06-17 2021             Pain  9  6  6             Feelings  13  13  8             Clinical outcomes  15  18  11             Activities  20  8  23             GPS Total:  57  45  48                Review of New Diagnostic Studies:   MRI  of the cervical spine without contrast 5/7/2021: I have reviewed the images.  Postsurgical changes at C4-C7 from prior anterior fusion and corpectomy.  Multilevel spondylitic changes.  Adjacent level disease seen at C3-C4.  C2-C3: Mild disc osteophyte complex with mild anterior thecal sac effacement and mild and uncovertebral spurring and facet hypertrophy greater on the left, producing mild left neuroforaminal stenosis.  C3-C4: Moderate disc osteophyte complex with mild uncovertebral spurring and facet hypertrophy producing mild to moderate anterior thecal sac effacement.  Left paracentral osteophyte predominance minimally contacting and indenting the left Hemacord along with moderate right and mild to moderate left neuroforaminal stenosis.  C4-C5: Fused level with residual right disc osteophyte complex with mild to moderate spinal canal stenosis and mild left neuroforaminal stenosis.  C5-C6: Residual right disc osteophyte complex contacting and indenting the right Hemacord with moderate thecal sac effacement and spinal canal stenosis combined with mild to moderate right and left uncovertebral spurring and facet hypertrophy producing mild to moderate right and mild left neuroforaminal stenosis.  C6-C7: Disc osteophyte complex formation with lateralization to the right, mild to moderate right paracentral spinal canal stenosis with mild to moderate left neuroforaminal stenosis.  C7-T1: No significant stenosis seen.  MRI of the thoracic spine without contrast 5/7/2021: I have reviewed the images.  MRI of the thoracic spine is unremarkable there is no significant spinal canal stenosis or neuroforaminal stenosis seen.  The canal is widely patent and overall normal thoracic cord and alignment.    MRI of the lumbar spine without contrast April 16, 2021: I have reviewed the images.  Minimal retrolisthesis of L2 on L3, otherwise no evidence of malalignment and vertebral body heights are well-maintained. Diffuse spondylitic  changes, greatest at the L2-L3 and L4-L5 levels.  At the L2-L3 level there is a large disc bulge along with moderate to severe ligamentum flavum thickening and moderate facet hypertrophy producing moderate spinal canal stenosis on the right and subarticular recess narrowing.  At the L4-L5 level there is a large circumferential disc bulge with lateralization to the left, moderate to severe spinal canal stenosis on the left, with ligamentum flavum thickening and facet hypertrophy as well as left subarticular recess narrowing.  L1-L2: Mild circumferential disc bulge with mild to moderate ligamentum flavum thickening and facet hypertrophy producing mild anterior thecal sac effacement.  L2-L3: Large circumferential disc bulge with a with predominance to the right, with moderate to severe ligamentum flavum thickening and moderate facet hypertrophy producing moderate right spinal canal stenosis and mild right neuroforaminal stenosis.  L3-L4: Mild to moderate circumferential disc bulge along with moderate to severe ligamentum flavum thickening and moderate facet hypertrophy producing mild left anterior thecal sac effacement and mild spinal canal stenosis with mild right neuroforaminal stenosis.  L4-L5: Large circumferential disc bulge with lateralization to the left combined with severe ligamentum flavum thickening and facet hypertrophy producing moderate to severe left paracentral spinal canal stenosis and left subarticular recess narrowing.  Mild right and moderate left neural foraminal stenosis.  L5-S1: Mild to moderate circumferential disc bulge, with mild bilateral neuroforaminal stenosis.  X-ray lumbar spine with flexion-extension views 4/16/2021: No evidence of instability seen.  Venous duplex of the bilateral lower extremities 4/20/2021: No evidence of DVT seen within the lower extremities.      Review of Diagnostic Studies:    MRI cervical spine, 10/23/2018: Normal cervical alignment.  Vertebral heights are well  maintained. Craniocervical junction is unremarkable.  Moderate degenerative changes in the mid cervical spine. Bone marrow signal is within normal limits, no suspicious osseous lesion is identified. Prevertebral and paraspinal soft tissues are within normal limits. Visualized portions of the posterior fossa are unremarkable. Cervical cord demonstrates normal course, caliber, and signal characteristics.  No epidural fluid collection or hematoma is identified. No area of abnormal enhancement is identified.    C2-C3: no significant disc herniation or protrusion.  No central canal or neural foraminal stenosis is demonstrated.  C3-C4: diffuse disc osteophyte resulting in moderate to advanced central canal stenosis and advance left neural foraminal narrowing. Moderate right neural foraminal narrowing.  C4-C5: diffuse disc osteophyte and uncovertebral degenerative changes causing mild to moderate central canal stenosis and advanced bilateral neural foraminal narrowing.  C5-C6: ossification posterior to the C5-C6 level along with central disc also resulting in severe advanced central canal stenosis. There is advanced bilateral neural foraminal narrowing.  C6-C7: diffuse disc osteophyte and uncovertebral degenerative change causing advanced bilateral neural foraminal narrowing and moderate to advanced central canal stenosis.  C7-T1: no significant disc herniation or protrusion.  No central canal or neural foraminal stenosis is demonstrated.  X-ray lumbar spine, 10/23/2018: Lateral shin and extension views of the lumbar spine are performed.  Alignment is anatomic. No evidence of abnormal subluxation with the lateral flexion and extension views of the lumbar spine. Vertebral body height is normal. No compression anomalies are identified.  There is no evidence of spondylolisthesis. There is moderate degenerative disc space disease at levels of L2-L3 as well as L3-L4.  Spurring is identified along the L2-L4 vertebrae.  MRI  Thoracic Spine, 10/23/2018: Normal alignment is demonstrated. Vertebral heights are well-maintained. Mild degenerative change of the thoracic spine without acute fracture or significant central canal compromise.  The marrow signal is within normal limits, and no suspicious osseous lesion is identified. Prevertebral and paraspinal soft tissues are within normal limits. T8 vertebral body hemangioma present.Posteroinferior T11 vertebral body hemangioma.  Previously described abnormality T10-11 level not seen on current exam appears to be related to prominent ligamentum flavum hypertrophic change. No epidural fluid collection or hematoma is identified.  No area of abnormal enhancement is identified.  No significant central canal stenosis is identified neural foramina are patent throughout  Visualized chest and abdomen are grossly unremarkable.  MRI Lumbar Spine, 10/08/2018: Normal alignment is demonstrated. Vertebral heights are well maintained.  Bone marrow signal intensity is unremarkable.  There is minimal Modic endplate changes demonstrated at the level of L2-L3 secondary to degenerative changes.  No compression anomalies are identified.  There is mild disc desiccation demonstrated at the levels of L2-L3, L3-L4 and L4-L5.  Mild disc space narrowing is present at the level of L2-L3.  The conus and cauda equina appear unremarkable.  L1-L2 disc space: Negative.  L2-L3 disc space: There is a broad circumferential disc protrusion at the level of L2-L3 which results in mild central canal stenosis.  The neural foramen are patent.  L3-L4 disc space: Mild circumferential disc protrusion is identified which results in minimal ventral effacement of the thecal sac.  The neural foramen are patent.  L4-L5 disc space: There is severe facet arthropathy with hypertrophy of the ligamentum flavum.  There is mild circumferential disc protrusion.  This results in severe central canal stenosis.  There is restriction of the proximal  neural foramen bilaterally.  L5-S1 disc space: Negative.  MRI Thoracic Spine, 10/08/2018: Normal alignment is demonstrated. Vertebral heights are well maintained.  There is a hemangioma suspected within the T8 vertebral body to the left with an approximate diameter of 12 mm.  Bone marrow signal in intensity of the vertebral body bone marrow is otherwise unremarkable.  Prevertebral and paraspinal soft tissues are within normal limits.  Thoracic cord demonstrates normal course, caliber, and signal characteristics.  There is an oval shaped density which projects along the posterior aspect of the thoracic spinal cord at the level of the T10-T11 disc space.  There is an oval shaped and is best demonstrated on the sagittal T2 weighted sequence.  It measures approximately 6 mm craniocaudal with an approximate anterior to posterior dimension of 2 mm.  This is identified on image #7 on series 7.  This projects external to the thoracic spinal cord.  No direct axial imaging is performed through this area.  No significant central canal stenosis is identified.  Neural foramina are patent throughout.  The disc spaces of the thoracic spine appear otherwise unremarkable.  Visualized chest and abdomen are grossly unremarkable.    Review of Systems   Constitutional: Positive for activity change and unexpected weight change.   HENT: Positive for sore throat.    Genitourinary: Positive for urgency.   Musculoskeletal: Positive for arthralgias and back pain.   Neurological: Positive for weakness.   Psychiatric/Behavioral: Positive for sleep disturbance. The patient is nervous/anxious.    All other systems reviewed and are negative.     Patient Active Problem List   Diagnosis   • Cerebral palsy (CMS/HCC)   • L4-5 Lumbar spinal stenosis with neurogenic claudication   • Spondylosis of lumbar region without myelopathy or radiculopathy   • DDD (degenerative disc disease), lumbar   • Hypertrophy of ligamentum flavum (CMS/HCC)   • Greater  trochanteric bursitis of left hip   • At high risk for falls   • Abnormality of gait due to impairment of balance   • Cervical myelopathy with falls, weakness, incontinence (CMS/HCC)   • Myelopathy (CMS/HCC)   • S/P anterior cervical diskectomy of C4-5, 5-6, 6-7 as well as corpectomy 1/23/19       Past Medical History:   Diagnosis Date   • Anxiety and depression    • Arthritis    • Back problem    • Bowel incontinence     at night    • Cerebral palsy (CMS/HCC)    • Cerebral palsy (CMS/HCC)    • Diverticulosis    • Esophageal spasm    • Glaucoma suspect    • Hearing loss    • History of seizure     as a child   • Hypertension    • IBS (irritable bowel syndrome)    • PONV (postoperative nausea and vomiting)    • Pre-diabetes    • Wears glasses          Past Surgical History:   Procedure Laterality Date   • ACHILLES TENDON LENGTHENING      on both heels   • ANTERIOR CERVICAL DISCECTOMY W/ FUSION N/A 1/23/2019    Procedure: CERVICAL CORPECTOMY C5-C6 ACDF C4-7;  Surgeon: Jordon Villarreal MD;  Location: [facility];  Service: Neurosurgery   • BUNIONECTOMY Bilateral    • CHOLECYSTECTOMY     • COLONOSCOPY     • CYST REMOVAL     • DILATATION AND CURETTAGE      x2   • ENDOSCOPY           Family History   Problem Relation Age of Onset   • Arthritis Mother    • Asthma Mother    • Cancer Mother    • Hypertension Mother    • Cancer Father    • Parkinsonism Father    • Diabetes Brother    • Hypertension Brother    • Cancer Maternal Aunt    • Diabetes Maternal Uncle    • Heart disease Paternal Grandfather          Social History     Socioeconomic History   • Marital status:      Spouse name: Not on file   • Number of children: Not on file   • Years of education: Not on file   • Highest education level: Not on file   Tobacco Use   • Smoking status: Never Smoker   • Smokeless tobacco: Never Used   Vaping Use   • Vaping Use: Never used   Substance and Sexual Activity   • Alcohol use: No   • Drug use: No   • Sexual activity:  "Defer           Current Outpatient Medications:   •  celecoxib (CeleBREX) 200 MG capsule, TAKE 1 CAPSULE BY MOUTH EVERY DAY WITH FOOD, Disp: , Rfl: 11  •  cholecalciferol (VITAMIN D3) 1000 units tablet, Take 1,000 Units by mouth Daily., Disp: , Rfl:   •  CloNIDine (CATAPRES) 0.1 MG tablet, Take 0.1 mg by mouth every night at bedtime., Disp: , Rfl: 0  •  colesevelam (WELCHOL) 625 MG tablet, Take 1,875 mg by mouth As Needed (For IBS symptoms)., Disp: , Rfl:   •  DULoxetine (CYMBALTA) 60 MG capsule, , Disp: , Rfl:   •  latanoprost (XALATAN) 0.005 % ophthalmic solution, INSTILL 1 DROP IN EACH EYE AT BEDTIME, Disp: , Rfl: 11  •  pregabalin (LYRICA) 150 MG capsule, Take 150 mg by mouth every night at bedtime., Disp: , Rfl:   •  ROPINIROLE HCL PO, Take 0.75 mg by mouth Every Night., Disp: , Rfl:   •  tiZANidine (ZANAFLEX) 4 MG tablet, Take 12 mg by mouth At Night As Needed for Muscle Spasms., Disp: , Rfl:   •  traMADol (ULTRAM) 50 MG tablet, Take 50 mg by mouth Every 6 (Six) Hours As Needed for Moderate Pain ., Disp: , Rfl:       Allergies   Allergen Reactions   • Percocet [Oxycodone-Acetaminophen] Hives   • Lanolin Rash         Pulse 85   Temp 97.3 °F (36.3 °C)   Ht 162.6 cm (64\")   Wt 107 kg (236 lb)   SpO2 96%   BMI 40.51 kg/m²       Physical Exam:  Constitutional: Patient is oriented to person, place, and time. Patient appears well-developed and well-nourished.   HEENT: Head: Normocephalic and atraumatic. Eyes: Conjunctivae and lids are normal. Pupils: Equal, round, reactive to light.   Neck: Trachea normal. Neck supple. No JVD present.   Pulmonary Respiratory effort: No increased work of breathing or signs of respiratory distress.   Cardiovascular Auscultation of heart: Normal rate and rhythm, normal S1 and S2, no murmurs.   Peripheral vascular exam: Posterior tibialis: right 2+ and left 2+. Dorsalis pedis: right 2+ and left 2+. 1+ edema.   Musculoskeletal   Gait and station: Gait evaluation demonstrated spastic " gait limited ambulation  Lumbar spine: Passive and active range of motion are appropriate for her condition with minimal pain. Extension, flexion, lateral flexion, rotation of the lumbar spine increased and reproduced some pain. Lumbar facet joint loading maneuvers are equivocal.  Darek and Gaenslen's tests are deferred (spasticity in adductors)  Piriformis maneuvers are negative   Palpation of the bilateral ischial tuberosities, unrevealing   Palpation of the bilateral greater trochanter, reveals tenderness on the left   Examination of the Iliotibial band: reveals tenderness on the left   Hip joints: The range of motion of the hip joints is limited secondary to spasticity and tightness  Neurological: Patient is alert and oriented to person, place, and time. Speech: speech is normal. Cortical function: Normal mental status.   Cranial nerves: Cranial nerves 2-12 intact.   Reflex Scores:  Right patellar: 3+  Left patellar: 3+  Right Achilles: 2+  Left Achilles: 2+  Motor strength: 5/5, except dorsiflexion and plantar flexion of her feet.  Motor Tone: normal tone normal in the upper body. Spasticity mainly adductors, hamstrings.   Involuntary movements: none.   Superficial/Primitive Reflexes: primitive reflexes were absent.   Right Stern: absent  Left Stern: absent  Right ankle clonus: absent  Left ankle clonus: absent   Negative long tract signs. Straight leg raising test is negative. Femoral stretch sign is negative.   Sensation: No sensory loss. Sensory exam: intact to light touch, intact pain and temperature sensation, intact vibration sensation and  normal proprioception.   Coordination: Normal finger to nose. Lower body limited due to impaired gait and balance   Skin and subcutaneous tissue: Skin is warm and intact. No rash noted. No cyanosis.   Psychiatric: Judgment and insight: Normal. Orientation to person, place and time: Normal. Recent and remote memory: Intact. Mood and affect: Normal.     ASSESSMENT:    1. Lumbar stenosis with neurogenic claudication    2. DDD (degenerative disc disease), lumbar    3. Spondylosis of lumbar region without myelopathy or radiculopathy    4. Hypertrophy of ligamentum flavum (CMS/HCC)    5. History of fusion of cervical spine    6. At high risk for falls    7. Abnormality of gait due to impairment of balance    8. Spastic diplegic cerebral palsy (CMS/HCC)      PLAN/MEDICAL DECISION MAKING: I had a lengthy conversation with Ms. Nikole Beltre regarding her chronic pain condition and potential therapeutic options including risks, benefits, alternative therapies, to name a few. Patient has failed to obtain pain relief with conservative measures, as referenced above.  She reports a now 3-year history of lower back pain.  She has been following with Dr. Jordon Villarreal with neurosurgery.  She had a follow-up with Dr. Villarreal in December 2018, for her lower back but was found to have some evidence of cervical myelopathic symptoms.  Subsequently, on January 23, 2019, she underwent C5 and C6 corpectomy with anterior cervical fusion C4-C7.  She has done well with resolution of her myelopathy and noted some improvement in her lower back.  Unfortunately, over the last several months she has been experiencing recurrent severe lower back pain, with significant intolerance to standing and walking.  Her current pain radiates across her low back and immediately upon standing she will begin to experience radiation into her buttocks and hips.  She has a history of cerebral palsy, and has weakness and spasticity at baseline.  MRI of the lumbar spine revealed diffuse spondylitic changes, greatest at the L2-L3 and L4-L5 levels.  At the L2-L3 level there is a large disc bulge along with moderate to severe ligamentum flavum thickening and moderate facet hypertrophy producing moderate spinal canal stenosis on the right and subarticular recess narrowing.  At the L4-L5 level there is a large circumferential disc  bulge with lateralization to the left, moderate to severe spinal canal stenosis on the left, with ligamentum flavum thickening and facet hypertrophy as well as left subarticular recess narrowing. She recently underwent neurosurgical follow-up consultation Dr. Jordon Villarreal on May 10, 2021, was found to have significant lumbar disease at L2-L3 and L4-L5 and discussion of multilevel lumbar laminectomies with decompression was considered.she is struggling significantly, with decreased conditioning.  Despite her diagnosis of CP, she has remained very active in the gym, water therapies, and activities outside of the home.  Her pain is hindering on most of her daily activities, and she would like to return to ambulation with assistive devices.  She is currently in a wheelchair most of the time.  I have reviewed all available patient's medical records as well as previous therapies as referenced above.  Therefore, I have proposed the following plan:  1. Pharmacological measures: Reviewed. Discussed.   A. Patient takes Lyrica, tramadol, tizanidine, Celebrex  2. Interventional pain management measures: Patient will be scheduled for bilateral L4-L5 transforaminal epidural steroid injection. We may repeat another epidural depending on patient's outcome.  Patient will follow-up with Dr. Villarreal thereafter for possible possible surgical intervention.   3. Long-term rehabilitation efforts:  A. The patient does not have a history of falls. I did complete a risk assessment for falls. Patient has risk factors. Fall precautions: Patient has been instructed regarding universal fall precautions, such as;   · Using gait aids two crutches at the appropriate height at all times for ambulation or wheelchair  · Removing all area rugs and coffee tables to create a safe environment at home  · Ensure clean, dry floors  · Wearing supportive footwear and properly fitting clothing  · Ensure bed/chair is appropriate height and patient's feet can  touch the floor  · Using a shower transfer bench  · Using walk-in shower and having shower safety bars installed  · Ensure proper lighting, minimize glare  · Have nightlights operational and in use  · Participation in an exercise program for gait training, balance training and strength  · Ensure proper compliance and organization of medications to avoid errors   · Avoid use of over the counter sedatives and alcohol consumption  · Ensure easy access to call bell, glasses, TV control, telephone  · Ensure glasses/hearing aids are in use or close by (on top of night table)  B.   Patient will start a comprehensive physical therapy program for water therapy, therapeutic exercise, core strengthening, gait and balance training, myofascial release, cupping and dry needling   C.   Resume water therapy and swimming  D.   Contrast therapy: Apply ice-packs for 15-20 minutes, followed by heating pads for 15-20 minutes to affected area   4.  The patient and her family have been instructed to contact my office with any questions or difficulties. The patient understands the plan and agrees to proceed accordingly.       Patient Care Team:  Felix Dahl MD as PCP - General (Family Medicine)  Damion Marie MD as Consulting Physician (Pain Medicine)  Jordon Villarreal MD as Consulting Physician (Neurosurgery)  Felix Dahl MD as Referring Physician (Family Medicine)     No orders of the defined types were placed in this encounter.        No future appointments.      CAT [place]by/Transcription disclaimer:  Much of this encounter note is an electronic transcription of spoken language to printed text. Electronic transcription of spoken language may permit erroneous, or at times, nonsensical words or phrases to be inadvertently transcribed. Although I have reviewed the note for such errors, some may still exist.

## 2021-06-17 ENCOUNTER — OFFICE VISIT (OUTPATIENT)
Dept: PAIN MEDICINE | Facility: CLINIC | Age: 66
End: 2021-06-17

## 2021-06-17 VITALS
OXYGEN SATURATION: 96 % | HEART RATE: 85 BPM | BODY MASS INDEX: 40.29 KG/M2 | WEIGHT: 236 LBS | TEMPERATURE: 97.3 F | HEIGHT: 64 IN

## 2021-06-17 DIAGNOSIS — Z98.1 HISTORY OF FUSION OF CERVICAL SPINE: ICD-10-CM

## 2021-06-17 DIAGNOSIS — M48.062 LUMBAR STENOSIS WITH NEUROGENIC CLAUDICATION: ICD-10-CM

## 2021-06-17 DIAGNOSIS — G80.1 SPASTIC DIPLEGIC CEREBRAL PALSY (HCC): ICD-10-CM

## 2021-06-17 DIAGNOSIS — M51.36 DDD (DEGENERATIVE DISC DISEASE), LUMBAR: ICD-10-CM

## 2021-06-17 DIAGNOSIS — R26.89 ABNORMALITY OF GAIT DUE TO IMPAIRMENT OF BALANCE: ICD-10-CM

## 2021-06-17 DIAGNOSIS — M48.062 LUMBAR STENOSIS WITH NEUROGENIC CLAUDICATION: Primary | ICD-10-CM

## 2021-06-17 DIAGNOSIS — M24.28 HYPERTROPHY OF LIGAMENTUM FLAVUM: ICD-10-CM

## 2021-06-17 DIAGNOSIS — M47.816 SPONDYLOSIS OF LUMBAR REGION WITHOUT MYELOPATHY OR RADICULOPATHY: ICD-10-CM

## 2021-06-17 DIAGNOSIS — Z91.81 AT HIGH RISK FOR FALLS: ICD-10-CM

## 2021-06-17 PROCEDURE — 99214 OFFICE O/P EST MOD 30 MIN: CPT | Performed by: NURSE PRACTITIONER

## 2021-06-30 DIAGNOSIS — M48.062 LUMBAR STENOSIS WITH NEUROGENIC CLAUDICATION: Primary | ICD-10-CM

## 2021-07-07 ENCOUNTER — OUTSIDE FACILITY SERVICE (OUTPATIENT)
Dept: PAIN MEDICINE | Facility: CLINIC | Age: 66
End: 2021-07-07

## 2021-07-07 PROCEDURE — 99152 MOD SED SAME PHYS/QHP 5/>YRS: CPT | Performed by: ANESTHESIOLOGY

## 2021-07-07 PROCEDURE — 64483 NJX AA&/STRD TFRM EPI L/S 1: CPT | Performed by: ANESTHESIOLOGY

## 2021-07-08 ENCOUNTER — TELEPHONE (OUTPATIENT)
Dept: PAIN MEDICINE | Facility: CLINIC | Age: 66
End: 2021-07-08

## 2021-07-08 NOTE — TELEPHONE ENCOUNTER
Spoke with pt regarding yesterday’s procedure with Dr. Marie. Pt stated that yesterday she had loss of sensation in legs from the sedation while trying to go to the restroom. Pt stated that her  was unable to assist her so they had to call the fire department to assist getting her up. About the time the fire department arrived, she had feeling back in her legs and was feeling fine. Pt stated that she is feeling fine today.  Advised that there’s not a follow up appointment, to call as needed.

## 2021-08-06 ENCOUNTER — TELEPHONE (OUTPATIENT)
Dept: NEUROSURGERY | Facility: CLINIC | Age: 66
End: 2021-08-06

## 2021-08-06 NOTE — TELEPHONE ENCOUNTER
Should be ok to proceed with surgery but patient has not been seen in 3 months. Please set her up for OV or televisit with Jet the next week when savanna is back in office to discuss and get scheduled.

## 2021-08-06 NOTE — TELEPHONE ENCOUNTER
Pt called: she had her 1st JULIO 4 weeks ago and said it did not provide any relief. She would like to move forward with sx.     Last office visit: 5/10/21  Diagnoses/Plan:    Ms. Beltre is a 66 y.o. female   I talked her about her symptoms obviously is a complex mobility issue she has cervical spondylitic myelopathy history of cerebral palsy and advancing age she has been very motivated throughout the years and has been able to essentially work through all of these mobility issues and right now we talked that a lot of this is multifactorial I do think that the predominant symptom coming through for her however is L4-5 spinal stenosis is worse on her scans she also has L2-3 disease as well I think is not unreasonable to proceed with an L5 laminectomy and decompression at the L4 area she would also need an L3 laminectomy as well to decompress all the levels I talked her about the risk benefits expected outcome of the surgery more importantly about the failure to not improve.     I talked her about some strategies wearing compression hose to see if this would assist with getting some of her edema in her lower extremities off     Her duplex is reviewed and they did not show the presence of any DVTs     Julia try a lumbar epidural steroid block 1 time to see if that will assist with pain control may be we can avoid surgery but she is to call us if she wishes to proceed with laminectomy L5 partial L4 as well as L3      Please advise!    Pt contact: [phone]  Best time to call: anytime

## 2021-08-06 NOTE — TELEPHONE ENCOUNTER
Provider:  Dr. Villarreal   Caller: Patient call in to [facility]  Time of call:   --  Phone #:  [phone]  Surgery:  --  Surgery Date:  --  Last visit:   Office Visit with Jordon Villarreal MD (05/10/2021)    Next visit: --           Reason for call:         Please see HUB note below. Patient wants to move forward with Surgery.     Pt called: she had her 1st JULIO 4 weeks ago and said it did not provide any relief. She would like to move forward with sx.      Last office visit: 5/10/21  Diagnoses/Plan:    Ms. Beltre is a 66 y.o. female   I talked her about her symptoms obviously is a complex mobility issue she has cervical spondylitic myelopathy history of cerebral palsy and advancing age she has been very motivated throughout the years and has been able to essentially work through all of these mobility issues and right now we talked that a lot of this is multifactorial I do think that the predominant symptom coming through for her however is L4-5 spinal stenosis is worse on her scans she also has L2-3 disease as well I think is not unreasonable to proceed with an L5 laminectomy and decompression at the L4 area she would also need an L3 laminectomy as well to decompress all the levels I talked her about the risk benefits expected outcome of the surgery more importantly about the failure to not improve.     I talked her about some strategies wearing compression hose to see if this would assist with getting some of her edema in her lower extremities off     Her duplex is reviewed and they did not show the presence of any DVTs     Julia try a lumbar epidural steroid block 1 time to see if that will assist with pain control may be we can avoid surgery but she is to call us if she wishes to proceed with laminectomy L5 partial L4 as well as L3        Please advise!

## 2021-08-17 ENCOUNTER — PREP FOR SURGERY (OUTPATIENT)
Dept: OTHER | Facility: HOSPITAL | Age: 66
End: 2021-08-17

## 2021-08-17 ENCOUNTER — OFFICE VISIT (OUTPATIENT)
Dept: NEUROSURGERY | Facility: CLINIC | Age: 66
End: 2021-08-17

## 2021-08-17 VITALS
DIASTOLIC BLOOD PRESSURE: 92 MMHG | WEIGHT: 210.2 LBS | BODY MASS INDEX: 35.89 KG/M2 | HEIGHT: 64 IN | TEMPERATURE: 97.5 F | SYSTOLIC BLOOD PRESSURE: 152 MMHG

## 2021-08-17 DIAGNOSIS — M51.36 DDD (DEGENERATIVE DISC DISEASE), LUMBAR: ICD-10-CM

## 2021-08-17 DIAGNOSIS — M48.062 LUMBAR STENOSIS WITH NEUROGENIC CLAUDICATION: ICD-10-CM

## 2021-08-17 DIAGNOSIS — G80.1 SPASTIC DIPLEGIC CEREBRAL PALSY (HCC): Primary | ICD-10-CM

## 2021-08-17 DIAGNOSIS — Z98.890 S/P DISKECTOMY: ICD-10-CM

## 2021-08-17 DIAGNOSIS — M48.062 LUMBAR STENOSIS WITH NEUROGENIC CLAUDICATION: Primary | ICD-10-CM

## 2021-08-17 DIAGNOSIS — G95.9 MYELOPATHY (HCC): ICD-10-CM

## 2021-08-17 PROCEDURE — 99214 OFFICE O/P EST MOD 30 MIN: CPT | Performed by: PHYSICIAN ASSISTANT

## 2021-08-17 RX ORDER — SODIUM CHLORIDE 0.9 % (FLUSH) 0.9 %
3-10 SYRINGE (ML) INJECTION AS NEEDED
Status: CANCELLED | OUTPATIENT
Start: 2021-08-17

## 2021-08-17 RX ORDER — CHLORHEXIDINE GLUCONATE 4 G/100ML
SOLUTION TOPICAL
Qty: 120 ML | Refills: 0 | Status: ON HOLD | OUTPATIENT
Start: 2021-08-17 | End: 2021-11-10

## 2021-08-17 RX ORDER — SODIUM CHLORIDE, SODIUM LACTATE, POTASSIUM CHLORIDE, CALCIUM CHLORIDE 600; 310; 30; 20 MG/100ML; MG/100ML; MG/100ML; MG/100ML
9 INJECTION, SOLUTION INTRAVENOUS CONTINUOUS
Status: CANCELLED | OUTPATIENT
Start: 2021-08-17

## 2021-08-17 RX ORDER — HYDROCODONE BITARTRATE AND ACETAMINOPHEN 7.5; 325 MG/1; MG/1
1 TABLET ORAL ONCE
Status: CANCELLED | OUTPATIENT
Start: 2021-08-17 | End: 2021-08-17

## 2021-08-17 RX ORDER — SODIUM CHLORIDE 0.9 % (FLUSH) 0.9 %
3 SYRINGE (ML) INJECTION EVERY 12 HOURS SCHEDULED
Status: CANCELLED | OUTPATIENT
Start: 2021-08-17

## 2021-08-17 RX ORDER — FAMOTIDINE 20 MG/1
20 TABLET, FILM COATED ORAL
Status: CANCELLED | OUTPATIENT
Start: 2021-08-17

## 2021-08-17 RX ORDER — ACETAMINOPHEN 325 MG/1
650 TABLET ORAL ONCE
Status: CANCELLED | OUTPATIENT
Start: 2021-08-17 | End: 2021-08-17

## 2021-08-17 RX ORDER — IBUPROFEN 800 MG/1
800 TABLET ORAL ONCE
Status: CANCELLED | OUTPATIENT
Start: 2021-08-17 | End: 2021-08-17

## 2021-08-17 RX ORDER — PANTOPRAZOLE SODIUM 40 MG/1
40 TABLET, DELAYED RELEASE ORAL NIGHTLY
COMMUNITY
Start: 2021-08-08

## 2021-08-17 RX ORDER — CEFAZOLIN SODIUM 2 G/100ML
2 INJECTION, SOLUTION INTRAVENOUS ONCE
Status: CANCELLED | OUTPATIENT
Start: 2021-08-17 | End: 2021-08-17

## 2021-08-17 NOTE — PROGRESS NOTES
Patient: Nikole Beltre  : 1955    Primary Care Provider: Felix Dahl MD      Chief Complaint: Low back bilateral hip pain    History of Present Illness:       Patient is a very nice 66-year-old female who is known to the neurosurgical practice for undergoing a vertically a two-level cervical corpectomy for OPLL.  She did relatively well with that she had a pre-existing history of cervical myelopathy along with cerebral palsy that makes her ambulatory status somewhat questionable.  Patient was seen back with MRI of the lumbar spine that showed focal stenoses at L2-3 as well as L4-5 which have been causing her to have difficulty and pain in her bilateral lower hips across her low back whenever she tries to walk.  Patient does okay whenever she sits but she is very active and actively fighting her mobility issues with CP.    At last visit we were trying to avoid any surgeries and sent her for injections in the lumbar spine that really gave her very little relief if any.    Patient is back today to discuss her surgical options.  I Dr. Villarreal, and discussed this with her and he went over risk benefits and expected outcomes again on top of the time that I spent with her trying to optimize her understanding of our expectations with the surgery.  Patient and  are both understanding wish to proceed with a lumbar laminectomy.    Review of Systems   Constitutional: Negative for activity change, appetite change, chills, diaphoresis, fatigue, fever and unexpected weight change.   HENT: Negative for congestion, dental problem, drooling, ear discharge, ear pain, facial swelling, hearing loss, mouth sores, nosebleeds, postnasal drip, rhinorrhea, sinus pressure, sinus pain, sneezing, sore throat, tinnitus, trouble swallowing and voice change.    Eyes: Negative for photophobia, pain, discharge, redness, itching and visual disturbance.   Respiratory: Negative for apnea, cough, choking, chest tightness, shortness  of breath, wheezing and stridor.    Cardiovascular: Negative for chest pain, palpitations and leg swelling.   Gastrointestinal: Negative for abdominal distention, abdominal pain, anal bleeding, blood in stool, constipation, diarrhea, nausea, rectal pain and vomiting.   Endocrine: Negative for cold intolerance, heat intolerance, polydipsia, polyphagia and polyuria.   Genitourinary: Negative for decreased urine volume, difficulty urinating, dyspareunia, dysuria, enuresis, flank pain, frequency, genital sores, hematuria, menstrual problem, pelvic pain, urgency, vaginal bleeding, vaginal discharge and vaginal pain.   Musculoskeletal: Positive for back pain, joint swelling and neck pain. Negative for arthralgias, gait problem, myalgias and neck stiffness.   Skin: Negative for color change, pallor, rash and wound.   Allergic/Immunologic: Negative for environmental allergies, food allergies and immunocompromised state.   Neurological: Positive for weakness. Negative for dizziness, tremors, seizures, syncope, facial asymmetry, speech difficulty, light-headedness, numbness and headaches.   Hematological: Negative for adenopathy. Does not bruise/bleed easily.   Psychiatric/Behavioral: Negative for agitation, behavioral problems, confusion, decreased concentration, dysphoric mood, hallucinations, self-injury, sleep disturbance and suicidal ideas. The patient is not nervous/anxious and is not hyperactive.        Past Medical History:     Past Medical History:   Diagnosis Date   • Anxiety and depression    • Arthritis    • Back problem    • Bowel incontinence     at night    • Cerebral palsy (CMS/HCC)    • Diverticulosis    • Esophageal spasm    • Glaucoma suspect    • Hearing loss    • History of seizure     as a child   • Hypertension    • IBS (irritable bowel syndrome)    • PONV (postoperative nausea and vomiting)    • Pre-diabetes    • Wears glasses        Family History:     Family History   Problem Relation Age of Onset  "  • Arthritis Mother    • Asthma Mother    • Cancer Mother    • Hypertension Mother    • Cancer Father    • Parkinsonism Father    • Diabetes Brother    • Hypertension Brother    • Cancer Maternal Aunt    • Diabetes Maternal Uncle    • Heart disease Paternal Grandfather        Social History:    reports that she has never smoked. She has never used smokeless tobacco. She reports that she does not drink alcohol and does not use drugs.   SMOKING STATUS: Non-smoker    Surgical History:     Past Surgical History:   Procedure Laterality Date   • ACHILLES TENDON LENGTHENING      on both heels   • ANTERIOR CERVICAL DISCECTOMY W/ FUSION N/A 1/23/2019    Procedure: CERVICAL CORPECTOMY C5-C6 ACDF C4-7;  Surgeon: Jordon Villarreal MD;  Location: [facility];  Service: Neurosurgery   • BUNIONECTOMY Bilateral    • CHOLECYSTECTOMY     • COLONOSCOPY     • CYST REMOVAL     • DILATATION AND CURETTAGE      x2   • ENDOSCOPY         Allergies:   Percocet [oxycodone-acetaminophen] and Lanolin    Physical Exam:    Vital Signs:/92 (BP Location: Right arm, Patient Position: Sitting, Cuff Size: Adult)   Temp 97.5 °F (36.4 °C) (Infrared)   Ht 162.6 cm (64\")   Wt 95.3 kg (210 lb 3.2 oz)   BMI 36.08 kg/m²    BMI: Body mass index is 36.08 kg/m².     GENERAL:           The patient is in no acute distress, and is able to answer all questions appropriately.    Neck:          Supple without lymphadenopathy    Cardiovascular:       Peripheral pulses 2+ at dorsalis pedis and posterior tibialis    Lungs:         Breathing unlabored    Musculoskeletal:            strength is 5 out of 5 bilaterally.        Shoulder abduction is 5 out of 5.         Dorsiflexion is 5/5 on right 4-5 on left       Plantarflexion is 5/5 on right 4-5 on left       Hip Flexion 5/5 on right 4-5 on left       Gait not tested    Neurologic:          The patient is alert and oriented by 3.          Pupils are equal and reactive to light.         Visual fields are " full.         Extraocular movements are intact without nystagmus.         There is no evidence of central motor drift. No facial droop.  No difficulty with rapid alternating movements.         Sensation is equal bilaterally with no deficit.           Reflexes:  2+ through out  Mild Raymond's on left but absent on right     CRANIAL NERVES:    Deferred secondary to Covid mask    Medical Decision Making    Data Review:   MRI cervical and MRI lumbar spine reviewed once again but no new films reviewed at this visit    Diagnosis:   Cervical myelopathy   Status post two-level corpectomy cervical  Cerebral palsy  Decreased functional mobility  Obesity  Lumbar spinal stenosis   Neurogenic claudication    Treatment Options:   Patient is a very nice 66-year-old female who is in good rapport with our practice.  Patient was seen through very difficult case and she did really well with her cervical procedure.    That being said we reiterated the concerns about this improving her mobility but hopefully this does improve her pain that is neurogenic in nature.  Patient understands she may continue to have some low back pain.  And this may fail to improve her overall mobility with the complexity of her CP cervical myelopathy etc.    Having said patient is not able to walk currently which is seemingly getting progressive in nature secondary to the her low back pain.  Hopefully by alleviating the back pain this will get her back to ambulating and walking around where she can try to regain some strength.    As explained in HPI Dr. Villarreal came in and discussed the procedure with the patient and  and all were in agreement is that at L4-5 lumbar laminectomy will be in the patient's best interest.  This is nonurgent and we will do as OR time allows per Covid recommendation.    Patient's Body mass index is 36.08 kg/m². indicating that she is morbidly obese (BMI > 40 or > 35 with obesity - related health condition). Obesity-related  health conditions include the following: none. Obesity is worsening. BMI is is above average; BMI management plan is completed. We discussed portion control and increasing exercise.     Diagnosis Plan   1. Spastic diplegic cerebral palsy (CMS/HCC)     2. L4-5 Lumbar spinal stenosis with neurogenic claudication     3. Myelopathy (CMS/HCC)     4. DDD (degenerative disc disease), lumbar     5. S/P anterior cervical diskectomy of C4-5, 5-6, 6-7 as well as corpectomy 1/23/19

## 2021-08-23 NOTE — TELEPHONE ENCOUNTER
Provider:  Phil  Caller:  Automated refill request  Surgery:  LUMBAR LAMINECTOMY L4-5  Surgery Date:  09/01/2021  Last visit:  08/17/2021  Next visit: Surgery    Reason for call:       Please sign pre-op medication.       Requested Prescriptions     Pending Prescriptions Disp Refills   • mupirocin (Bactroban) 2 % ointment 15 g 0     Sig: Apply  topically to the appropriate area as directed 3 (Three) Times a Day. Start after PAT. Apply to each nostril with a cotton swab, morning & evening.

## 2021-08-30 ENCOUNTER — APPOINTMENT (OUTPATIENT)
Dept: PREADMISSION TESTING | Facility: HOSPITAL | Age: 66
End: 2021-08-30

## 2021-11-08 ENCOUNTER — ANESTHESIA EVENT (OUTPATIENT)
Dept: PERIOP | Facility: HOSPITAL | Age: 66
End: 2021-11-08

## 2021-11-08 ENCOUNTER — PRE-ADMISSION TESTING (OUTPATIENT)
Dept: PREADMISSION TESTING | Facility: HOSPITAL | Age: 66
End: 2021-11-08

## 2021-11-08 VITALS — WEIGHT: 226.19 LBS | HEIGHT: 64 IN | BODY MASS INDEX: 38.62 KG/M2

## 2021-11-08 DIAGNOSIS — M48.062 LUMBAR STENOSIS WITH NEUROGENIC CLAUDICATION: ICD-10-CM

## 2021-11-08 LAB
ANION GAP SERPL CALCULATED.3IONS-SCNC: 13 MMOL/L (ref 5–15)
BUN SERPL-MCNC: 26 MG/DL (ref 8–23)
BUN/CREAT SERPL: 38.8 (ref 7–25)
CALCIUM SPEC-SCNC: 9.4 MG/DL (ref 8.6–10.5)
CHLORIDE SERPL-SCNC: 101 MMOL/L (ref 98–107)
CO2 SERPL-SCNC: 25 MMOL/L (ref 22–29)
CREAT SERPL-MCNC: 0.67 MG/DL (ref 0.57–1)
DEPRECATED RDW RBC AUTO: 44.5 FL (ref 37–54)
ERYTHROCYTE [DISTWIDTH] IN BLOOD BY AUTOMATED COUNT: 13.3 % (ref 12.3–15.4)
GFR SERPL CREATININE-BSD FRML MDRD: 89 ML/MIN/1.73
GLUCOSE SERPL-MCNC: 99 MG/DL (ref 65–99)
HCT VFR BLD AUTO: 51.8 % (ref 34–46.6)
HGB BLD-MCNC: 16.3 G/DL (ref 12–15.9)
MCH RBC QN AUTO: 28.4 PG (ref 26.6–33)
MCHC RBC AUTO-ENTMCNC: 31.5 G/DL (ref 31.5–35.7)
MCV RBC AUTO: 90.4 FL (ref 79–97)
PLATELET # BLD AUTO: 307 10*3/MM3 (ref 140–450)
PMV BLD AUTO: 10 FL (ref 6–12)
POTASSIUM SERPL-SCNC: 4.3 MMOL/L (ref 3.5–5.2)
QT INTERVAL: 370 MS
QTC INTERVAL: 442 MS
RBC # BLD AUTO: 5.73 10*6/MM3 (ref 3.77–5.28)
SARS-COV-2 RNA PNL SPEC NAA+PROBE: NOT DETECTED
SODIUM SERPL-SCNC: 139 MMOL/L (ref 136–145)
WBC # BLD AUTO: 7.13 10*3/MM3 (ref 3.4–10.8)

## 2021-11-08 PROCEDURE — U0004 COV-19 TEST NON-CDC HGH THRU: HCPCS

## 2021-11-08 PROCEDURE — 80048 BASIC METABOLIC PNL TOTAL CA: CPT

## 2021-11-08 PROCEDURE — 85027 COMPLETE CBC AUTOMATED: CPT

## 2021-11-08 PROCEDURE — C9803 HOPD COVID-19 SPEC COLLECT: HCPCS

## 2021-11-08 PROCEDURE — 93010 ELECTROCARDIOGRAM REPORT: CPT | Performed by: INTERNAL MEDICINE

## 2021-11-08 PROCEDURE — 93005 ELECTROCARDIOGRAM TRACING: CPT

## 2021-11-08 PROCEDURE — 36415 COLL VENOUS BLD VENIPUNCTURE: CPT

## 2021-11-08 NOTE — PAT
Patient did not review general PAT education video as instructed in their preoperative information received from their surgeon.  One-on-one Pre Admission Testing general education provided during PAT visit.  Copies of PAT general education handouts (Incentive Spirometry, Meds to Beds Program, Patient Belongings, Pre-op skin preparation instructions, Blood Glucose testing, Visitor policy, Surgery FAQ, Code H) distributed to patient. Encouraged patient/family to read PAT general education handouts thoroughly and notify PAT staff with any questions or concerns. Patient instructed to bring PAT pass and completed skin prep sheet (if applicable) on the day of procedure. Patient verbalized understanding of all information and priority content.     Patient to apply Chlorhexadine wipes  to surgical area (as instructed) the night before procedure and the AM of procedure. Wipes provided.    Bactroban and Chlorhexidine Prescription prescribed by physician before PAT visit.  Verified with patient that medications were picked up from their pharmacy.  Written instructions given to patient during PAT visit.  Patient/family also instructed to complete skin prep checklist and return the checklist on the day of surgery to preoperative staff.  Patient/family verbalized understanding.    Patient instructed to drink 20 ounces (or until full) of Gatorade and it needs to be completed 1 hour (for Main OR patients) or 2 hours (scheduled  section patients) before given arrival time for procedure (NO RED Gatorade)    Patient verbalized understanding.    No MRSA swab, patient using bactroban in nose.     EKG cleared by Dr. Josue.    Patient wanted me to noted that her prior surgery, D&C, she was having pain and the PACU nurse gave her morphine. She said that her BP dropped and she experienced respiratory depression. She has spoken with Dr. Villarreal about this issue. She also wanted anesthesia to be aware.

## 2021-11-10 ENCOUNTER — ANESTHESIA (OUTPATIENT)
Dept: PERIOP | Facility: HOSPITAL | Age: 66
End: 2021-11-10

## 2021-11-10 ENCOUNTER — APPOINTMENT (OUTPATIENT)
Dept: GENERAL RADIOLOGY | Facility: HOSPITAL | Age: 66
End: 2021-11-10

## 2021-11-10 ENCOUNTER — HOSPITAL ENCOUNTER (OUTPATIENT)
Facility: HOSPITAL | Age: 66
Discharge: HOME | End: 2021-11-11
Attending: NEUROLOGICAL SURGERY | Admitting: NEUROLOGICAL SURGERY

## 2021-11-10 DIAGNOSIS — M48.061 SPINAL STENOSIS OF LUMBAR REGION WITHOUT NEUROGENIC CLAUDICATION: Primary | ICD-10-CM

## 2021-11-10 DIAGNOSIS — M48.062 LUMBAR STENOSIS WITH NEUROGENIC CLAUDICATION: ICD-10-CM

## 2021-11-10 LAB — GLUCOSE BLDC GLUCOMTR-MCNC: 184 MG/DL (ref 70–130)

## 2021-11-10 PROCEDURE — 63047 LAM FACETEC & FORAMOT LUMBAR: CPT | Performed by: PHYSICIAN ASSISTANT

## 2021-11-10 PROCEDURE — 63710000001 LATANOPROST 0.005 % SOLUTION 2.5 ML BOTTLE: Performed by: NEUROLOGICAL SURGERY

## 2021-11-10 PROCEDURE — 82962 GLUCOSE BLOOD TEST: CPT

## 2021-11-10 PROCEDURE — A9270 NON-COVERED ITEM OR SERVICE: HCPCS | Performed by: NEUROLOGICAL SURGERY

## 2021-11-10 PROCEDURE — 0 LIDOCAINE 1 % SOLUTION: Performed by: NURSE ANESTHETIST, CERTIFIED REGISTERED

## 2021-11-10 PROCEDURE — 63710000001 TRAMADOL 50 MG TABLET: Performed by: NEUROLOGICAL SURGERY

## 2021-11-10 PROCEDURE — 25010000002 DEXAMETHASONE PER 1 MG: Performed by: NURSE ANESTHETIST, CERTIFIED REGISTERED

## 2021-11-10 PROCEDURE — 25010000002 PROPOFOL 10 MG/ML EMULSION: Performed by: NURSE ANESTHETIST, CERTIFIED REGISTERED

## 2021-11-10 PROCEDURE — 63710000001 PREGABALIN 75 MG CAPSULE: Performed by: NEUROLOGICAL SURGERY

## 2021-11-10 PROCEDURE — 63710000001 TEMAZEPAM 15 MG CAPSULE: Performed by: NEUROLOGICAL SURGERY

## 2021-11-10 PROCEDURE — 63710000001 PANTOPRAZOLE 40 MG TABLET DELAYED-RELEASE: Performed by: NEUROLOGICAL SURGERY

## 2021-11-10 PROCEDURE — C1889 IMPLANT/INSERT DEVICE, NOC: HCPCS | Performed by: NEUROLOGICAL SURGERY

## 2021-11-10 PROCEDURE — G0378 HOSPITAL OBSERVATION PER HR: HCPCS

## 2021-11-10 PROCEDURE — 0 CEFAZOLIN IN DEXTROSE 2-4 GM/100ML-% SOLUTION: Performed by: NEUROLOGICAL SURGERY

## 2021-11-10 PROCEDURE — 63710000001 TIZANIDINE 4 MG TABLET: Performed by: NEUROLOGICAL SURGERY

## 2021-11-10 PROCEDURE — 63047 LAM FACETEC & FORAMOT LUMBAR: CPT | Performed by: NEUROLOGICAL SURGERY

## 2021-11-10 PROCEDURE — 76000 FLUOROSCOPY <1 HR PHYS/QHP: CPT

## 2021-11-10 PROCEDURE — 25010000002 NEOSTIGMINE 10 MG/10ML SOLUTION: Performed by: NURSE ANESTHETIST, CERTIFIED REGISTERED

## 2021-11-10 PROCEDURE — 25010000002 ONDANSETRON PER 1 MG: Performed by: NURSE ANESTHETIST, CERTIFIED REGISTERED

## 2021-11-10 PROCEDURE — 25010000002 FENTANYL CITRATE (PF) 50 MCG/ML SOLUTION: Performed by: NURSE ANESTHETIST, CERTIFIED REGISTERED

## 2021-11-10 PROCEDURE — 63710000001 ACETAMINOPHEN 325 MG TABLET: Performed by: NEUROLOGICAL SURGERY

## 2021-11-10 PROCEDURE — 63710000001 CLONIDINE 0.1 MG TABLET: Performed by: NEUROLOGICAL SURGERY

## 2021-11-10 DEVICE — IMPLANTABLE DEVICE
Type: IMPLANTABLE DEVICE | Site: SPINE LUMBAR | Status: FUNCTIONAL
Brand: SURGIFOAM® ABSORBABLE GELATIN SPONGE, U.S.P.

## 2021-11-10 DEVICE — FLOSEAL HEMOSTATIC MATRIX, 10ML
Type: IMPLANTABLE DEVICE | Site: SPINE LUMBAR | Status: FUNCTIONAL
Brand: FLOSEAL HEMOSTATIC MATRIX

## 2021-11-10 RX ORDER — PREGABALIN 75 MG/1
75 CAPSULE ORAL EVERY 12 HOURS SCHEDULED
Status: DISCONTINUED | OUTPATIENT
Start: 2021-11-10 | End: 2021-11-11 | Stop reason: HOSPADM

## 2021-11-10 RX ORDER — ONDANSETRON 2 MG/ML
INJECTION INTRAMUSCULAR; INTRAVENOUS AS NEEDED
Status: DISCONTINUED | OUTPATIENT
Start: 2021-11-10 | End: 2021-11-10 | Stop reason: SURG

## 2021-11-10 RX ORDER — LIDOCAINE HYDROCHLORIDE 10 MG/ML
INJECTION, SOLUTION INFILTRATION; PERINEURAL AS NEEDED
Status: DISCONTINUED | OUTPATIENT
Start: 2021-11-10 | End: 2021-11-10 | Stop reason: SURG

## 2021-11-10 RX ORDER — MIDAZOLAM HYDROCHLORIDE 1 MG/ML
1 INJECTION INTRAMUSCULAR; INTRAVENOUS
Status: DISCONTINUED | OUTPATIENT
Start: 2021-11-10 | End: 2021-11-10 | Stop reason: HOSPADM

## 2021-11-10 RX ORDER — METHOCARBAMOL 750 MG/1
750 TABLET, FILM COATED ORAL 3 TIMES DAILY
Qty: 90 TABLET | Refills: 0 | Status: SHIPPED | OUTPATIENT
Start: 2021-11-10 | End: 2022-03-24

## 2021-11-10 RX ORDER — ROCURONIUM BROMIDE 10 MG/ML
INJECTION, SOLUTION INTRAVENOUS AS NEEDED
Status: DISCONTINUED | OUTPATIENT
Start: 2021-11-10 | End: 2021-11-10 | Stop reason: SURG

## 2021-11-10 RX ORDER — CEFAZOLIN SODIUM 2 G/100ML
2 INJECTION, SOLUTION INTRAVENOUS ONCE
Status: COMPLETED | OUTPATIENT
Start: 2021-11-10 | End: 2021-11-10

## 2021-11-10 RX ORDER — NALOXONE HCL 0.4 MG/ML
0.4 VIAL (ML) INJECTION
Status: DISCONTINUED | OUTPATIENT
Start: 2021-11-10 | End: 2021-11-11 | Stop reason: HOSPADM

## 2021-11-10 RX ORDER — FENTANYL CITRATE 50 UG/ML
50 INJECTION, SOLUTION INTRAMUSCULAR; INTRAVENOUS
Status: DISCONTINUED | OUTPATIENT
Start: 2021-11-10 | End: 2021-11-10 | Stop reason: HOSPADM

## 2021-11-10 RX ORDER — SODIUM CHLORIDE 0.9 % (FLUSH) 0.9 %
3 SYRINGE (ML) INJECTION EVERY 12 HOURS SCHEDULED
Status: DISCONTINUED | OUTPATIENT
Start: 2021-11-10 | End: 2021-11-10 | Stop reason: HOSPADM

## 2021-11-10 RX ORDER — ACETAMINOPHEN 325 MG/1
650 TABLET ORAL EVERY 4 HOURS
Status: DISCONTINUED | OUTPATIENT
Start: 2021-11-10 | End: 2021-11-11 | Stop reason: HOSPADM

## 2021-11-10 RX ORDER — TEMAZEPAM 15 MG/1
15 CAPSULE ORAL NIGHTLY PRN
Status: DISCONTINUED | OUTPATIENT
Start: 2021-11-10 | End: 2021-11-11 | Stop reason: HOSPADM

## 2021-11-10 RX ORDER — FAMOTIDINE 20 MG/1
20 TABLET, FILM COATED ORAL
Status: DISCONTINUED | OUTPATIENT
Start: 2021-11-10 | End: 2021-11-10 | Stop reason: SDUPTHER

## 2021-11-10 RX ORDER — MAGNESIUM HYDROXIDE 1200 MG/15ML
LIQUID ORAL AS NEEDED
Status: DISCONTINUED | OUTPATIENT
Start: 2021-11-10 | End: 2021-11-10 | Stop reason: HOSPADM

## 2021-11-10 RX ORDER — NEOSTIGMINE METHYLSULFATE 1 MG/ML
INJECTION, SOLUTION INTRAVENOUS AS NEEDED
Status: DISCONTINUED | OUTPATIENT
Start: 2021-11-10 | End: 2021-11-10 | Stop reason: SURG

## 2021-11-10 RX ORDER — CLONIDINE HYDROCHLORIDE 0.1 MG/1
0.1 TABLET ORAL EVERY 8 HOURS SCHEDULED
Status: DISCONTINUED | OUTPATIENT
Start: 2021-11-10 | End: 2021-11-11 | Stop reason: HOSPADM

## 2021-11-10 RX ORDER — DULOXETIN HYDROCHLORIDE 60 MG/1
60 CAPSULE, DELAYED RELEASE ORAL DAILY
Status: DISCONTINUED | OUTPATIENT
Start: 2021-11-10 | End: 2021-11-11 | Stop reason: HOSPADM

## 2021-11-10 RX ORDER — LIDOCAINE HYDROCHLORIDE 10 MG/ML
0.5 INJECTION, SOLUTION EPIDURAL; INFILTRATION; INTRACAUDAL; PERINEURAL ONCE AS NEEDED
Status: COMPLETED | OUTPATIENT
Start: 2021-11-10 | End: 2021-11-10

## 2021-11-10 RX ORDER — FAMOTIDINE 20 MG/1
20 TABLET, FILM COATED ORAL ONCE
Status: COMPLETED | OUTPATIENT
Start: 2021-11-10 | End: 2021-11-10

## 2021-11-10 RX ORDER — PROPOFOL 10 MG/ML
VIAL (ML) INTRAVENOUS AS NEEDED
Status: DISCONTINUED | OUTPATIENT
Start: 2021-11-10 | End: 2021-11-10 | Stop reason: SURG

## 2021-11-10 RX ORDER — TRAMADOL HYDROCHLORIDE 50 MG/1
50 TABLET ORAL EVERY 4 HOURS PRN
Status: DISCONTINUED | OUTPATIENT
Start: 2021-11-10 | End: 2021-11-11 | Stop reason: HOSPADM

## 2021-11-10 RX ORDER — FENTANYL CITRATE 50 UG/ML
INJECTION, SOLUTION INTRAMUSCULAR; INTRAVENOUS AS NEEDED
Status: DISCONTINUED | OUTPATIENT
Start: 2021-11-10 | End: 2021-11-10 | Stop reason: SURG

## 2021-11-10 RX ORDER — LIDOCAINE HYDROCHLORIDE AND EPINEPHRINE 5; 5 MG/ML; UG/ML
INJECTION, SOLUTION INFILTRATION; PERINEURAL AS NEEDED
Status: DISCONTINUED | OUTPATIENT
Start: 2021-11-10 | End: 2021-11-10 | Stop reason: HOSPADM

## 2021-11-10 RX ORDER — EPHEDRINE SULFATE 50 MG/ML
INJECTION, SOLUTION INTRAVENOUS AS NEEDED
Status: DISCONTINUED | OUTPATIENT
Start: 2021-11-10 | End: 2021-11-10 | Stop reason: SURG

## 2021-11-10 RX ORDER — DEXAMETHASONE SODIUM PHOSPHATE 4 MG/ML
INJECTION, SOLUTION INTRA-ARTICULAR; INTRALESIONAL; INTRAMUSCULAR; INTRAVENOUS; SOFT TISSUE AS NEEDED
Status: DISCONTINUED | OUTPATIENT
Start: 2021-11-10 | End: 2021-11-10 | Stop reason: SURG

## 2021-11-10 RX ORDER — PROMETHAZINE HYDROCHLORIDE 25 MG/1
25 TABLET ORAL ONCE AS NEEDED
Status: DISCONTINUED | OUTPATIENT
Start: 2021-11-10 | End: 2021-11-10 | Stop reason: HOSPADM

## 2021-11-10 RX ORDER — GLYCOPYRROLATE 0.2 MG/ML
INJECTION INTRAMUSCULAR; INTRAVENOUS AS NEEDED
Status: DISCONTINUED | OUTPATIENT
Start: 2021-11-10 | End: 2021-11-10 | Stop reason: SURG

## 2021-11-10 RX ORDER — SODIUM CHLORIDE 0.9 % (FLUSH) 0.9 %
10 SYRINGE (ML) INJECTION EVERY 12 HOURS SCHEDULED
Status: DISCONTINUED | OUTPATIENT
Start: 2021-11-10 | End: 2021-11-11 | Stop reason: HOSPADM

## 2021-11-10 RX ORDER — SODIUM CHLORIDE, SODIUM LACTATE, POTASSIUM CHLORIDE, CALCIUM CHLORIDE 600; 310; 30; 20 MG/100ML; MG/100ML; MG/100ML; MG/100ML
9 INJECTION, SOLUTION INTRAVENOUS CONTINUOUS
Status: DISCONTINUED | OUTPATIENT
Start: 2021-11-10 | End: 2021-11-11 | Stop reason: HOSPADM

## 2021-11-10 RX ORDER — LATANOPROST 50 UG/ML
1 SOLUTION/ DROPS OPHTHALMIC NIGHTLY
Status: DISCONTINUED | OUTPATIENT
Start: 2021-11-10 | End: 2021-11-11 | Stop reason: HOSPADM

## 2021-11-10 RX ORDER — TRAMADOL HYDROCHLORIDE 50 MG/1
50 TABLET ORAL EVERY 6 HOURS PRN
Status: DISCONTINUED | OUTPATIENT
Start: 2021-11-10 | End: 2021-11-11 | Stop reason: SDUPTHER

## 2021-11-10 RX ORDER — TIZANIDINE 4 MG/1
12 TABLET ORAL NIGHTLY PRN
Status: DISCONTINUED | OUTPATIENT
Start: 2021-11-10 | End: 2021-11-11 | Stop reason: HOSPADM

## 2021-11-10 RX ORDER — PANTOPRAZOLE SODIUM 40 MG/1
40 TABLET, DELAYED RELEASE ORAL NIGHTLY
Status: DISCONTINUED | OUTPATIENT
Start: 2021-11-10 | End: 2021-11-11 | Stop reason: HOSPADM

## 2021-11-10 RX ORDER — SODIUM CHLORIDE 9 MG/ML
INJECTION, SOLUTION INTRAVENOUS CONTINUOUS PRN
Status: DISCONTINUED | OUTPATIENT
Start: 2021-11-10 | End: 2021-11-10 | Stop reason: SURG

## 2021-11-10 RX ORDER — HYDROCODONE BITARTRATE AND ACETAMINOPHEN 7.5; 325 MG/1; MG/1
1 TABLET ORAL ONCE
Status: COMPLETED | OUTPATIENT
Start: 2021-11-10 | End: 2021-11-10

## 2021-11-10 RX ORDER — SODIUM CHLORIDE 9 MG/ML
9 INJECTION, SOLUTION INTRAVENOUS ONCE
Status: COMPLETED | OUTPATIENT
Start: 2021-11-10 | End: 2021-11-10

## 2021-11-10 RX ORDER — SODIUM CHLORIDE 0.9 % (FLUSH) 0.9 %
10 SYRINGE (ML) INJECTION EVERY 12 HOURS SCHEDULED
Status: DISCONTINUED | OUTPATIENT
Start: 2021-11-10 | End: 2021-11-10 | Stop reason: HOSPADM

## 2021-11-10 RX ORDER — CELECOXIB 200 MG/1
200 CAPSULE ORAL DAILY
Status: DISCONTINUED | OUTPATIENT
Start: 2021-11-11 | End: 2021-11-11 | Stop reason: HOSPADM

## 2021-11-10 RX ORDER — IBUPROFEN 800 MG/1
800 TABLET ORAL EVERY 6 HOURS SCHEDULED
Status: DISCONTINUED | OUTPATIENT
Start: 2021-11-10 | End: 2021-11-10 | Stop reason: SDUPTHER

## 2021-11-10 RX ORDER — HYDROCODONE BITARTRATE AND ACETAMINOPHEN 7.5; 325 MG/1; MG/1
1 TABLET ORAL EVERY 6 HOURS PRN
Qty: 30 TABLET | Refills: 0 | Status: SHIPPED | OUTPATIENT
Start: 2021-11-10

## 2021-11-10 RX ORDER — SODIUM CHLORIDE 0.9 % (FLUSH) 0.9 %
10 SYRINGE (ML) INJECTION AS NEEDED
Status: DISCONTINUED | OUTPATIENT
Start: 2021-11-10 | End: 2021-11-10 | Stop reason: HOSPADM

## 2021-11-10 RX ORDER — HYDROCODONE BITARTRATE AND ACETAMINOPHEN 5; 325 MG/1; MG/1
1 TABLET ORAL EVERY 6 HOURS PRN
Status: DISCONTINUED | OUTPATIENT
Start: 2021-11-10 | End: 2021-11-11 | Stop reason: HOSPADM

## 2021-11-10 RX ORDER — IBUPROFEN 800 MG/1
800 TABLET ORAL ONCE
Status: COMPLETED | OUTPATIENT
Start: 2021-11-10 | End: 2021-11-10

## 2021-11-10 RX ORDER — SODIUM CHLORIDE 0.9 % (FLUSH) 0.9 %
3-10 SYRINGE (ML) INJECTION AS NEEDED
Status: DISCONTINUED | OUTPATIENT
Start: 2021-11-10 | End: 2021-11-10 | Stop reason: HOSPADM

## 2021-11-10 RX ORDER — ACETAMINOPHEN 325 MG/1
650 TABLET ORAL ONCE
Status: COMPLETED | OUTPATIENT
Start: 2021-11-10 | End: 2021-11-10

## 2021-11-10 RX ORDER — SODIUM CHLORIDE 0.9 % (FLUSH) 0.9 %
10 SYRINGE (ML) INJECTION AS NEEDED
Status: DISCONTINUED | OUTPATIENT
Start: 2021-11-10 | End: 2021-11-11 | Stop reason: HOSPADM

## 2021-11-10 RX ORDER — PROMETHAZINE HYDROCHLORIDE 25 MG/1
25 SUPPOSITORY RECTAL ONCE AS NEEDED
Status: DISCONTINUED | OUTPATIENT
Start: 2021-11-10 | End: 2021-11-10 | Stop reason: HOSPADM

## 2021-11-10 RX ADMIN — LIDOCAINE HYDROCHLORIDE 0.5 ML: 10 INJECTION, SOLUTION EPIDURAL; INFILTRATION; INTRACAUDAL; PERINEURAL at 08:05

## 2021-11-10 RX ADMIN — SODIUM CHLORIDE: 9 INJECTION, SOLUTION INTRAVENOUS at 10:12

## 2021-11-10 RX ADMIN — SODIUM CHLORIDE 9 ML/HR: 9 INJECTION, SOLUTION INTRAVENOUS at 08:05

## 2021-11-10 RX ADMIN — TEMAZEPAM 15 MG: 15 CAPSULE ORAL at 23:36

## 2021-11-10 RX ADMIN — GLYCOPYRROLATE 0.4 MG: 0.2 INJECTION INTRAMUSCULAR; INTRAVENOUS at 12:01

## 2021-11-10 RX ADMIN — ONDANSETRON 4 MG: 2 INJECTION INTRAMUSCULAR; INTRAVENOUS at 11:50

## 2021-11-10 RX ADMIN — PANTOPRAZOLE SODIUM 40 MG: 40 TABLET, DELAYED RELEASE ORAL at 20:25

## 2021-11-10 RX ADMIN — SODIUM CHLORIDE: 9 INJECTION, SOLUTION INTRAVENOUS at 11:55

## 2021-11-10 RX ADMIN — FAMOTIDINE 20 MG: 20 TABLET ORAL at 08:10

## 2021-11-10 RX ADMIN — PROPOFOL 200 MG: 10 INJECTION, EMULSION INTRAVENOUS at 10:16

## 2021-11-10 RX ADMIN — LATANOPROST 1 DROP: 50 SOLUTION OPHTHALMIC at 20:37

## 2021-11-10 RX ADMIN — TIZANIDINE 12 MG: 4 TABLET ORAL at 20:37

## 2021-11-10 RX ADMIN — NEOSTIGMINE METHYLSULFATE 3 MG: 0.5 INJECTION INTRAVENOUS at 12:01

## 2021-11-10 RX ADMIN — ACETAMINOPHEN 650 MG: 325 TABLET, FILM COATED ORAL at 20:25

## 2021-11-10 RX ADMIN — IBUPROFEN 800 MG: 800 TABLET, FILM COATED ORAL at 08:10

## 2021-11-10 RX ADMIN — DEXAMETHASONE SODIUM PHOSPHATE 8 MG: 4 INJECTION, SOLUTION INTRA-ARTICULAR; INTRALESIONAL; INTRAMUSCULAR; INTRAVENOUS; SOFT TISSUE at 10:12

## 2021-11-10 RX ADMIN — CEFAZOLIN SODIUM 2 G: 2 INJECTION, SOLUTION INTRAVENOUS at 10:12

## 2021-11-10 RX ADMIN — CLONIDINE HYDROCHLORIDE 0.1 MG: 0.1 TABLET ORAL at 15:32

## 2021-11-10 RX ADMIN — TRAMADOL HYDROCHLORIDE 50 MG: 50 TABLET, COATED ORAL at 23:36

## 2021-11-10 RX ADMIN — ACETAMINOPHEN 650 MG: 325 TABLET, FILM COATED ORAL at 23:36

## 2021-11-10 RX ADMIN — FENTANYL CITRATE 100 MCG: 50 INJECTION, SOLUTION INTRAMUSCULAR; INTRAVENOUS at 10:12

## 2021-11-10 RX ADMIN — EPHEDRINE SULFATE 10 MG: 50 INJECTION INTRAVENOUS at 11:07

## 2021-11-10 RX ADMIN — HYDROCODONE BITARTRATE AND ACETAMINOPHEN 1 TABLET: 7.5; 325 TABLET ORAL at 08:14

## 2021-11-10 RX ADMIN — LIDOCAINE HYDROCHLORIDE 50 MG: 10 INJECTION, SOLUTION INFILTRATION; PERINEURAL at 10:16

## 2021-11-10 RX ADMIN — PREGABALIN 75 MG: 75 CAPSULE ORAL at 20:25

## 2021-11-10 RX ADMIN — ACETAMINOPHEN 650 MG: 325 TABLET, FILM COATED ORAL at 15:32

## 2021-11-10 RX ADMIN — CLONIDINE HYDROCHLORIDE 0.1 MG: 0.1 TABLET ORAL at 20:25

## 2021-11-10 RX ADMIN — ROCURONIUM BROMIDE 50 MG: 10 INJECTION, SOLUTION INTRAVENOUS at 10:16

## 2021-11-10 RX ADMIN — ACETAMINOPHEN 650 MG: 325 TABLET ORAL at 08:10

## 2021-11-10 NOTE — OP NOTE
Operation note      Preoperative diagnosis spinal stenosis central facet arthropathy      Postoperative diagnosis same    Procedures performed left-sided approach minimally invasive laminectomy/bilateral L4-5 lamina foraminotomies was performed from a left-sided approach      Surgeon: Jordon Villarreal MD     Assistants: Jet Bonilla my physician's assistant was present and personally scrubbed the entirety of the procedure, they assisted suctioning, retraction, approach, and closure of the case    Anesthesia: Normal endotracheal anesthesia    ASA Class: 2    Findings hard disc calcification with  cartilaginous disc   Complications none    20 cc blood loss microscope used    Procedure in detail after formal written consent was obtained the patient was taken to the operating room endotracheally intubated given preoperative antimicrobial prophylaxis they were prepped and draped in the usual sterile manner all bony prominences and genitalia were padded to prevent neurologic injury.    At this point in time the patient was given local anesthesia at the operative level fluoroscopic guidance confirmed as 45 the correct level and tubular dilation system was placed on the lamina facet complex to ensure adequate exposure.  A block was placed for anesthesia    At this point time the microscope was used to visualize the exposure a hemilaminotomy and partial medial facetectomy was performed on the ipsilateral side, the yellow ligament was then identified and removed the traversing nerve root was identified and the disc space was identified as well by gentle mobilization of the nerve root.  The foraminotomy was generous in order to decompress the ipsilateral L4-5 nerve root    Attention was then turned to the contralateral side across the dorsal portion of the dura where thickened ligament was resected down and a contralateral right-sided foraminotomies to 4 5 area was performed, synovium was resected and coagulated back until  the nerve was clear.      At the resolution the case meticulous hemostasis was maintained and in the incision was closed in layers I was present personally performed the entirety of the procedure until the skin closure.  Some Depo-Medrol was placed over the dorsal nerve root

## 2021-11-10 NOTE — H&P
Pre-Op H&P  Nikole Beltre  [id number]  1955    Chief complaint: Low back pain, BLE weakness    HPI:    Patient is a 66 y.o.female who presents with a history of low back pain and lower extremity weakness. MRI of her lumbar spine demonstrates progression of her spinal stenosis L4-L5 and L2-L3. Conservative treatment has failed to provide significant relief. Surgical intervention is recommended and she is agreeable. She is here today for a lumbar laminectomy L4-L5.    Review of Systems:  General ROS: negative for chills, fever or skin lesions;  No changes since last office visit.  Neg for recent sick exposure  Cardiovascular ROS: no chest pain or dyspnea on exertion; +HTN, +dyslipidemia  Respiratory ROS: no cough, shortness of breath, or wheeze  Neuro ROS: history of seizures as a child, +CP    Allergies:   Allergies   Allergen Reactions   • Percocet [Oxycodone-Acetaminophen] Hives   • Lanolin Rash       Home Meds:    No current facility-administered medications on file prior to encounter.     Current Outpatient Medications on File Prior to Encounter   Medication Sig Dispense Refill   • celecoxib (CeleBREX) 200 MG capsule Take 200 mg by mouth Daily.  11   • chlorhexidine (HIBICLENS) 4 % external liquid Shower each day with solution for 5 days beginning 5 days before surgery. (Patient taking differently: Apply 1 application topically to the appropriate area as directed Daily. Shower each day with solution for 5 days beginning 5 days before surgery.) 120 mL 0   • CloNIDine (CATAPRES) 0.1 MG tablet Take 0.1 mg by mouth every night at bedtime.  0   • colesevelam (WELCHOL) 625 MG tablet Take 1,875 mg by mouth As Needed (For IBS symptoms).     • DULoxetine (CYMBALTA) 60 MG capsule Take 60 mg by mouth Daily.     • latanoprost (XALATAN) 0.005 % ophthalmic solution Administer 1 drop to both eyes Every Night.  11   • pantoprazole (PROTONIX) 40 MG EC tablet Take 40 mg by mouth Every Night.     • pregabalin (LYRICA) 150 MG  "capsule Take 150 mg by mouth every night at bedtime.     • ROPINIROLE HCL PO Take 1.5 mg by mouth Every Night.     • tiZANidine (ZANAFLEX) 4 MG tablet Take 12 mg by mouth At Night As Needed for Muscle Spasms.     • traMADol (ULTRAM) 50 MG tablet Take 50 mg by mouth Every 6 (Six) Hours As Needed for Moderate Pain .         PMH:   Past Medical History:   Diagnosis Date   • Anxiety and depression    • Arthritis    • Back problem    • Bowel incontinence     at night    • Cerebral palsy (HCC)    • Diverticulosis    • Eczema    • Esophageal spasm    • Glaucoma suspect    • Hearing loss    • History of seizure     as a child   • History of use of hearing aid in right ear    • Hypertension    • IBS (irritable bowel syndrome)    • Mild hearing loss    • PONV (postoperative nausea and vomiting)    • Pre-diabetes    • Wears glasses      PSH:    Past Surgical History:   Procedure Laterality Date   • ACHILLES TENDON LENGTHENING      on both heels   • ANTERIOR CERVICAL DISCECTOMY W/ FUSION N/A 1/23/2019    Procedure: CERVICAL CORPECTOMY C5-C6 ACDF C4-7;  Surgeon: Jordon Villarreal MD;  Location: [facility];  Service: Neurosurgery   • BUNIONECTOMY Bilateral    • CHOLECYSTECTOMY     • COLONOSCOPY     • CYST REMOVAL     • DILATATION AND CURETTAGE      x2   • ENDOSCOPY         Social History:   Tobacco:   Social History     Tobacco Use   Smoking Status Never Smoker   Smokeless Tobacco Never Used      Alcohol:     Social History     Substance and Sexual Activity   Alcohol Use No       Vitals:           /85 (BP Location: Right arm, Patient Position: Lying)   Pulse 84   Temp 97.4 °F (36.3 °C) (Temporal)   Resp 17   Ht 162.6 cm (64\")   Wt 104 kg (230 lb)   SpO2 97%   BMI 39.48 kg/m²     Physical Exam:  General Appearance:    Alert, cooperative, no distress, appears stated age   Head:    Normocephalic, without obvious abnormality, atraumatic   Lungs:     Clear to auscultation bilaterally, respirations unlabored    Heart:   " Regular rate and rhythm, S1 and S2 normal, no murmur, rub    or gallop    Abdomen:    Soft, non-tender, +bowel sounds   Breast Exam:    deferred   Genitalia:    deferred   Extremities:   Extremities normal, atraumatic, no cyanosis or edema   Skin:   Skin color, texture, turgor normal, no rashes or lesions   Neurologic:   Grossly intact   Results Review  LABS:  Lab Results   Component Value Date    WBC 7.13 11/08/2021    HGB 16.3 (H) 11/08/2021    HCT 51.8 (H) 11/08/2021    MCV 90.4 11/08/2021     11/08/2021    NEUTROABS 3.93 01/29/2019    GLUCOSE 99 11/08/2021    BUN 26 (H) 11/08/2021    CREATININE 0.67 11/08/2021    EGFRIFNONA 89 11/08/2021     11/08/2021    K 4.3 11/08/2021     11/08/2021    CO2 25.0 11/08/2021    MG 2.0 01/30/2019    CALCIUM 9.4 11/08/2021    INR 0.96 01/16/2019       I reviewed the patient's new clinical results.    Cancer Staging (if applicable)  Cancer Patient: __ yes _X_no __unknown; If yes, clinical stage T:__ N:__M:__, stage group or __N/A    Impression: Lumbar stenosis L4-L5 with neurogenic claudication    Plan: Lumbar laminectomy L4-L5      Allyson Wen, CAT   11/10/21   8:11 AM EST

## 2021-11-10 NOTE — ANESTHESIA PROCEDURE NOTES
Airway  Urgency: elective    Date/Time: 11/10/2021 10:19 AM  Airway not difficult    General Information and Staff    Patient location during procedure: OR  CRNA: Ania Barrientos CRNA    Indications and Patient Condition  Indications for airway management: airway protection    Preoxygenated: yes  MILS maintained throughout  Mask difficulty assessment: 1 - vent by mask    Final Airway Details  Final airway type: endotracheal airway      Successful airway: ETT  Cuffed: yes   Successful intubation technique: video laryngoscopy  Endotracheal tube insertion site: oral  Blade: Linda  Blade size: X blade.  ETT size (mm): 7.0  Cormack-Lehane Classification: grade I - full view of glottis  Placement verified by: chest auscultation and capnometry   Measured from: lips  ETT/EBT  to lips (cm): 20  Number of attempts at approach: 1  Assessment: lips, teeth, and gum same as pre-op and atraumatic intubation    Additional Comments  Negative epigastric sounds, Breath sound equal bilaterally with symmetric chest rise and fall  Elective Pereyra, easy atraumatic

## 2021-11-10 NOTE — ANESTHESIA POSTPROCEDURE EVALUATION
Patient: Nikole Beltre    Procedure Summary     Date: 11/10/21 [facility] / [facility]:  [facility]    Anesthesia Start: 1012 Anesthesia Stop:     Procedure: LUMBAR LAMINECTOMY L4-5 (N/A Spine Lumbar) Diagnosis:       Lumbar stenosis with neurogenic claudication      (Lumbar stenosis with neurogenic claudication [M48.062])    Surgeons: Jordon Villarreal MD Provider: Jesus Soler MD    Anesthesia Type: general ASA Status: 3          Anesthesia Type: general    Vitals  No vitals data found for the desired time range.          Post Anesthesia Care and Evaluation    Patient location during evaluation: PACU  Patient participation: complete - patient participated  Level of consciousness: awake and alert  Pain management: adequate  Airway patency: patent  Anesthetic complications: No anesthetic complications  PONV Status: none  Cardiovascular status: hemodynamically stable and acceptable  Respiratory status: nonlabored ventilation, acceptable and nasal cannula  Hydration status: acceptable

## 2021-11-10 NOTE — PLAN OF CARE
Problem: Adult Inpatient Plan of Care  Goal: Plan of Care Review  Outcome: Ongoing, Progressing  Flowsheets (Taken 11/10/2021 1758)  Progress: no change  Plan of Care Reviewed With: patient  Outcome Summary: Patient arrived from PACU. VSS, A&Ox4, 2L NC. No c/o pain. Up w/ 2 assist to BSC, GB and walker. Voiding spontaneously. Coverderm to back intact with scant amt of drainage. Denies n/t. BLE edema/weakness (hx of cerebral palsy). PT to woek w/ PT tmr. Continue to monitor.  Goal: Patient-Specific Goal (Individualized)  Outcome: Ongoing, Progressing  Goal: Absence of Hospital-Acquired Illness or Injury  Outcome: Ongoing, Progressing  Intervention: Identify and Manage Fall Risk  Recent Flowsheet Documentation  Taken 11/10/2021 1643 by Marce Betancourt RN  Safety Promotion/Fall Prevention:   activity supervised   assistive device/personal items within reach   clutter free environment maintained   gait belt   fall prevention program maintained   toileting scheduled   safety round/check completed   room organization consistent   nonskid shoes/slippers when out of bed  Taken 11/10/2021 1450 by Marce Betancourt, RN  Safety Promotion/Fall Prevention:   activity supervised   assistive device/personal items within reach   clutter free environment maintained   gait belt   fall prevention program maintained   toileting scheduled   safety round/check completed   room organization consistent   nonskid shoes/slippers when out of bed  Intervention: Prevent Skin Injury  Recent Flowsheet Documentation  Taken 11/10/2021 1643 by Marce Betancourt, RN  Body Position: sitting up in bed  Taken 11/10/2021 1450 by Marce Betancourt, RN  Body Position: sitting up in bed  Intervention: Prevent and Manage VTE (venous thromboembolism) Risk  Recent Flowsheet Documentation  Taken 11/10/2021 1643 by Marce Betancourt RN  VTE Prevention/Management:   bilateral   sequential compression devices on  Taken 11/10/2021 1450 by Marce Betancourt RN  VTE  Prevention/Management:   bilateral   sequential compression devices on  Intervention: Prevent Infection  Recent Flowsheet Documentation  Taken 11/10/2021 1643 by Marce Betancourt RN  Infection Prevention: environmental surveillance performed  Taken 11/10/2021 1450 by Marce Betancourt RN  Infection Prevention: environmental surveillance performed  Goal: Optimal Comfort and Wellbeing  Outcome: Ongoing, Progressing  Intervention: Provide Person-Centered Care  Recent Flowsheet Documentation  Taken 11/10/2021 1643 by Marce Betancourt RN  Trust Relationship/Rapport: care explained  Taken 11/10/2021 1450 by Marce Betancourt RN  Trust Relationship/Rapport:   care explained   choices provided   questions encouraged   questions answered   reassurance provided   thoughts/feelings acknowledged  Goal: Readiness for Transition of Care  Outcome: Ongoing, Progressing  Intervention: Mutually Develop Transition Plan  Recent Flowsheet Documentation  Taken 11/10/2021 1534 by Marce Betancourt RN  Equipment Currently Used at Home: walker, standard  Transportation Anticipated: family or friend will provide  Patient/Family Anticipated Services at Transition: none  Patient/Family Anticipates Transition to:   home with help/services   home with family     Problem: Hypertension Comorbidity  Goal: Blood Pressure in Desired Range  Outcome: Ongoing, Progressing  Intervention: Maintain Hypertension-Management Strategies  Recent Flowsheet Documentation  Taken 11/10/2021 1450 by Marce Betancourt, RN  Medication Review/Management: medications reviewed     Problem: Fall Injury Risk  Goal: Absence of Fall and Fall-Related Injury  Outcome: Ongoing, Progressing  Intervention: Identify and Manage Contributors to Fall Injury Risk  Recent Flowsheet Documentation  Taken 11/10/2021 1450 by Marce Betancourt, RN  Medication Review/Management: medications reviewed  Intervention: Promote Injury-Free Environment  Recent Flowsheet Documentation  Taken 11/10/2021  1643 by Marce Betancourt RN  Safety Promotion/Fall Prevention:   activity supervised   assistive device/personal items within reach   clutter free environment maintained   gait belt   fall prevention program maintained   toileting scheduled   safety round/check completed   room organization consistent   nonskid shoes/slippers when out of bed  Taken 11/10/2021 1450 by Marce Betancourt, RN  Safety Promotion/Fall Prevention:   activity supervised   assistive device/personal items within reach   clutter free environment maintained   gait belt   fall prevention program maintained   toileting scheduled   safety round/check completed   room organization consistent   nonskid shoes/slippers when out of bed     Problem: Bleeding (Spinal Surgery)  Goal: Absence of Bleeding  Outcome: Ongoing, Progressing     Problem: Bowel Elimination Impaired (Spinal Surgery)  Goal: Effective Bowel Elimination  Outcome: Ongoing, Progressing     Problem: Functional Ability Impaired (Spinal Surgery)  Goal: Optimal Functional Ability  Outcome: Ongoing, Progressing  Intervention: Optimize Functional Status  Recent Flowsheet Documentation  Taken 11/10/2021 1643 by Marce Betancourt, RN  Activity Management: up to bedside commode  Positioning/Transfer Devices:   pillows   in use  Taken 11/10/2021 1450 by Marce Betancourt RN  Activity Management: (stretcher to bed) ambulated in room  Positioning/Transfer Devices:   pillows   in use     Problem: Infection (Spinal Surgery)  Goal: Absence of Infection Signs and Symptoms  Outcome: Ongoing, Progressing     Problem: Neurologic Impairment (Spinal Surgery)  Goal: Optimal Neurologic Function  Outcome: Ongoing, Progressing  Intervention: Optimize Neurologic Function  Recent Flowsheet Documentation  Taken 11/10/2021 1643 by Marce Betancourt, RN  Body Position: sitting up in bed  Taken 11/10/2021 1450 by Marce Betancourt RN  Body Position: sitting up in bed     Problem: Ongoing Anesthesia Effects (Spinal  Surgery)  Goal: Anesthesia/Sedation Recovery  Outcome: Ongoing, Progressing  Intervention: Optimize Anesthesia Recovery  Recent Flowsheet Documentation  Taken 11/10/2021 1643 by Marce Betancourt RN  Safety Promotion/Fall Prevention:   activity supervised   assistive device/personal items within reach   clutter free environment maintained   gait belt   fall prevention program maintained   toileting scheduled   safety round/check completed   room organization consistent   nonskid shoes/slippers when out of bed  Taken 11/10/2021 1450 by Marce Betancourt, RN  Safety Promotion/Fall Prevention:   activity supervised   assistive device/personal items within reach   clutter free environment maintained   gait belt   fall prevention program maintained   toileting scheduled   safety round/check completed   room organization consistent   nonskid shoes/slippers when out of bed     Problem: Pain (Spinal Surgery)  Goal: Acceptable Pain Control  Outcome: Ongoing, Progressing     Problem: Postoperative Nausea and Vomiting (Spinal Surgery)  Goal: Nausea and Vomiting Relief  Outcome: Ongoing, Progressing     Problem: Postoperative Urinary Retention (Spinal Surgery)  Goal: Effective Urinary Elimination  Outcome: Ongoing, Progressing  Intervention: Monitor and Manage Urinary Retention  Recent Flowsheet Documentation  Taken 11/10/2021 1643 by Marce Betancourt RN  Urinary Elimination Promotion: toileting offered   Goal Outcome Evaluation:  Plan of Care Reviewed With: patient        Progress: no change  Outcome Summary: Patient arrived from PACU. VSS, A&Ox4, 2L NC. No c/o pain. Up w/ 2 assist to BSC, GB and walker. Voiding spontaneously. Coverderm to back intact with scant amt of drainage. Denies n/t. BLE edema/weakness (hx of cerebral palsy). PT to woek w/ PT tmr. Continue to monitor.

## 2021-11-10 NOTE — ANESTHESIA PREPROCEDURE EVALUATION
Anesthesia Evaluation     Patient summary reviewed and Nursing notes reviewed   history of anesthetic complications: PONV  NPO Solid Status: > 8 hours  NPO Liquid Status: > 2 hours           Airway   Mallampati: II  TM distance: >3 FB  Neck ROM: limited  Possible difficult intubation  Comment: Previous grade 1 view with MAC 3 blade for ACDF  Dental - normal exam     Pulmonary - negative pulmonary ROS and normal exam   Cardiovascular - normal exam    ECG reviewed    (+) hypertension,       Neuro/Psych  (+) seizures (as a child), neuromuscular disease (Cerbral palsy), psychiatric history Anxiety and Depression,       ROS Comment: Lower extremity weakness  GI/Hepatic/Renal/Endo    (+)  GERD well controlled,    (-) hepatitis, liver disease, no renal disease, diabetes, no thyroid disorder    Musculoskeletal     (+) back pain, neck pain (h/o fusion/corpectomy 2019),   Abdominal    Substance History      OB/GYN          Other   arthritis,                      Anesthesia Plan    ASA 3     general   (Possible Winn/Glidescope)  intravenous induction     Anesthetic plan, all risks, benefits, and alternatives have been provided, discussed and informed consent has been obtained with: patient.    Plan discussed with CRNA.

## 2021-11-11 VITALS
OXYGEN SATURATION: 95 % | BODY MASS INDEX: 39.27 KG/M2 | HEIGHT: 64 IN | RESPIRATION RATE: 18 BRPM | SYSTOLIC BLOOD PRESSURE: 147 MMHG | TEMPERATURE: 97.8 F | WEIGHT: 230 LBS | DIASTOLIC BLOOD PRESSURE: 67 MMHG | HEART RATE: 76 BPM

## 2021-11-11 PROCEDURE — A9270 NON-COVERED ITEM OR SERVICE: HCPCS | Performed by: NEUROLOGICAL SURGERY

## 2021-11-11 PROCEDURE — 63710000001 PREGABALIN 75 MG CAPSULE: Performed by: NEUROLOGICAL SURGERY

## 2021-11-11 PROCEDURE — 99024 POSTOP FOLLOW-UP VISIT: CPT | Performed by: PHYSICIAN ASSISTANT

## 2021-11-11 PROCEDURE — 63710000001 TRAMADOL 50 MG TABLET: Performed by: NEUROLOGICAL SURGERY

## 2021-11-11 PROCEDURE — 63710000001 DULOXETINE 60 MG CAPSULE DELAYED-RELEASE PARTICLES: Performed by: NEUROLOGICAL SURGERY

## 2021-11-11 PROCEDURE — 97166 OT EVAL MOD COMPLEX 45 MIN: CPT

## 2021-11-11 PROCEDURE — 97162 PT EVAL MOD COMPLEX 30 MIN: CPT

## 2021-11-11 PROCEDURE — 97535 SELF CARE MNGMENT TRAINING: CPT

## 2021-11-11 PROCEDURE — 63710000001 ACETAMINOPHEN 325 MG TABLET: Performed by: NEUROLOGICAL SURGERY

## 2021-11-11 PROCEDURE — G0378 HOSPITAL OBSERVATION PER HR: HCPCS

## 2021-11-11 PROCEDURE — 63710000001 CELECOXIB 200 MG CAPSULE: Performed by: NEUROLOGICAL SURGERY

## 2021-11-11 PROCEDURE — 63710000001 CLONIDINE 0.1 MG TABLET: Performed by: NEUROLOGICAL SURGERY

## 2021-11-11 RX ADMIN — ACETAMINOPHEN 650 MG: 325 TABLET, FILM COATED ORAL at 08:47

## 2021-11-11 RX ADMIN — ACETAMINOPHEN 650 MG: 325 TABLET, FILM COATED ORAL at 04:12

## 2021-11-11 RX ADMIN — PREGABALIN 75 MG: 75 CAPSULE ORAL at 08:46

## 2021-11-11 RX ADMIN — CLONIDINE HYDROCHLORIDE 0.1 MG: 0.1 TABLET ORAL at 06:16

## 2021-11-11 RX ADMIN — TRAMADOL HYDROCHLORIDE 50 MG: 50 TABLET, COATED ORAL at 08:46

## 2021-11-11 RX ADMIN — ACETAMINOPHEN 650 MG: 325 TABLET, FILM COATED ORAL at 11:48

## 2021-11-11 RX ADMIN — DULOXETINE HYDROCHLORIDE 60 MG: 60 CAPSULE, DELAYED RELEASE ORAL at 08:46

## 2021-11-11 RX ADMIN — CELECOXIB 200 MG: 200 CAPSULE ORAL at 08:47

## 2021-11-11 NOTE — THERAPY EVALUATION
Patient Name: Nikole Beltre  : 1955    MRN: [id number]                              Today's Date: 2021       Admit Date: 11/10/2021    Visit Dx:     ICD-10-CM ICD-9-CM   1. Spinal stenosis of lumbar region without neurogenic claudication  M48.061 724.02   2. L4-5 Lumbar spinal stenosis with neurogenic claudication  M48.062 724.03     Patient Active Problem List   Diagnosis   • Cerebral palsy (HCC)   • L4-5 Lumbar spinal stenosis with neurogenic claudication   • Spondylosis of lumbar region without myelopathy or radiculopathy   • DDD (degenerative disc disease), lumbar   • Hypertrophy of ligamentum flavum   • Greater trochanteric bursitis of left hip   • At high risk for falls   • Abnormality of gait due to impairment of balance   • Cervical myelopathy with falls, weakness, incontinence (CMS/HCC)   • Myelopathy (HCC)   • S/P anterior cervical diskectomy of C4-5, 5-6, 6-7 as well as corpectomy 19   • Stenosis, spinal, lumbar     Past Medical History:   Diagnosis Date   • Anxiety and depression    • Arthritis    • Back problem    • Bowel incontinence     at night    • Cerebral palsy (HCC)    • Diverticulosis    • Eczema    • Esophageal spasm    • Glaucoma suspect    • Hearing loss    • History of seizure     as a child   • History of use of hearing aid in right ear    • Hypertension    • IBS (irritable bowel syndrome)    • Mild hearing loss    • PONV (postoperative nausea and vomiting)    • Pre-diabetes    • Wears glasses      Past Surgical History:   Procedure Laterality Date   • ACHILLES TENDON LENGTHENING      on both heels   • ANTERIOR CERVICAL DISCECTOMY W/ FUSION N/A 2019    Procedure: CERVICAL CORPECTOMY C5-C6 ACDF C4-7;  Surgeon: Jordon Villarreal MD;  Location: [facility];  Service: Neurosurgery   • BUNIONECTOMY Bilateral    • CHOLECYSTECTOMY     • COLONOSCOPY     • CYST REMOVAL     • DILATATION AND CURETTAGE      x2   • ENDOSCOPY     • LUMBAR LAMINECTOMY DISCECTOMY DECOMPRESSION N/A  11/10/2021    Procedure: LUMBAR LAMINECTOMY L4-5;  Surgeon: Jordon Villarreal MD;  Location: [facility];  Service: Neurosurgery;  Laterality: N/A;      General Information     Row Name 11/11/21 1045          Physical Therapy Time and Intention    Document Type evaluation  -MB     Mode of Treatment physical therapy  -MB     Row Name 11/11/21 1045          General Information    Patient Profile Reviewed yes  -MB     Prior Level of Function mod assist:; bed mobility; transfer; w/c or scooter; ADL's; max assist:; all household mobility; community mobility  Pt. reports she was ambulating household distances ~ 1 year ago, prior to onset spinal pain/dysfunction/sx. and family illness inhibiting activity.   very supportive.  -MB     Existing Precautions/Restrictions fall; spinal  PMH: CP, cervical myelopathy  -MB     Barriers to Rehab medically complex; previous functional deficit  -MB     Row Name 11/11/21 1045          Living Environment    Lives With spouse  -[facility] Name 11/11/21 1045          Home Main Entrance    Number of Stairs, Main Entrance none  -MB     Row Name 11/11/21 1045          Cognition    Orientation Status (Cognition) oriented x 4  -MB     Row Name 11/11/21 1045          Safety Issues, Functional Mobility    Safety Issues Affecting Function (Mobility) sequencing abilities  -MB     Impairments Affecting Function (Mobility) balance; endurance/activity tolerance; pain; strength; coordination  -MB           User Key  (r) = Recorded By, (t) = Taken By, (c) = Cosigned By    Initials Name Provider Type    MB Cleopatra Birmingham, PT Physical Therapist               Mobility     Row Name 11/11/21 1045          Bed Mobility    Comment (Bed Mobility) Pt. received sitting EOB.  Reviewed spinal precautions and issued illustrated handout.  -MB     Row Name 11/11/21 1045          Transfers    Comment (Transfers) STS x2 w/ VCs for safe hand placement and sequencing.  -MB     Row Name 11/11/21 1045           Sit-Stand Transfer    Sit-Stand Donley (Transfers) moderate assist (50% patient effort); 2 person assist; verbal cues  -MB     Assistive Device (Sit-Stand Transfers) walker, front-wheeled  -MB     Row Name 11/11/21 1045          Gait/Stairs (Locomotion)    Donley Level (Gait) minimum assist (75% patient effort); 1 person to manage equipment; verbal cues  -MB     Assistive Device (Gait) walker, front-wheeled  -MB     Distance in Feet (Gait) 15  -MB     Deviations/Abnormal Patterns (Gait) chris decreased; bilateral deviations; ataxic; gait speed decreased; stride length decreased; weight shifting decreased  -MB     Bilateral Gait Deviations forward flexed posture; heel strike decreased  -MB     Comment (Gait/Stairs) Pt. ambulated w/ step through CP gait pattern w/ slow pace; followed closely w/ chair.  VCs to keep RW close to RETA.  -MB           User Key  (r) = Recorded By, (t) = Taken By, (c) = Cosigned By    Initials Name Provider Type    MB Cleopatra Birmingham, PT Physical Therapist               Obj/Interventions     Row Name 11/11/21 1045          Range of Motion Comprehensive    General Range of Motion lower extremity range of motion deficits identified  -MB     Comment, General Range of Motion Impaired B hip ABD/ER, knee ext (CP)  -MB     Row Name 11/11/21 1045          Strength Comprehensive (MMT)    General Manual Muscle Testing (MMT) Assessment lower extremity strength deficits identified  -MB     Comment, General Manual Muscle Testing (MMT) Assessment Not formally tested due to recent sx.  Pt. able to move BLEs against gravity.  -MB     Row Name 11/11/21 1045          Motor Skills    Motor Skills coordination; therapeutic exercise  Issued illustrated HEP w/ spinal precautions.  -MB     Therapeutic Exercise shoulder; hip; knee; ankle  -MB     Row Name 11/11/21 1045          Shoulder (Therapeutic Exercise)    Shoulder (Therapeutic Exercise) AROM (active range of motion)  -MB     Shoulder AROM  (Therapeutic Exercise) bilateral; flexion  -MB     Row Name 11/11/21 1045          Hip (Therapeutic Exercise)    Hip (Therapeutic Exercise) isometric exercises; AAROM (active assistive range of motion)  Abd set  -MB     Hip AAROM (Therapeutic Exercise) bilateral; external rotation  -MB     Hip Isometrics (Therapeutic Exercise) bilateral; gluteal sets  -MB     Row Name 11/11/21 1045          Knee (Therapeutic Exercise)    Knee (Therapeutic Exercise) isometric exercises; strengthening exercise  -MB     Knee Isometrics (Therapeutic Exercise) bilateral; quad sets  -MB     Knee Strengthening (Therapeutic Exercise) bilateral; LAQ (long arc quad); 10 repetitions  -MB     Row Name 11/11/21 1045          Ankle (Therapeutic Exercise)    Ankle (Therapeutic Exercise) AAROM (active assistive range of motion)  -MB     Ankle AAROM (Therapeutic Exercise) bilateral; dorsiflexion; plantarflexion  -MB     Row Name 11/11/21 1045          Balance    Balance Assessment sitting static balance; sitting dynamic balance; standing static balance; standing dynamic balance  -MB     Static Standing Balance mild impairment; supported  -MB     Dynamic Standing Balance mild impairment; supported  -MB     Balance Interventions standing; sit to stand; weight shifting activity  -MB     Row Name 11/11/21 1045          Sensory Assessment (Somatosensory)    Bilateral LE Sensory Assessment light touch awareness; impaired  -MB           User Key  (r) = Recorded By, (t) = Taken By, (c) = Cosigned By    Initials Name Provider Type    Cleopatra Quiles, PT Physical Therapist               Goals/Plan    No documentation.                Clinical Impression     Row Name 11/11/21 8644          Pain Scale: Numbers Pre/Post-Treatment    Pretreatment Pain Rating 2/10  -MB     Posttreatment Pain Rating 2/10  -MB     Pain Location - Orientation incisional  -MB     Pain Location back  -MB     Pain Intervention(s) Medication (See MAR); Ambulation/increased  activity; Repositioned  -MB     Row Name 11/11/21 1517          Plan of Care Review    Plan of Care Reviewed With patient  -MB     Progress improving  -MB     Outcome Summary PT eval completed.  Patient very pleasant, w/ great motivation and effort.  She presents w/ post-surgical (in addition to baseline CP) weakness, impaired balance, and unsteady gait.  Issued illustrated HEP handout and reviewed spinal precautions; pt. verbalized understanding.  Pt. transferred sit to stand w/ RW and mod A x 2, and ambulated 15ft w/ min A + 1 following closely w/ chair.  Anticipate D/C home w/ spouse and OPPT later today.  -MB     Row Name 11/11/21 1517          Therapy Assessment/Plan (PT)    Patient/Family Therapy Goals Statement (PT) Increased independence w/ mobility.  -MB     Row Name 11/11/21 1517          Vital Signs    Pre Systolic BP Rehab --  VSS. RN cleared for PT.  -MB     Pre Patient Position Sitting  -MB     Intra Patient Position Standing  -MB     Post Patient Position Sitting  -MB     Row Name 11/11/21 1517          Positioning and Restraints    Pre-Treatment Position in bed  -MB     Post Treatment Position chair  -MB     In Chair notified nsg; reclined; call light within reach; encouraged to call for assist; exit alarm on; waffle cushion; legs elevated  -MB           User Key  (r) = Recorded By, (t) = Taken By, (c) = Cosigned By    Initials Name Provider Type    Cleopatra Quiles, PT Physical Therapist               Outcome Measures     Row Name 11/11/21 1045 11/11/21 0840       How much help from another person do you currently need...    Turning from your back to your side while in flat bed without using bedrails? 3  -MB 4  -TB    Moving from lying on back to sitting on the side of a flat bed without bedrails? 3  -MB 3  -TB    Moving to and from a bed to a chair (including a wheelchair)? 2  -MB 2  -TB    Standing up from a chair using your arms (e.g., wheelchair, bedside chair)? 2  -MB 2  -TB    Climbing  3-5 steps with a railing? 2  -MB 2  -TB    To walk in hospital room? 3  -MB 2  -TB    AM-PAC 6 Clicks Score (PT) 15  -MB 15  -TB    Row Name 11/11/21 1153 11/11/21 1045       Functional Assessment    Outcome Measure Options AM-PAC 6 Clicks Daily Activity (OT)  -MA AM-PAC 6 Clicks Basic Mobility (PT)  -MB          User Key  (r) = Recorded By, (t) = Taken By, (c) = Cosigned By    Initials Name Provider Type    Cleopatra Quiles, PT Physical Therapist    Marce Adams, RN Registered Nurse    Sydnie Bond, OT Occupational Therapist                             Physical Therapy Education                 Title: PT OT SLP Therapies (Done)     Topic: Physical Therapy (Done)     Point: Mobility training (Done)     Learning Progress Summary           Patient Acceptance, E,D,H, VU by MB at 11/11/2021 1528                   Point: Home exercise program (Done)     Learning Progress Summary           Patient Acceptance, E,D,H, VU by MB at 11/11/2021 1528                   Point: Body mechanics (Done)     Learning Progress Summary           Patient Acceptance, E,D,H, VU by MB at 11/11/2021 1528                   Point: Precautions (Done)     Learning Progress Summary           Patient Acceptance, E,D,H, VU by MB at 11/11/2021 1528                               User Key     Initials Effective Dates Name Provider Type [facility]    MB 06/16/21 -  Cleopatra Birmingham, PT Physical Therapist PT              PT Recommendation and Plan     Plan of Care Reviewed With: patient  Progress: improving  Outcome Summary: PT eval completed.  Patient very pleasant, w/ great motivation and effort.  She presents w/ post-surgical (in addition to baseline CP) weakness, impaired balance, and unsteady gait.  Issued illustrated HEP handout and reviewed spinal precautions; pt. verbalized understanding.  Pt. transferred sit to stand w/ RW and mod A x 2, and ambulated 15ft w/ min A + 1 following closely w/ chair.  Anticipate D/C home w/  spouse and OPPT later today.     Time Calculation:    PT Charges     Row Name 11/11/21 1529             Time Calculation    Start Time 1045  -MB      PT Received On 11/11/21  -MB              Untimed Charges    PT Eval/Re-eval Minutes 50  -MB              Total Minutes    Untimed Charges Total Minutes 50  -MB       Total Minutes 50  -MB            User Key  (r) = Recorded By, (t) = Taken By, (c) = Cosigned By    Initials Name Provider Type    Cleopatra Quiles, PT Physical Therapist              Therapy Charges for Today     Code Description Service Date Service Provider Modifiers Qty    [id number] HC PT EVAL MOD COMPLEXITY 4 11/11/2021 Cleopatra Birmingham, PT GP 1          PT G-Codes  Outcome Measure Options: AM-PAC 6 Clicks Daily Activity (OT)  AM-PAC 6 Clicks Score (PT): 15  AM-PAC 6 Clicks Score (OT): 16    Cleopatra Birmingham PT  11/11/2021

## 2021-11-11 NOTE — PLAN OF CARE
Goal Outcome Evaluation:  Plan of Care Reviewed With: patient           Outcome Summary: OT eval complete. Pt presents w/ increased pain, decreased activity tolerance, generalized weakness, and balance deficits limiting her ADL independence. Pt education on spinal precautions and importance of maintaining precautions w/ ADL tasks, verbalized understanding. Recommend home w/ A and OPPT at discharge.

## 2021-11-11 NOTE — CASE MANAGEMENT/SOCIAL WORK
Continued Stay Note   [facility]     Patient Name: Nikole Beltre  MRN: [id number]  Today's Date: 11/11/2021    Admit Date: 11/10/2021     Discharge Plan     Row Name 11/11/21 1116       Plan    Plan Home with 's assistance    Patient/Family in Agreement with Plan yes    Plan Comments Met with Ms. Beltre at the bedside for dicharge planning.  Ms. Beltre lives with her , Nikita, in [facility].  She states that she is ambulating independently, without any assistive devices.  She does have crutches, a wheelchair a bath bench and a rolling walker at home.  She denies any DME or home health needs.    Ms. Beltre has [employer] Medicare insurance with no interruption in coverage.  Her PCP is Felix Dahl.  She states that Nikita can assist her at home as needed and will be transporting her home when discharged.    CM will continue to follow.    Final Discharge Disposition Code 01 - home or self-care               Discharge Codes    No documentation.               Expected Discharge Date and Time     Expected Discharge Date Expected Discharge Time    Nov 11, 2021             Abbie Wu RN

## 2021-11-11 NOTE — PLAN OF CARE
Goal Outcome Evaluation:  Plan of Care Reviewed With: patient        Progress: improving  Outcome Summary: PT janna completed.  Patient very pleasant, w/ great motivation and effort.  She presents w/ post-surgical (in addition to baseline CP) weakness, impaired balance, and unsteady gait.  Issued illustrated HEP handout and reviewed spinal precautions; pt. verbalized understanding.  Pt. transferred sit to stand w/ RW and mod A x 2, and ambulated 15ft w/ min A + 1 following closely w/ chair.  Anticipate D/C home w/ spouse and OPPT later today.

## 2021-11-11 NOTE — PROGRESS NOTES
HOD# : 0    No events last night  Patient having minimal back pain and states she has more power in her legs when transferring from bed to commode etc.  Patient yet to walk around the unit.    I suspect that her pain in her legs will be much better when she is up and walking and patient is ecstatic.    Called and discussed plan of care with patient's physical therapist who is can work with her this a.m.  We will plan to discharge her later today.    L4-5 Lumbar spinal stenosis with neurogenic claudication    Stenosis, spinal, lumbar      Temp:  [97.3 °F (36.3 °C)-98.4 °F (36.9 °C)] 97.8 °F (36.6 °C)  Heart Rate:  [] 76  Resp:  [16-18] 18  BP: (128-162)/(64-99) 147/67  I/O last 3 completed shifts:  In: 1640 [P.O.:240; I.V.:1400]  Out: 1725 [Urine:1700; Blood:25]  No intake/output data recorded.  Vital signs were reviewed and documented in the chart      EXAM   Body mass index is 39.48 kg/m².      Patient appeared in good neurologic function with normal comprehension   CN grossly intact  Moves all extremities to command  Dorsiflexion plantar flexion reveals some spasticity which is baseline for patient  Patient has good strength and good sensation however    DIAGNOSIS  1. Spinal stenosis of lumbar region without neurogenic claudication    2. L4-5 Lumbar spinal stenosis with neurogenic claudication          PLAN    Get her up and walk with PT.  Patient will likely be comfortable going home.  Patient has plenty of equipment due to her cerebral palsy.    We will see her back in about 3 weeks for wound check

## 2021-11-11 NOTE — THERAPY EVALUATION
Patient Name: Nikole Beltre  : 1955    MRN: [id number]                              Today's Date: 2021       Admit Date: 11/10/2021    Visit Dx:     ICD-10-CM ICD-9-CM   1. Spinal stenosis of lumbar region without neurogenic claudication  M48.061 724.02   2. L4-5 Lumbar spinal stenosis with neurogenic claudication  M48.062 724.03     Patient Active Problem List   Diagnosis   • Cerebral palsy (HCC)   • L4-5 Lumbar spinal stenosis with neurogenic claudication   • Spondylosis of lumbar region without myelopathy or radiculopathy   • DDD (degenerative disc disease), lumbar   • Hypertrophy of ligamentum flavum   • Greater trochanteric bursitis of left hip   • At high risk for falls   • Abnormality of gait due to impairment of balance   • Cervical myelopathy with falls, weakness, incontinence (CMS/HCC)   • Myelopathy (HCC)   • S/P anterior cervical diskectomy of C4-5, 5-6, 6-7 as well as corpectomy 19   • Stenosis, spinal, lumbar     Past Medical History:   Diagnosis Date   • Anxiety and depression    • Arthritis    • Back problem    • Bowel incontinence     at night    • Cerebral palsy (HCC)    • Diverticulosis    • Eczema    • Esophageal spasm    • Glaucoma suspect    • Hearing loss    • History of seizure     as a child   • History of use of hearing aid in right ear    • Hypertension    • IBS (irritable bowel syndrome)    • Mild hearing loss    • PONV (postoperative nausea and vomiting)    • Pre-diabetes    • Wears glasses      Past Surgical History:   Procedure Laterality Date   • ACHILLES TENDON LENGTHENING      on both heels   • ANTERIOR CERVICAL DISCECTOMY W/ FUSION N/A 2019    Procedure: CERVICAL CORPECTOMY C5-C6 ACDF C4-7;  Surgeon: Jordon Villarreal MD;  Location: [facility];  Service: Neurosurgery   • BUNIONECTOMY Bilateral    • CHOLECYSTECTOMY     • COLONOSCOPY     • CYST REMOVAL     • DILATATION AND CURETTAGE      x2   • ENDOSCOPY     • LUMBAR LAMINECTOMY DISCECTOMY DECOMPRESSION N/A  11/10/2021    Procedure: LUMBAR LAMINECTOMY L4-5;  Surgeon: Jordon Villarreal MD;  Location: [facility];  Service: Neurosurgery;  Laterality: N/A;      General Information     Row Name 11/11/21 1135          OT Time and Intention    Document Type evaluation  -MA     Mode of Treatment occupational therapy  -MA     Row Name 11/11/21 1135          General Information    Patient Profile Reviewed yes  -MA     Prior Level of Function mod assist:; max assist:; bed mobility; transfer; dressing; independent:; all household mobility; community mobility; w/c or scooter  Pt reports she has primarily been using a manual w/c for mobility within past year. Pt has a supportive  who can provide A as needed  -MA     Existing Precautions/Restrictions fall; spinal  -MA     Barriers to Rehab medically complex; previous functional deficit  -MA     Row Name 11/11/21 1135          Living Environment    Lives With spouse  -MA     Row Name 11/11/21 1135          Cognition    Orientation Status (Cognition) oriented x 4  -MA     Row Name 11/11/21 1135          Safety Issues, Functional Mobility    Safety Issues Affecting Function (Mobility) safety precaution awareness; safety precautions follow-through/compliance; sequencing abilities  -MA     Impairments Affecting Function (Mobility) balance; endurance/activity tolerance; pain; strength  -MA           User Key  (r) = Recorded By, (t) = Taken By, (c) = Cosigned By    Initials Name Provider Type    Sydnie Bond OT Occupational Therapist                 Mobility/ADL's     Row Name 11/11/21 1138          Bed Mobility    Bed Mobility supine-sit  -MA     Supine-Sit [place] (Bed Mobility) minimum assist (75% patient effort); 2 person assist; verbal cues  -MA     Assistive Device (Bed Mobility) bed rails; head of bed elevated  -MA     Comment (Bed Mobility) Pt instructed in log-rolling technique to maintain spinal precautions w/ bed mobility, pt verbalized understanding of spinal  precautions.  -MA     Row Name 11/11/21 1138          Transfers    Transfers sit-stand transfer  -MA     Comment (Transfers) Pt mod Ax2 for STS from EOB w/ RW, VC's for sequencing and hand placement.  -MA     Sit-Stand [place] (Transfers) moderate assist (50% patient effort); 2 person assist; verbal cues  -MA     Row Name 11/11/21 1138          Sit-Stand Transfer    Assistive Device (Sit-Stand Transfers) walker, front-wheeled  -MA     Row Name 11/11/21 1138          Functional Mobility    Functional Mobility- Comment Deferred to PT  -MA     Row Name 11/11/21 1138          Activities of Daily Living    BADL Assessment/Intervention lower body dressing; upper body dressing; toileting  -MA     Row Name 11/11/21 1138          Lower Body Dressing Assessment/Training    [place] Level (Lower Body Dressing) don; socks; dependent (less than 25% patient effort); verbal cues  -MA     Position (Lower Body Dressing) supine  -MA     Comment (Lower Body Dressing) Pt reports she has AE for LBD at baseline and denies need for re-teaching. Pt provided w/ reacher and educated on how to use to maintain spinal precautions & increase safety w/ LBD.  -MA     Row Name 11/11/21 1138          Upper Body Dressing Assessment/Training    [place] Level (Upper Body Dressing) don; pajama/robe; minimum assist (75% patient effort); verbal cues  -MA     Position (Upper Body Dressing) edge of bed sitting  -MA     Row Name 11/11/21 1138          Toileting Assessment/Training    Comment (Toileting) Pt reports she has toilet aid at home and educated on importance of maintaining spinal precautions w/ toileting, pt verbalized understanding.  -MA           User Key  (r) = Recorded By, (t) = Taken By, (c) = Cosigned By    Initials Name Provider Type    Sydnie Bond OT Occupational Therapist               Obj/Interventions     Row Name 11/11/21 1145          Sensory Assessment (Somatosensory)    Sensory Assessment (Somatosensory) UE  sensation intact  -[facility] Name 11/11/21 1145          Vision Assessment/Intervention    Visual Impairment/Limitations WFL  -[facility] Name 11/11/21 1145          Range of Motion Comprehensive    General Range of Motion bilateral upper extremity ROM WFL  -[facility] Name 11/11/21 1145          Strength Comprehensive (MMT)    General Manual Muscle Testing (MMT) Assessment upper extremity strength deficits identified  -MA     Comment, General Manual Muscle Testing (MMT) Assessment B  grossly 4+/5  -MA     [facility] Name 11/11/21 1145          Balance    Balance Assessment sitting static balance; standing static balance  -MA     Static Sitting Balance WFL; unsupported; sitting, edge of bed  -MA     Static Standing Balance mild impairment; supported; standing  -MA     Balance Interventions sit to stand; occupation based/functional task  -MA           User Key  (r) = Recorded By, (t) = Taken By, (c) = Cosigned By    Initials Name Provider Type    Sydnie Bond OT Occupational Therapist               Goals/Plan     [facility] Name 11/11/21 1151          Transfer Goal 1 (OT)    Activity/Assistive Device (Transfer Goal 1, OT) bed-to-chair/chair-to-bed; toilet; commode; walker, rolling  -MA     [place] Level/Cues Needed (Transfer Goal 1, OT) minimum assist (75% or more patient effort); verbal cues required  -MA     Time Frame (Transfer Goal 1, OT) long term goal (LTG); 10 days  -MA     Progress/Outcome (Transfer Goal 1, OT) goal ongoing  -MA     Row Name 11/11/21 1151          Dressing Goal 1 (OT)    Activity/Device (Dressing Goal 1, OT) lower body dressing  don/doff socks w/ AAD to maintain spinal precautions  -MA     [place]/Cues Needed (Dressing Goal 1, OT) minimum assist (75% or more patient effort); verbal cues required  -MA     Time Frame (Dressing Goal 1, OT) long term goal (LTG); 10 days  -MA     Progress/Outcome (Dressing Goal 1, OT) goal ongoing  -[facility] Name 11/11/21 1151          Toileting Goal 1  (OT)    Activity/Device (Toileting Goal 1, OT) adjust/manage clothing; perform perineal hygiene; commode; grab bar/safety frame  -MA     [place] Level/Cues Needed (Toileting Goal 1, OT) minimum assist (75% or more patient effort); verbal cues required  -MA     Time Frame (Toileting Goal 1, OT) long term goal (LTG); 10 days  -MA     Progress/Outcome (Toileting Goal 1, OT) goal ongoing  -MA           User Key  (r) = Recorded By, (t) = Taken By, (c) = Cosigned By    Initials Name Provider Type    Sydnie Bond, RAMA Occupational Therapist               Clinical Impression     Row Name 11/11/21 1146          Pain Assessment    Additional Documentation Pain Scale: Numbers Pre/Post-Treatment (Group)  -MA     Row Name 11/11/21 1146          Pain Scale: Numbers Pre/Post-Treatment    Pretreatment Pain Rating 2/10  -MA     Posttreatment Pain Rating 2/10  -MA     Pain Location - Orientation incisional  -MA     Pain Location back  -MA     Pain Intervention(s) Repositioned; Ambulation/increased activity  -MA     Row Name 11/11/21 1146          Plan of Care Review    Plan of Care Reviewed With patient  -MA     Outcome Summary OT eval complete. Pt presents w/ increased pain, decreased activity tolerance, generalized weakness, and balance deficits limiting her ADL independence. Pt education on spinal precautions and importance of maintaining precautions w/ ADL tasks, verbalized understanding. Recommend home w/ A and OPPT at discharge.  -MA     Row Name 11/11/21 1146          Therapy Assessment/Plan (OT)    Rehab Potential (OT) good, to achieve stated therapy goals  -MA     Criteria for Skilled Therapeutic Interventions Met (OT) yes; skilled treatment is necessary  -MA     Therapy Frequency (OT) daily  -MA     Row Name 11/11/21 1146          Therapy Plan Review/Discharge Plan (OT)    Anticipated Discharge Disposition (OT) home with assist; home with outpatient therapy services  -[facility] 11/11/21 1146          Vital  Signs    Pre Systolic BP Rehab --  VSS - RN cleared OT eval  -MA     O2 Delivery Pre Treatment room air  -MA     O2 Delivery Intra Treatment room air  -MA     O2 Delivery Post Treatment room air  -MA     Pre Patient Position Supine  -MA     Intra Patient Position Standing  -MA     Post Patient Position Sitting  -MA     Row Name 11/11/21 1146          Positioning and Restraints    Pre-Treatment Position in bed  -MA     Post Treatment Position bed  -MA     In Bed sitting EOB; with PT  -MA           User Key  (r) = Recorded By, (t) = Taken By, (c) = Cosigned By    Initials Name Provider Type    Sydnie Bond, RAMA Occupational Therapist               Outcome Measures     Row Name 11/11/21 1153          How much help from another is currently needed...    Putting on and taking off regular lower body clothing? 2  -MA     Bathing (including washing, rinsing, and drying) 2  -MA     Toileting (which includes using toilet bed pan or urinal) 2  -MA     Putting on and taking off regular upper body clothing 3  -MA     Taking care of personal grooming (such as brushing teeth) 3  -MA     Eating meals 4  -MA     AM-PAC 6 Clicks Score (OT) 16  -MA     Row Name 11/11/21 0840          How much help from another person do you currently need...    Turning from your back to your side while in flat bed without using bedrails? 4  -TB     Moving from lying on back to sitting on the side of a flat bed without bedrails? 3  -TB     Moving to and from a bed to a chair (including a wheelchair)? 2  -TB     Standing up from a chair using your arms (e.g., wheelchair, bedside chair)? 2  -TB     Climbing 3-5 steps with a railing? 2  -TB     To walk in hospital room? 2  -TB     AM-PAC 6 Clicks Score (PT) 15  -TB     Row Name 11/11/21 1153          Functional Assessment    Outcome Measure Options AM-PAC 6 Clicks Daily Activity (OT)  -MA           User Key  (r) = Recorded By, (t) = Taken By, (c) = Cosigned By    Initials Name Provider Type    DELPHINE  Marce Betancourt, RN Registered Nurse    Sydnie Bond OT Occupational Therapist                Occupational Therapy Education                 Title: PT OT SLP Therapies (In Progress)     Topic: Occupational Therapy (In Progress)     Point: ADL training (Done)     Description:   Instruct learner(s) on proper safety adaptation and remediation techniques during self care or transfers.   Instruct in proper use of assistive devices.              Learning Progress Summary           Patient Acceptance, E, VU by MA at 11/11/2021 1153                   Point: Home exercise program (Not Started)     Description:   Instruct learner(s) on appropriate technique for monitoring, assisting and/or progressing therapeutic exercises/activities.              Learner Progress:  Not documented in this visit.          Point: Precautions (Done)     Description:   Instruct learner(s) on prescribed precautions during self-care and functional transfers.              Learning Progress Summary           Patient Acceptance, E, VU by MA at 11/11/2021 1153                   Point: Body mechanics (Done)     Description:   Instruct learner(s) on proper positioning and spine alignment during self-care, functional mobility activities and/or exercises.              Learning Progress Summary           Patient Acceptance, E, VU by MA at 11/11/2021 1153                               User Key     Initials Effective Dates Name Provider Type Discipline    MA 11/19/20 -  Sydnie White OT Occupational Therapist OT              OT Recommendation and Plan  Therapy Frequency (OT): daily  Plan of Care Review  Plan of Care Reviewed With: patient  Outcome Summary: OT eval complete. Pt presents w/ increased pain, decreased activity tolerance, generalized weakness, and balance deficits limiting her ADL independence. Pt education on spinal precautions and importance of maintaining precautions w/ ADL tasks, verbalized understanding. Recommend home w/ A and OPPT  at discharge.     Time Calculation:    Time Calculation- OT     Row Name 11/11/21 1154             Time Calculation- OT    OT Start Time 1009  -MA      OT Received On 11/11/21  -MA      OT Goal Re-Cert Due Date 11/21/21  -MA              Timed Charges    66648 - OT Therapeutic Activity Minutes 4  -MA      02884 - OT Self Care/Mgmt Minutes 6  -MA              Untimed Charges    OT Eval/Re-eval Minutes 31  -MA              Total Minutes    Timed Charges Total Minutes 10  -MA      Untimed Charges Total Minutes 31  -MA       Total Minutes 41  -MA            User Key  (r) = Recorded By, (t) = Taken By, (c) = Cosigned By    Initials Name Provider Type    Sydnie Bond OT Occupational Therapist              Therapy Charges for Today     Code Description Service Date Service Provider Modifiers Qty    50131182860 HC OT SELF CARE/MGMT/TRAIN EA 15 MIN 11/11/2021 Sydnie White OT GO 1    [id number] HC OT EVAL MOD COMPLEXITY 3 11/11/2021 Sydnie White OT GO 1               Sydnie White OT  11/11/2021

## 2021-11-11 NOTE — PLAN OF CARE
Goal Outcome Evaluation:  Plan of Care Reviewed With: patient          Problem: Adult Inpatient Plan of Care  Goal: Plan of Care Review  Outcome: Met  Goal: Patient-Specific Goal (Individualized)  Outcome: Met  Goal: Absence of Hospital-Acquired Illness or Injury  Outcome: Met  Intervention: Identify and Manage Fall Risk  Recent Flowsheet Documentation  Taken 11/11/2021 1200 by Marce Betancourt RN  Safety Promotion/Fall Prevention:   activity supervised   assistive device/personal items within reach   clutter free environment maintained   fall prevention program maintained   gait belt   toileting scheduled   safety round/check completed   room organization consistent   nonskid shoes/slippers when out of bed  Taken 11/11/2021 1000 by Marce Betancourt RN  Safety Promotion/Fall Prevention:   activity supervised   assistive device/personal items within reach   clutter free environment maintained   fall prevention program maintained   gait belt   toileting scheduled   safety round/check completed   room organization consistent   nonskid shoes/slippers when out of bed  Taken 11/11/2021 0840 by Marce Betancourt RN  Safety Promotion/Fall Prevention:   activity supervised   assistive device/personal items within reach   clutter free environment maintained   fall prevention program maintained   gait belt   toileting scheduled   safety round/check completed   room organization consistent   nonskid shoes/slippers when out of bed  Intervention: Prevent Skin Injury  Recent Flowsheet Documentation  Taken 11/11/2021 1200 by Marce Betancourt RN  Body Position: (up in chair) other (see comments)  Taken 11/11/2021 1000 by Marce Betancourt RN  Body Position: sitting up in bed  Taken 11/11/2021 0840 by Marce Betancourt RN  Body Position: sitting up in bed  Intervention: Prevent and Manage VTE (venous thromboembolism) Risk  Recent Flowsheet Documentation  Taken 11/11/2021 1200 by Marce Betancourt RN  VTE Prevention/Management:    bilateral   sequential compression devices off  Taken 11/11/2021 1000 by Marce Betancourt RN  VTE Prevention/Management:   bilateral   sequential compression devices off  Taken 11/11/2021 0840 by Marce Betancourt RN  VTE Prevention/Management:   bilateral   sequential compression devices off  Intervention: Prevent Infection  Recent Flowsheet Documentation  Taken 11/11/2021 1200 by Marce Betancourt RN  Infection Prevention: environmental surveillance performed  Taken 11/11/2021 1000 by Marce Betancourt RN  Infection Prevention: environmental surveillance performed  Taken 11/11/2021 0840 by Marce Betancourt RN  Infection Prevention: environmental surveillance performed  Goal: Optimal Comfort and Wellbeing  Outcome: Met  Intervention: Provide Person-Centered Care  Recent Flowsheet Documentation  Taken 11/11/2021 1200 by Marce Betancourt RN  Trust Relationship/Rapport: care explained  Taken 11/11/2021 1000 by Marce Betancourt RN  Trust Relationship/Rapport: care explained  Taken 11/11/2021 0840 by Marce Betancourt RN  Trust Relationship/Rapport:   care explained   choices provided  Goal: Readiness for Transition of Care  Outcome: Met     Problem: Hypertension Comorbidity  Goal: Blood Pressure in Desired Range  Outcome: Met  Intervention: Maintain Hypertension-Management Strategies  Recent Flowsheet Documentation  Taken 11/11/2021 0840 by Marce Betancourt RN  Medication Review/Management: medications reviewed     Problem: Fall Injury Risk  Goal: Absence of Fall and Fall-Related Injury  Outcome: Met  Intervention: Identify and Manage Contributors to Fall Injury Risk  Recent Flowsheet Documentation  Taken 11/11/2021 0840 by Marce Betancourt RN  Medication Review/Management: medications reviewed  Intervention: Promote Injury-Free Environment  Recent Flowsheet Documentation  Taken 11/11/2021 1200 by Marce Betancourt RN  Safety Promotion/Fall Prevention:   activity supervised   assistive device/personal items within reach    clutter free environment maintained   fall prevention program maintained   gait belt   toileting scheduled   safety round/check completed   room organization consistent   nonskid shoes/slippers when out of bed  Taken 11/11/2021 1000 by Marce Betancourt RN  Safety Promotion/Fall Prevention:   activity supervised   assistive device/personal items within reach   clutter free environment maintained   fall prevention program maintained   gait belt   toileting scheduled   safety round/check completed   room organization consistent   nonskid shoes/slippers when out of bed  Taken 11/11/2021 0840 by Marce Betancourt, RN  Safety Promotion/Fall Prevention:   activity supervised   assistive device/personal items within reach   clutter free environment maintained   fall prevention program maintained   gait belt   toileting scheduled   safety round/check completed   room organization consistent   nonskid shoes/slippers when out of bed     Problem: Bleeding (Spinal Surgery)  Goal: Absence of Bleeding  Outcome: Met     Problem: Bowel Elimination Impaired (Spinal Surgery)  Goal: Effective Bowel Elimination  Outcome: Met     Problem: Functional Ability Impaired (Spinal Surgery)  Goal: Optimal Functional Ability  Outcome: Met  Intervention: Optimize Functional Status  Recent Flowsheet Documentation  Taken 11/11/2021 1200 by Marce Betancourt RN  Positioning/Transfer Devices:   pillows   in use  Taken 11/11/2021 1000 by Marce Betancourt RN  Positioning/Transfer Devices:   pillows   in use  Taken 11/11/2021 0840 by Marce Betancourt RN  Positioning/Transfer Devices:   pillows   in use     Problem: Infection (Spinal Surgery)  Goal: Absence of Infection Signs and Symptoms  Outcome: Met     Problem: Neurologic Impairment (Spinal Surgery)  Goal: Optimal Neurologic Function  Outcome: Met  Intervention: Optimize Neurologic Function  Recent Flowsheet Documentation  Taken 11/11/2021 1200 by Marce Betancourt, RN  Body Position: (up in chair) other  (see comments)  Taken 11/11/2021 1000 by Marce Betancourt RN  Body Position: sitting up in bed  Taken 11/11/2021 0840 by Marce Betancourt RN  Body Position: sitting up in bed     Problem: Ongoing Anesthesia Effects (Spinal Surgery)  Goal: Anesthesia/Sedation Recovery  Outcome: Met  Intervention: Optimize Anesthesia Recovery  Recent Flowsheet Documentation  Taken 11/11/2021 1200 by Marce Betancourt RN  Safety Promotion/Fall Prevention:   activity supervised   assistive device/personal items within reach   clutter free environment maintained   fall prevention program maintained   gait belt   toileting scheduled   safety round/check completed   room organization consistent   nonskid shoes/slippers when out of bed  Taken 11/11/2021 1000 by Marce Betancourt RN  Safety Promotion/Fall Prevention:   activity supervised   assistive device/personal items within reach   clutter free environment maintained   fall prevention program maintained   gait belt   toileting scheduled   safety round/check completed   room organization consistent   nonskid shoes/slippers when out of bed  Taken 11/11/2021 0840 by Marce Betancourt RN  Safety Promotion/Fall Prevention:   activity supervised   assistive device/personal items within reach   clutter free environment maintained   fall prevention program maintained   gait belt   toileting scheduled   safety round/check completed   room organization consistent   nonskid shoes/slippers when out of bed     Problem: Pain (Spinal Surgery)  Goal: Acceptable Pain Control  Outcome: Met     Problem: Postoperative Nausea and Vomiting (Spinal Surgery)  Goal: Nausea and Vomiting Relief  Outcome: Met     Problem: Postoperative Urinary Retention (Spinal Surgery)  Goal: Effective Urinary Elimination  Outcome: Met  Intervention: Monitor and Manage Urinary Retention  Recent Flowsheet Documentation  Taken 11/11/2021 1200 by Marce Betancourt RN  Urinary Elimination Promotion: toileting offered  Taken 11/11/2021  1000 by Marce Betancourt, RN  Urinary Elimination Promotion: toileting offered  Taken 11/11/2021 0840 by Marce Betancourt, RN  Urinary Elimination Promotion: toileting offered

## 2021-11-11 NOTE — DISCHARGE SUMMARY
DISCHARGE SUMMARY  PATIENT:  KADE CASH  YOB: 1955  VISIT ID:  [id number]  PRIMARY CARE:  Felix Dahl MD  ADMITTING PHYSICIAN:  Jordon Villarreal MD    DATE OF ADMISSION:  11/10/2021  DATE OF DISCHARGE:  11/11/21      ADMITTING DIAGNOSIS:  Neurogenic claudication    DISCHARGE DIAGNOSIS:  Same    Past Medical History:   Diagnosis Date   • Anxiety and depression    • Arthritis    • Back problem    • Bowel incontinence     at night    • Cerebral palsy (HCC)    • Diverticulosis    • Eczema    • Esophageal spasm    • Glaucoma suspect    • Hearing loss    • History of seizure     as a child   • History of use of hearing aid in right ear    • Hypertension    • IBS (irritable bowel syndrome)    • Mild hearing loss    • PONV (postoperative nausea and vomiting)    • Pre-diabetes    • Wears glasses          PROCEDURES:  MIS laminectomy L4-5    BRIEF HOSPITAL COURSE:  Ms. Cash is a 66 y.o. female is well-known to the neurosurgical practice for having cerebral palsy and cervical myelopathy requiring a exotic cervical anterior fusion.  Patient really presented with neurogenic claudication and deconditioning.  Patient was determined surgical candidate by Dr. Jordon Villarreal on 11/10/2021 patient was taken to the operating room and a minimally invasive L4-5 laminectomy was performed.     The procedure without event and patient was admitted to the floor for observation pain control secondary to her very complex past medical history.  Patient has been up and walking to the bathroom and really felt a lot of difference in her lower extremities her back pain is much better and less painful than she expected.    Patient's incision is clean dry no sign of infection bleeding erythema.  Patient is ambulating take food by mouth voiding appropriately discharged    DISCHARGE MEDICATIONS:     Discharge Medications      New Medications      Instructions Start Date   HYDROcodone-acetaminophen 7.5-325 MG per  tablet  Commonly known as: NORCO   1 tablet, Oral, Every 6 Hours PRN      methocarbamol 750 MG tablet  Commonly known as: ROBAXIN   750 mg, Oral, 3 Times Daily, back pain from spasm         Continue These Medications      Instructions Start Date   celecoxib 200 MG capsule  Commonly known as: CeleBREX   200 mg, Oral, Daily      cloNIDine 0.1 MG tablet  Commonly known as: CATAPRES   0.1 mg, Oral, Every Night at Bedtime      DULoxetine 60 MG capsule  Commonly known as: CYMBALTA   60 mg, Oral, Daily      latanoprost 0.005 % ophthalmic solution  Commonly known as: XALATAN   1 drop, Both Eyes, Nightly      Lyrica 150 MG capsule  Generic drug: pregabalin   150 mg, Oral, Every Night at Bedtime      pantoprazole 40 MG EC tablet  Commonly known as: PROTONIX   40 mg, Oral, Nightly      ROPINIROLE HCL PO   1.5 mg, Oral, Nightly      tiZANidine 4 MG tablet  Commonly known as: ZANAFLEX   12 mg, Oral, Nightly PRN      Welchol 625 MG tablet  Generic drug: colesevelam   1,875 mg, Oral, As Needed         ASK your doctor about these medications      Instructions Start Date   traMADol 50 MG tablet  Commonly known as: ULTRAM   50 mg, Oral, Every 6 Hours PRN               ACTIVITY:  No heavy lifting bending or twisting    DIET:  Normal  FOLLOW UP:       Follow-up Information     Felix Dahl MD .    Specialty: Family Medicine  Contact information:  [address] [facility] [address] [phone]             Jet Bonilla PA-C Follow up in 3 week(s).    Specialties: Physician Assistant, Neurosurgery  Why: wound check  Contact information:  [address] [address] [phone]

## 2021-11-15 ENCOUNTER — TELEPHONE (OUTPATIENT)
Dept: NEUROSURGERY | Facility: CLINIC | Age: 66
End: 2021-11-15

## 2021-11-15 NOTE — TELEPHONE ENCOUNTER
Called and spoke to Abby to info her that the patient is not cleared to do PT yet as she is 5 days post op. They are aware and will wait on the PT until patient is cleared.

## 2021-11-15 NOTE — TELEPHONE ENCOUNTER
Provider: Phil   Caller: Abby - [facility]  Time of call: [id number]    Phone #: [phone]   Surgery:LUMBAR LAMINECTOMY L4-5    Surgery Date: 11/10/2021    Last visit:     Next visit: 12/06/2021    CAMILLA:         Reason for call:         [facility] called and is requesting a PT order. States patient is currently in the office.    She is 5 days post op. Is PT ok this soon? If so can some one please place an order?    Thanks

## 2021-11-15 NOTE — TELEPHONE ENCOUNTER
Caller: Abby  Relationship: [facility]  Best call back number: [phone]    What was the call regarding: ABBY WITH [facility] CALLED REGARDING THE PREVIOUS REQUEST OF SURGICAL RECORDS. I WAS UNSURE IF I AM ABLE TO RELAY THE MESSAGE OF THE PATIENT BEING ABLE TO START PT YET, I WARM TRANSFERRED TO GAB AT THE PRACTICE.

## 2021-12-06 ENCOUNTER — OFFICE VISIT (OUTPATIENT)
Dept: NEUROSURGERY | Facility: CLINIC | Age: 66
End: 2021-12-06

## 2021-12-06 VITALS
BODY MASS INDEX: 37.56 KG/M2 | DIASTOLIC BLOOD PRESSURE: 80 MMHG | TEMPERATURE: 97.1 F | HEIGHT: 64 IN | WEIGHT: 220 LBS | SYSTOLIC BLOOD PRESSURE: 126 MMHG

## 2021-12-06 DIAGNOSIS — G80.1 SPASTIC DIPLEGIC CEREBRAL PALSY (HCC): ICD-10-CM

## 2021-12-06 DIAGNOSIS — M48.062 LUMBAR STENOSIS WITH NEUROGENIC CLAUDICATION: Primary | ICD-10-CM

## 2021-12-06 DIAGNOSIS — G95.9 CERVICAL MYELOPATHY (HCC): ICD-10-CM

## 2021-12-06 PROCEDURE — 99024 POSTOP FOLLOW-UP VISIT: CPT | Performed by: PHYSICIAN ASSISTANT

## 2021-12-06 NOTE — PROGRESS NOTES
Subjective   Patient ID: Nikole Beltre is a 66 y.o. female is here today for follow-up for wound check.    HPI:      Patient is a very nice 66-year-old female well-known to the neurosurgical practice for having a very extravagant cervical spine.  Patient required a cervical corpectomy and had a incidental durotomy that really did well with this.  Patient also has cerebral palsy.    Patient developed neurogenic claudication and as such was taken to the OR on 11/10/2021 for L4-5 lumbar laminectomy decompression.  Patient is done exceedingly well from this and has noticed complete relief of her low back and lower extremity pain.  Patient still has some weakness in her lower extremities that has came about because of sedentary living due to the pain.    Patient back today and is very pleased with postsurgical outcome    The following portions of the patient's history were reviewed and updated as appropriate: allergies, current medications, past family history, past medical history, past social history, past surgical history and problem list.    Review of Systems   Constitutional: Positive for activity change. Negative for appetite change, chills, diaphoresis, fatigue, fever and unexpected weight change.   HENT: Negative for congestion, dental problem, drooling, ear discharge, ear pain, facial swelling, hearing loss, mouth sores, nosebleeds, postnasal drip, rhinorrhea, sinus pressure, sinus pain, sneezing, sore throat, tinnitus, trouble swallowing and voice change.    Eyes: Negative for photophobia, pain, discharge, redness, itching and visual disturbance.   Respiratory: Negative for apnea, cough, choking, chest tightness, shortness of breath, wheezing and stridor.    Cardiovascular: Positive for leg swelling. Negative for chest pain and palpitations.   Gastrointestinal: Negative for abdominal distention, abdominal pain, anal bleeding, blood in stool, constipation, diarrhea, nausea, rectal pain and vomiting.  "  Endocrine: Negative for cold intolerance, heat intolerance, polydipsia, polyphagia and polyuria.   Genitourinary: Negative for decreased urine volume, difficulty urinating, dyspareunia, dysuria, enuresis, flank pain, frequency, genital sores, hematuria, menstrual problem, pelvic pain, urgency, vaginal bleeding, vaginal discharge and vaginal pain.   Musculoskeletal: Positive for arthralgias, back pain and myalgias. Negative for gait problem, joint swelling, neck pain and neck stiffness.   Skin: Negative for color change, pallor, rash and wound.   Allergic/Immunologic: Negative for environmental allergies, food allergies and immunocompromised state.   Neurological: Positive for weakness. Negative for dizziness, tremors, seizures, syncope, facial asymmetry, speech difficulty, light-headedness, numbness and headaches.   Hematological: Negative for adenopathy. Does not bruise/bleed easily.   Psychiatric/Behavioral: Negative for agitation, behavioral problems, confusion, decreased concentration, dysphoric mood, hallucinations, self-injury, sleep disturbance and suicidal ideas. The patient is not nervous/anxious and is not hyperactive.          Objective    reports that she has never smoked. She has never used smokeless tobacco. She reports that she does not drink alcohol and does not use drugs.   SMOKING STATUS: Non-smoker    Physical Exam:   Vitals:/80 (BP Location: Left arm, Patient Position: Sitting, Cuff Size: Adult)   Temp 97.1 °F (36.2 °C)   Ht 162.6 cm (64\")   Wt 99.8 kg (220 lb)   BMI 37.76 kg/m²    BMI: Body mass index is 37.76 kg/m².       Incision:   The incision is well-healed and well approximated.  No signs of infection, bleeding, or erythema.    Musculoskeletal:                                            Dorsiflexion is 5/5 Bilaterally                    Plantarflexion is 5/5 bilaterally                    Hip Flexion 5/5 bilaterally.                        Neurologic:                            "              The patient is alert and oriented by 3.                       Sensation is equal bilaterally with no deficit.                        Reflexes:  2+ throughout       Assessment/Plan   Independent Review of Radiographic Studies:    No new films reviewed at this visit  Medical Decision Making:    Patient is doing very well at this point.  Patient is pleased postsurgical outcome.  Incision is clean, dry.  No signs of infection, bleeding, or erythema.    The patient will follow-up in 4 weeks after completing physical therapy. The patient understands instructions and limitations for the best post-operative success.    It has been a pleasure providing neurosurgical care.    Jet Bonilla PA-C      Patient's Body mass index is 37.76 kg/m². indicating that she is morbidly obese (BMI > 40 or > 35 with obesity - related health condition). Obesity-related health conditions include the following: none. Obesity is unchanged. BMI is is above average; BMI management plan is completed. We discussed portion control and increasing exercise.    Diagnoses and all orders for this visit:    1. L4-5 Lumbar spinal stenosis with neurogenic claudication (Primary)    2. Spastic diplegic cerebral palsy (HCC)    3. Cervical myelopathy with falls, weakness, incontinence (CMS/HCC)      No follow-ups on file.

## 2022-01-06 ENCOUNTER — OFFICE VISIT (OUTPATIENT)
Dept: NEUROSURGERY | Facility: CLINIC | Age: 67
End: 2022-01-06

## 2022-01-06 VITALS
BODY MASS INDEX: 38.24 KG/M2 | HEIGHT: 64 IN | TEMPERATURE: 98.2 F | OXYGEN SATURATION: 98 % | WEIGHT: 224 LBS | HEART RATE: 75 BPM

## 2022-01-06 DIAGNOSIS — M48.062 LUMBAR STENOSIS WITH NEUROGENIC CLAUDICATION: Primary | ICD-10-CM

## 2022-01-06 PROCEDURE — 99024 POSTOP FOLLOW-UP VISIT: CPT | Performed by: PHYSICIAN ASSISTANT

## 2022-01-06 RX ORDER — TRAMADOL HYDROCHLORIDE 50 MG/1
50 TABLET ORAL 2 TIMES DAILY
COMMUNITY

## 2022-01-06 NOTE — PROGRESS NOTES
Subjective   Patient ID: Nikole Beltre is a 67 y.o. female is here today for follow-up for PT follow-up.    HPI:       Patient is a very nice 66-year-old female well-known to the neurosurgical practice for having a very extravagant cervical spine.  Patient required a cervical corpectomy and had a incidental durotomy that really did well with this.  Patient also has cerebral palsy.     Patient developed neurogenic claudication and as such was taken to the OR on 11/10/2021 for L4-5 lumbar laminectomy decompression.  Patient is done exceedingly well from this and has noticed complete relief of her low back and lower extremity pain.     Patient is making good strides with regaining her strength in the lower extremities.  Patient is able to walk now and stand erect which is a major improvement than prior to surgery.  Patient is really wishing to push on her fitness but I have encouraged her to continue taking it easy as we do not want her set her back in her recovery.    Of also encouraged her to keep up the good work with the weight loss.    The following portions of the patient's history were reviewed and updated as appropriate: allergies, current medications, past family history, past medical history, past social history, past surgical history and problem list.    Review of Systems   Constitutional: Positive for activity change. Negative for appetite change, chills, diaphoresis, fatigue, fever and unexpected weight change.   HENT: Negative for congestion, dental problem, drooling, ear discharge, ear pain, facial swelling, hearing loss, mouth sores, nosebleeds, postnasal drip, rhinorrhea, sinus pressure, sinus pain, sneezing, sore throat, tinnitus, trouble swallowing and voice change.    Eyes: Negative for photophobia, pain, discharge, redness, itching and visual disturbance.   Respiratory: Negative for apnea, cough, choking, chest tightness, shortness of breath, wheezing and stridor.    Cardiovascular: Positive for leg  "swelling. Negative for chest pain and palpitations.   Gastrointestinal: Negative for abdominal distention, abdominal pain, anal bleeding, blood in stool, constipation, diarrhea, nausea, rectal pain and vomiting.   Endocrine: Negative for cold intolerance, heat intolerance, polydipsia, polyphagia and polyuria.   Genitourinary: Negative for decreased urine volume, difficulty urinating, dyspareunia, dysuria, enuresis, flank pain, frequency, genital sores, hematuria, menstrual problem, pelvic pain, urgency, vaginal bleeding, vaginal discharge and vaginal pain.   Musculoskeletal: Positive for back pain and myalgias. Negative for arthralgias, gait problem, joint swelling, neck pain and neck stiffness.   Skin: Negative for color change, pallor, rash and wound.   Allergic/Immunologic: Negative for environmental allergies, food allergies and immunocompromised state.   Neurological: Negative for dizziness, tremors, seizures, syncope, facial asymmetry, speech difficulty, weakness, light-headedness, numbness and headaches.   Hematological: Negative for adenopathy. Does not bruise/bleed easily.   Psychiatric/Behavioral: Negative for agitation, behavioral problems, confusion, decreased concentration, dysphoric mood, hallucinations, self-injury, sleep disturbance and suicidal ideas. The patient is not nervous/anxious and is not hyperactive.    All other systems reviewed and are negative.        Objective    reports that she has never smoked. She has never used smokeless tobacco. She reports that she does not drink alcohol and does not use drugs.   SMOKING STATUS: Non-smoker    Physical Exam:   Vitals:Pulse 75   Temp 98.2 °F (36.8 °C)   Ht 162.6 cm (64.02\")   Wt 102 kg (224 lb)   SpO2 98%   BMI 38.43 kg/m²    BMI: Body mass index is 38.43 kg/m².       Incision:   The incision is well-healed and well approximated.  No signs of infection, bleeding, or erythema.    Musculoskeletal:                                           " Dorsiflexion 4-5 bilaterally plantar flexion 4 out of 5 bilaterally                    Hip Flexion 4/5 bilaterally.                        Neurologic:                                         The patient is alert and oriented by 3.                       Sensation is equal bilaterally with no deficit.                        Reflexes:  2+ throughout       Assessment/Plan   Independent Review of Radiographic Studies:    No new films reviewed at this visit  Medical Decision Making:    Patient is really doing well and is no longer wheelchair ridden.  Patient is able to get up and move around with minimal pain she is very pleased with postsurgical outcome.    Patient is going to continue working with PT and she can follow-up with me in about 2 months to document her progression.  In the next month or so I am okay with her transitioning into more of a strength gaining exercise program    Patient's Body mass index is 38.43 kg/m². indicating that she is morbidly obese (BMI > 40 or > 35 with obesity - related health condition). Obesity-related health conditions include the following: none. Obesity is improving with treatment. BMI is is above average; BMI management plan is completed. We discussed portion control and increasing exercise.    There are no diagnoses linked to this encounter.  No follow-ups on file.         Answers for HPI/ROS submitted by the patient on 12/30/2021  Please describe your symptoms.: Surgery Follow Up.  Have you had these symptoms before?: Yes  How long have you been having these symptoms?: Greater than 2 weeks  Please list any medications you are currently taking for this condition.: Welchol (625 mg) - take as needed for loose stools,    Lyrica (150 mg) -- Pain ,    Tizanidine HCL (4 mg) -- Muscle Relaxer,    Latanoprost 0.005% -- Eye drops ,    Clonidine (0.1 mg) -- High Blood Pressure,    Ropinirole HCL (.75 mg) -- Restless Legs,    Celebrex (200 mg),    Voltaren 1 % (Arthritis pain),     Betamethasone DP Aug (0.05%),    Duloxetine (60 mg) - Antidepressant  Please describe any probable cause for these symptoms. : Seeing improvements.  What is the primary reason for your visit?: Other

## 2022-03-07 ENCOUNTER — OFFICE VISIT (OUTPATIENT)
Dept: NEUROSURGERY | Facility: CLINIC | Age: 67
End: 2022-03-07

## 2022-03-07 VITALS — WEIGHT: 224 LBS | TEMPERATURE: 97.1 F | BODY MASS INDEX: 38.24 KG/M2 | HEIGHT: 64 IN

## 2022-03-07 DIAGNOSIS — G80.1 SPASTIC DIPLEGIC CEREBRAL PALSY: ICD-10-CM

## 2022-03-07 DIAGNOSIS — M48.062 LUMBAR STENOSIS WITH NEUROGENIC CLAUDICATION: Primary | ICD-10-CM

## 2022-03-07 DIAGNOSIS — G95.9 CERVICAL MYELOPATHY: ICD-10-CM

## 2022-03-07 PROCEDURE — 99213 OFFICE O/P EST LOW 20 MIN: CPT | Performed by: NEUROLOGICAL SURGERY

## 2022-03-07 NOTE — PROGRESS NOTES
NAME: KADE CASH   DOS: 3/7/2022  : 1955  PCP: Felix Dahl MD    Chief Complaint: Follow-up surgery    History of Present Illness:  67 y.o. female   Is a 67-year-old female with a history of complex mobility issues she presented with a history of neurogenic claudication and underwent uncomplicated L4-5 minimally invasive laminectomy November of this year    This was performed from the left side approach    She has a history of complex C4-5 C5-6 and C6-7 anterior cervical corpectomy to level done as well she is undergone follow-up work-up for this she had advanced cervical myelopathy she had a intraprocedural CSF leak that cause no sequela    She is here for follow-up she is quite motivated    PMHX  Allergies:  Allergies   Allergen Reactions   • Percocet [Oxycodone-Acetaminophen] Hives   • Lanolin Rash     Medications    Current Outpatient Medications:   •  celecoxib (CeleBREX) 200 MG capsule, Take 200 mg by mouth Daily., Disp: , Rfl: 11  •  CloNIDine (CATAPRES) 0.1 MG tablet, Take 0.1 mg by mouth every night at bedtime., Disp: , Rfl: 0  •  colesevelam (WELCHOL) 625 MG tablet, Take 1,875 mg by mouth As Needed (For IBS symptoms)., Disp: , Rfl:   •  DULoxetine (CYMBALTA) 60 MG capsule, Take 60 mg by mouth Daily., Disp: , Rfl:   •  HYDROcodone-acetaminophen (NORCO) 7.5-325 MG per tablet, Take 1 tablet by mouth Every 6 (Six) Hours As Needed for Moderate Pain ., Disp: 30 tablet, Rfl: 0  •  latanoprost (XALATAN) 0.005 % ophthalmic solution, Administer 1 drop to both eyes Every Night., Disp: , Rfl: 11  •  pantoprazole (PROTONIX) 40 MG EC tablet, Take 40 mg by mouth Every Night., Disp: , Rfl:   •  pregabalin (LYRICA) 150 MG capsule, Take 150 mg by mouth every night at bedtime., Disp: , Rfl:   •  ROPINIROLE HCL PO, Take 1.5 mg by mouth Every Night., Disp: , Rfl:   •  tiZANidine (ZANAFLEX) 4 MG tablet, Take 12 mg by mouth At Night As Needed for Muscle Spasms., Disp: , Rfl:   •  traMADol (ULTRAM) 50 MG  tablet, Take 50 mg by mouth 2 (Two) Times a Day., Disp: , Rfl:   Past Medical History:  Past Medical History:   Diagnosis Date   • Anxiety and depression    • Arthritis    • Back problem    • Bowel incontinence     at night    • Cerebral palsy (HCC)    • Diverticulosis    • Eczema    • Esophageal spasm    • Glaucoma suspect    • Hearing loss    • History of seizure     as a child   • History of use of hearing aid in right ear    • Hypertension    • IBS (irritable bowel syndrome)    • Mild hearing loss    • PONV (postoperative nausea and vomiting)    • Pre-diabetes    • Wears glasses      Past Surgical History:  Past Surgical History:   Procedure Laterality Date   • ACHILLES TENDON LENGTHENING      on both heels   • ANTERIOR CERVICAL DISCECTOMY W/ FUSION N/A 1/23/2019    Procedure: CERVICAL CORPECTOMY C5-C6 ACDF C4-7;  Surgeon: Jordon Villarreal MD;  Location:  [facility];  Service: Neurosurgery   • BUNIONECTOMY Bilateral    • CHOLECYSTECTOMY     • COLONOSCOPY     • CYST REMOVAL     • DILATATION AND CURETTAGE      x2   • ENDOSCOPY     • LUMBAR LAMINECTOMY DISCECTOMY DECOMPRESSION N/A 11/10/2021    Procedure: LUMBAR LAMINECTOMY L4-5;  Surgeon: Jordon Villarreal MD;  Location:  [facility];  Service: Neurosurgery;  Laterality: N/A;     Social Hx:  Social History     Tobacco Use   • Smoking status: Never Smoker   • Smokeless tobacco: Never Used   Vaping Use   • Vaping Use: Never used   Substance Use Topics   • Alcohol use: No   • Drug use: No     Family Hx:  Family History   Problem Relation Age of Onset   • Arthritis Mother    • Asthma Mother    • Cancer Mother    • Hypertension Mother    • Cancer Father    • Parkinsonism Father    • Diabetes Brother    • Hypertension Brother    • Cancer Maternal Aunt    • Diabetes Maternal Uncle    • Heart disease Paternal Grandfather      Review of Systems:        Review of Systems     Review of systems negative for fever chills or findings    Physical Examination:  There were no  "vitals filed for this visit.   General Appearance:   Well developed, well nourished, well groomed, alert, and cooperative.  Neurological examination:  Neurologic Exam  Her incision looks pristine    She is awake alert and follows commands    COVID mask was left in place  She is markedly strong in her upper extremities she can move her arms overhead without difficulty she has 5-5 bicep strength tricep strength intrinsic strength is remarkably better    She has a trace Raymond's on the right side  Her incisions in her anterior neck are normal there is no swelling  Lumbar spinal incisions pristine  She has 4-5 iliopsoas stronger on the right than the left  She has bilateral pre-existing mild foot drops EHLs are 3 to 4-5 bilaterally  Plantar flexor is 3 to 4-5 bilaterally    She still in a wheelchair  She can reportedly walk with some assistance of a walker and a cane  she can get out of a chair with some assistance    Review of Imaging/DATA:  No imaging-reviewed prior op notes and findings  Diagnoses/Plan:    Ms. Beltre is a 67 y.o. female   1.  Lumbar spondylosis quite impressive underwent minimally invasive surgery marked improvement in symptomatology with stable findings of lower extremity bilateral weakness from \"cerebral palsy \"this is complicated by general mobility issues cervical spondylitic myelopathy etc. still has some back pain overall she reports improvement from surgery about 80 to 90%    2.  Cervical spondylitic myelopathy-much improved from cervical corpectomy almost no intrinsic weakness trace Raymond's on the right    From a neurosurgical standpoint happy to reevaluate her in the future at any time she requires full imaging from the cervical to the lumbar spine should anything change given the complex as well as lumbar flexion-extension films etc. right now I do not anticipate any major issues any of the additional surgery she would require will be quite invasive would be my suspicion we will wish her " the best and see her back as needed

## 2022-03-07 NOTE — PROGRESS NOTES
NAME: KADE CASH   DOS: 3/7/2022  : 1955  PCP: Felix Dahl MD    Chief Complaint:    Chief Complaint   Patient presents with   • Back Pain       History of Present Illness:  67 y.o. female       PMHX  Allergies:  Allergies   Allergen Reactions   • Percocet [Oxycodone-Acetaminophen] Hives   • Lanolin Rash     Medications    Current Outpatient Medications:   •  celecoxib (CeleBREX) 200 MG capsule, Take 200 mg by mouth Daily., Disp: , Rfl: 11  •  CloNIDine (CATAPRES) 0.1 MG tablet, Take 0.1 mg by mouth every night at bedtime., Disp: , Rfl: 0  •  colesevelam (WELCHOL) 625 MG tablet, Take 1,875 mg by mouth As Needed (For IBS symptoms)., Disp: , Rfl:   •  DULoxetine (CYMBALTA) 60 MG capsule, Take 60 mg by mouth Daily., Disp: , Rfl:   •  HYDROcodone-acetaminophen (NORCO) 7.5-325 MG per tablet, Take 1 tablet by mouth Every 6 (Six) Hours As Needed for Moderate Pain ., Disp: 30 tablet, Rfl: 0  •  latanoprost (XALATAN) 0.005 % ophthalmic solution, Administer 1 drop to both eyes Every Night., Disp: , Rfl: 11  •  pantoprazole (PROTONIX) 40 MG EC tablet, Take 40 mg by mouth Every Night., Disp: , Rfl:   •  pregabalin (LYRICA) 150 MG capsule, Take 150 mg by mouth every night at bedtime., Disp: , Rfl:   •  ROPINIROLE HCL PO, Take 1.5 mg by mouth Every Night., Disp: , Rfl:   •  tiZANidine (ZANAFLEX) 4 MG tablet, Take 12 mg by mouth At Night As Needed for Muscle Spasms., Disp: , Rfl:   •  traMADol (ULTRAM) 50 MG tablet, Take 50 mg by mouth 2 (Two) Times a Day., Disp: , Rfl:   Past Medical History:  Past Medical History:   Diagnosis Date   • Anxiety and depression    • Arthritis    • Back problem    • Bowel incontinence     at night    • Cerebral palsy (HCC)    • Diverticulosis    • Eczema    • Esophageal spasm    • Glaucoma suspect    • Hearing loss    • History of seizure     as a child   • History of use of hearing aid in right ear    • Hypertension    • IBS (irritable bowel syndrome)    • Mild hearing loss    •  PONV (postoperative nausea and vomiting)    • Pre-diabetes    • Wears glasses      Past Surgical History:  Past Surgical History:   Procedure Laterality Date   • ACHILLES TENDON LENGTHENING      on both heels   • ANTERIOR CERVICAL DISCECTOMY W/ FUSION N/A 1/23/2019    Procedure: CERVICAL CORPECTOMY C5-C6 ACDF C4-7;  Surgeon: Jordon Villarreal MD;  Location: [facility];  Service: Neurosurgery   • BUNIONECTOMY Bilateral    • CHOLECYSTECTOMY     • COLONOSCOPY     • CYST REMOVAL     • DILATATION AND CURETTAGE      x2   • ENDOSCOPY     • LUMBAR LAMINECTOMY DISCECTOMY DECOMPRESSION N/A 11/10/2021    Procedure: LUMBAR LAMINECTOMY L4-5;  Surgeon: Jordon Villarreal MD;  Location: [facility];  Service: Neurosurgery;  Laterality: N/A;     Social Hx:  Social History     Tobacco Use   • Smoking status: Never Smoker   • Smokeless tobacco: Never Used   Vaping Use   • Vaping Use: Never used   Substance Use Topics   • Alcohol use: No   • Drug use: No     Family Hx:  Family History   Problem Relation Age of Onset   • Arthritis Mother    • Asthma Mother    • Cancer Mother    • Hypertension Mother    • Cancer Father    • Parkinsonism Father    • Diabetes Brother    • Hypertension Brother    • Cancer Maternal Aunt    • Diabetes Maternal Uncle    • Heart disease Paternal Grandfather      Review of Systems:        Review of Systems   Constitutional: Negative for activity change, appetite change, chills, diaphoresis, fatigue, fever and unexpected weight change.   HENT: Negative for congestion, dental problem, drooling, ear discharge, ear pain, facial swelling, hearing loss, mouth sores, nosebleeds, postnasal drip, rhinorrhea, sinus pressure, sneezing, sore throat, tinnitus, trouble swallowing and voice change.    Eyes: Negative for photophobia, pain, discharge, redness, itching and visual disturbance.   Respiratory: Negative for apnea, cough, choking, chest tightness, shortness of breath, wheezing and stridor.    Cardiovascular:  Negative for chest pain, palpitations and leg swelling.   Gastrointestinal: Negative for abdominal distention, abdominal pain, anal bleeding, blood in stool, constipation, diarrhea, nausea, rectal pain and vomiting.   Endocrine: Negative for cold intolerance, heat intolerance, polydipsia, polyphagia and polyuria.   Genitourinary: Negative for decreased urine volume, difficulty urinating, dysuria, enuresis, flank pain, frequency, genital sores, hematuria and urgency.   Musculoskeletal: Positive for back pain. Negative for arthralgias, gait problem, joint swelling, myalgias, neck pain and neck stiffness.   Skin: Negative for color change, pallor, rash and wound.   Allergic/Immunologic: Negative for environmental allergies, food allergies and immunocompromised state.   Neurological: Negative for dizziness, tremors, seizures, syncope, facial asymmetry, speech difficulty, weakness, light-headedness, numbness and headaches.   Hematological: Negative for adenopathy. Does not bruise/bleed easily.   Psychiatric/Behavioral: Negative for agitation, behavioral problems, confusion, decreased concentration, dysphoric mood, hallucinations, self-injury, sleep disturbance and suicidal ideas. The patient is not nervous/anxious and is not hyperactive.    All other systems reviewed and are negative.           Physical Examination:  Vitals:    03/07/22 0947   Temp: 97.1 °F (36.2 °C)      General Appearance:   Well developed, well nourished, well groomed, alert, and cooperative.  Neurological examination:  Neurologic Exam      Review of Imaging/DATA:    Diagnoses/Plan:    Ms. Beltre is a 67 y.o. female ***

## 2022-03-24 RX ORDER — METHOCARBAMOL 750 MG/1
TABLET, FILM COATED ORAL
Qty: 90 TABLET | Refills: 0 | Status: SHIPPED | OUTPATIENT
Start: 2022-03-24

## 2022-03-24 NOTE — TELEPHONE ENCOUNTER
Provider:  Phil  Caller:  Automated refill request  Surgery:  LUMBAR LAMINECTOMY L4-5  Surgery Date:  11/10/2021  Last visit:  Office Visit with Jordon Villarreal MD (03/07/2022)  Next visit: NA    Reason for call:      Please deny medication, patient states that she does not need a refill.        I spoke to patient regarding this medication refill, as it was d/c on 03/14/2019.     Patient states that she is taking Ultram PRN and she said not to worry about refilling this medication.     I stated to patient that I will have the providers deny medication and patient noted understanding in agreement.     Requested Prescriptions     Pending Prescriptions Disp Refills   • methocarbamol (ROBAXIN) 750 MG tablet [Pharmacy Med Name: METHOCARBAMOL 750 MG TABLET] 90 tablet 0     Sig: TAKE 1 TABLET BY MOUTH 3 (THREE) TIMES A DAY FOR 90 DOSES FOR BACK PAIN FROM SPASM

## 2022-04-25 RX ORDER — METHOCARBAMOL 750 MG/1
TABLET, FILM COATED ORAL
Qty: 90 TABLET | Refills: 0 | OUTPATIENT
Start: 2022-04-25

## 2022-04-25 NOTE — TELEPHONE ENCOUNTER
Provider:  Phil  Caller:  Automated refill request  Surgery:  CERVICAL CORPECTOMY C5-C6 ACDF C4-7-01/23/2019  Surgery (2): LUMBAR LAMINECTOMY L4-5- 11/01/2021  Last visit: Office Visit with Jordon Villarreal MD (03/07/2022)  Next visit: NA    Reason for call:    Per last encounter patient stated that she did not need this refilled but medication was approved anyway. Refill with Jordon Villarreal MD (03/24/2022)   Sign if appropriate.          Requested Prescriptions     Pending Prescriptions Disp Refills   • methocarbamol (ROBAXIN) 750 MG tablet [Pharmacy Med Name: METHOCARBAMOL 750 MG TABLET] 90 tablet 0     Sig: TAKE 1 TABLET BY MOUTH 3 TIMES A DAY FOR BACK PAIN FROM SPASMS

## 2022-07-15 ENCOUNTER — TELEPHONE (OUTPATIENT)
Dept: NEUROSURGERY | Facility: CLINIC | Age: 67
End: 2022-07-15

## 2022-07-15 NOTE — TELEPHONE ENCOUNTER
Clinical will have to contact the patient and advise her of any risks this may in tell.    I will be more than happy to send a letter if this allowed by a RAMON

## 2022-07-15 NOTE — TELEPHONE ENCOUNTER
If she feels up for it that is fine. However, she has cerebral palsy and a hx of cervical myelopathy which causes her to have mobility issues...     Please explain to pt that a fall could put her at risk for injury so she needs to be careful..     Will get official answer from JAN

## 2022-07-15 NOTE — TELEPHONE ENCOUNTER
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 Ok to provide clearance letter. If so any restrictions or specifics?

## 2022-07-20 NOTE — TELEPHONE ENCOUNTER
Informed pt that she may use a riding  as long as she is careful and knows the risks. Pt stated she would be careful and would take care to avoid troublesome areas that would require her to get off of the mower quickly. Pt stated a letter was not necessary. Pt verbalized understanding and was thankful for the return call.

## 2022-11-28 ENCOUNTER — CLINICAL SUPPORT (OUTPATIENT)
Dept: GENETICS | Facility: HOSPITAL | Age: 67
End: 2022-11-28

## 2022-11-28 DIAGNOSIS — Z80.8 FAMILY HISTORY OF THYROID CANCER: ICD-10-CM

## 2022-11-28 DIAGNOSIS — Z80.3 FAMILY HISTORY OF BREAST CANCER: ICD-10-CM

## 2022-11-28 DIAGNOSIS — Z80.41 FAMILY HISTORY OF OVARIAN CANCER: ICD-10-CM

## 2022-11-28 DIAGNOSIS — Z13.79 GENETIC TESTING: Primary | ICD-10-CM

## 2022-11-28 NOTE — PROGRESS NOTES
Nikole Beltre, a 67-year-old female, was referred for genetic counseling due to a family history of breast cancer. Genetic counseling was provided via telephone.  Ms. Beltre confirmed her name, date of birth, and that she is physically located in [place] at the time of the visit. She has no personal history of cancer.  She was 16 years old at menarche and is nulliparous. She went through menopause at 57/58 and retains her uterus and ovaries.  Ms. Beltre’s most recent mammogram was in March/April 2022 and was normal. She had a colonoscopy about 8 years ago.  She reports a history of 3-4 benign polyps. She was interested in discussing her risk for a hereditary cancer syndrome.   Ms. Beltre decided to pursue comprehensive genetic testing to evaluate her risk of cancer, therefore the CancerNext Expanded Panel was ordered through [employer] which analyzes 77 genes associated with an increased cancer risk. [employer] will mail a saliva kit directly to her home.  Results are expected in 2-3 weeks after the sample is received.      PERTINENT FAMILY HISTORY: (See attached pedigree)   Mother:   Breast cancer     Ovarian cancer     Thyroid cancer  Father:   Skin cancer  Mat Aunt 1:  Lung cancer  Mat Aunt 2:  Lung cancer     Brain cancer  Mat Grandfather: Skin cancer  Pat Aunt:  Breast cancer  Niece:   Thyroid cancer, mid 30s    We do not have medical records regarding any of these diagnoses.       RISK ASSESSMENT:  Ms. Beltre’s family history of breast cancer raises the question of a hereditary cancer syndrome.  NCCN guidelines for genetic testing for BRCA1/2 states that individuals with a close relative with ovarian cancer at any age may consider genetic testing. With Ms. Beltre’s mother’s diagnosis, she would clearly meet this criteria. This risk assessment is based on the family history information provided at the time of the appointment.  The assessment could change in the future should new information be obtained.    GENETIC COUNSELING (30  minutes):  We reviewed the family history information in detail. Cases of cancer follow three general patterns: sporadic, familial, and hereditary.  While most cancer is sporadic, some cases appear to occur in family clusters.  These cases are said to be familial and account for 10-20% of cancer cases.  Familial cases may be due to a combination of shared genes and environmental factors among family members.  In even fewer cases, the risk for cancer is inherited, and the genes responsible for the increased cancer risk are known.       Family histories typical of hereditary cancer syndromes usually include multiple first- and second-degree relatives diagnosed with cancer types that define a syndrome.  These cases tend to be diagnosed at younger-than-expected ages and can be bilateral or multifocal.  The cancer in these families follows an autosomal dominant inheritance pattern, which indicates the likely presence of a mutation in a cancer susceptibility gene.  Children and siblings of an individual believed to carry this mutation have a 50% chance of inheriting that mutation, thereby inheriting the increased risk to develop cancer.  These mutations can be passed down from the maternal or the paternal lineage.     Due to Ms. Beltre’s family history of breast cancer, we discussed hereditary breast cancer. Hereditary breast cancer accounts for 5-10% of all cases of breast cancer.  A significant proportion (50%) of hereditary breast cancer can be attributed to mutations in the BRCA1 and BRCA2 genes.  Mutations in these genes confer an increased risk for breast cancer, ovarian cancer, male breast cancer, prostate cancer, and pancreatic cancer.  Women with a BRCA1 or BRCA2 mutation have up to an 87% lifetime risk of breast cancer and up to a 44% risk of ovarian cancer.  There are other clinically significant breast cancer related genes in addition to BRCA1/2, including PALB2, SHAUN, and CHEK2.     There are other hereditary  cancer syndromes as well. Based on Ms. Beltre’s family history and her desire to get more information regarding her personal risks, testing was pursued through a multigene panel evaluating several other genes known to increase the risk for cancer.    GENETIC TESTING:  The risks, benefits, and limitations of genetic testing and implications for clinical management following testing were reviewed.  DNA test results can influence decisions regarding screening and prevention.  Genetic testing can have significant psychological implications for both individuals and families. Also discussed was the possibility of employment and insurance discrimination based on genetic test results and the laws in place to prevent this, as well as the limitations of these laws.       We discussed panel testing, which would involve testing 77 genes associated with increased cancer risk. The implications of a positive or negative test result were discussed.  We also discussed the importance of testing an affected relative and how a negative result for Ms. Beltre wouldn’t necessarily mean the cancers presenting in her family weren’t due to a genetic mutation that Ms. Beltre did not inherit.  In general, a negative genetic test result is most informative if a mutation has first been established in an affected member of the family.  In cases where an affected individual is not available or interested in testing, it is appropriate to offer testing to an unaffected individual. We discussed the possibility that, in some cases, genetic test results may be ambiguous due to the identification of a genetic variant. These variants may or may not be associated with an increased cancer risk. With multigene panel testing, it is not uncommon for a variant of uncertain significance (VUS) to be identified.  If a VUS is identified, testing family members is typically not recommended and screening recommendations are made based on the family history.  The laboratories  that perform genetic testing work to reclassify the VUS and send out an amended report if and when a VUS is reclassified.  The majority of variant findings are ultimately reclassified to a negative result. Given Ms. Beltre’s family history, a negative test result does not eliminate all cancer risk, although the risk would not be as high as it would with positive genetic testing.     PLAN: Genetic testing was ordered via the CancerNext Expanded Panel through [employer]. [facility] will mail a saliva kit directly to her home. Results are expected in 2-3 weeks after the sample has been received and will be called out to Ms. Beltre. If she has any questions in the meantime, she is welcome to call me at [phone].      Virginia Alex MS, [facility] [place], [facility]  Licensed Certified Genetic Counselor

## 2023-02-09 ENCOUNTER — TELEPHONE (OUTPATIENT)
Dept: NEUROSURGERY | Facility: CLINIC | Age: 68
End: 2023-02-09
Payer: MEDICARE

## 2023-02-09 NOTE — TELEPHONE ENCOUNTER
PT JUST WANTED DR. MORIN TO KNOW THAT SHE DROPPED 4 PANT SIZES, SHE IS NOW IN A SIZE 14W, SWIMMING 5 DAYS A WEEK 40-42 LAPS A DAY. SHE WANTED TO SAY THANK YOU FOR BEING HARD ON HER AND PUSHING HER TO DO BETTER.

## 2024-01-17 DIAGNOSIS — M48.062 LUMBAR STENOSIS WITH NEUROGENIC CLAUDICATION: Primary | ICD-10-CM

## 2024-01-17 DIAGNOSIS — G95.9 CERVICAL MYELOPATHY: ICD-10-CM

## 2024-01-17 DIAGNOSIS — G80.1 SPASTIC DIPLEGIC CEREBRAL PALSY: ICD-10-CM

## 2024-02-05 ENCOUNTER — OFFICE VISIT (OUTPATIENT)
Dept: NEUROSURGERY | Facility: CLINIC | Age: 69
End: 2024-02-05
Payer: MEDICARE

## 2024-02-05 ENCOUNTER — HOSPITAL ENCOUNTER (OUTPATIENT)
Dept: MRI IMAGING | Facility: HOSPITAL | Age: 69
Discharge: HOME | End: 2024-02-05
Payer: MEDICARE

## 2024-02-05 ENCOUNTER — HOSPITAL ENCOUNTER (OUTPATIENT)
Dept: GENERAL RADIOLOGY | Facility: HOSPITAL | Age: 69
Discharge: HOME | End: 2024-02-05
Payer: MEDICARE

## 2024-02-05 ENCOUNTER — APPOINTMENT (OUTPATIENT)
Dept: OTHER | Facility: HOSPITAL | Age: 69
End: 2024-02-05
Payer: MEDICARE

## 2024-02-05 ENCOUNTER — TELEPHONE (OUTPATIENT)
Dept: NEUROSURGERY | Facility: CLINIC | Age: 69
End: 2024-02-05

## 2024-02-05 VITALS — HEIGHT: 64 IN | TEMPERATURE: 97.3 F | BODY MASS INDEX: 32.95 KG/M2 | WEIGHT: 193 LBS

## 2024-02-05 DIAGNOSIS — M48.062 LUMBAR STENOSIS WITH NEUROGENIC CLAUDICATION: ICD-10-CM

## 2024-02-05 DIAGNOSIS — G95.9 CERVICAL MYELOPATHY: ICD-10-CM

## 2024-02-05 DIAGNOSIS — G80.1 SPASTIC DIPLEGIC CEREBRAL PALSY: ICD-10-CM

## 2024-02-05 DIAGNOSIS — G95.9 CERVICAL MYELOPATHY: Primary | ICD-10-CM

## 2024-02-05 DIAGNOSIS — M25.551 RIGHT HIP PAIN: ICD-10-CM

## 2024-02-05 PROCEDURE — 72120 X-RAY BEND ONLY L-S SPINE: CPT

## 2024-02-05 PROCEDURE — 72146 MRI CHEST SPINE W/O DYE: CPT

## 2024-02-05 PROCEDURE — 99214 OFFICE O/P EST MOD 30 MIN: CPT | Performed by: NEUROLOGICAL SURGERY

## 2024-02-05 PROCEDURE — 72141 MRI NECK SPINE W/O DYE: CPT

## 2024-02-05 RX ORDER — DULOXETIN HYDROCHLORIDE 30 MG/1
30 CAPSULE, DELAYED RELEASE ORAL DAILY
COMMUNITY
Start: 2024-01-01

## 2024-02-05 RX ORDER — ARIPIPRAZOLE 2 MG/1
TABLET ORAL
COMMUNITY
Start: 2024-01-23

## 2024-02-05 NOTE — PROGRESS NOTES
NAME: KADE CASH   DOS: 2024  : 1955  PCP: Felix Dahl MD    Chief Complaint:    Chief Complaint   Patient presents with    Back Pain     Right hip pain    Leg Pain     Bilateral shin pain intermittently       History of Present Illness:  69 y.o. female   I saw this 69-year-old fully well-known to me for history of complex mobility issues she has been longstanding in a wheelchair and when she first came into my care she had significant cervical spondylitic myelopathy.  She underwent a complex corpectomy with me with a history of dural removal secondary to advanced OPLL and made a good recovery from that without apparent complications    Then she ended undergoing an L4-5 minimally invasive laminectomy for treatment of a presumed spondylolisthesis and central stenosis that also seem to help she is made about a year she is quite active and motivated and rhe presents with a history of right-sided hip pain it is in the anterior lateral hip around the iliotibial band she is quite uncomfortable from that.  She denies bowel bladder incontinence or changes denies any upper extremity symptoms and has not been really ambulatory in the last 3 weeks per her report    PMHX  Allergies:  Allergies   Allergen Reactions    Percocet [Oxycodone-Acetaminophen] Hives    Lanolin Rash     Medications    Current Outpatient Medications:     ARIPiprazole (ABILIFY) 2 MG tablet, take 1 tablet by mouth every day in the morning for 30 days, Disp: , Rfl:     celecoxib (CeleBREX) 200 MG capsule, Take 1 capsule by mouth Daily., Disp: , Rfl: 11    CloNIDine (CATAPRES) 0.1 MG tablet, Take 1 tablet by mouth every night at bedtime., Disp: , Rfl: 0    DULoxetine (CYMBALTA) 30 MG capsule, Take 1 capsule by mouth Daily., Disp: , Rfl:     latanoprost (XALATAN) 0.005 % ophthalmic solution, Administer 1 drop to both eyes Every Night., Disp: , Rfl: 11    methocarbamol (ROBAXIN) 750 MG tablet, TAKE 1 TABLET BY MOUTH 3 (THREE) TIMES A DAY  FOR 90 DOSES FOR BACK PAIN FROM SPASM, Disp: 90 tablet, Rfl: 0    pregabalin (LYRICA) 150 MG capsule, Take 1 capsule by mouth every night at bedtime., Disp: , Rfl:     ROPINIROLE HCL PO, Take 1.5 mg by mouth Every Night., Disp: , Rfl:     traMADol (ULTRAM) 50 MG tablet, Take 1 tablet by mouth 2 (Two) Times a Day., Disp: , Rfl:   Past Medical History:  Past Medical History:   Diagnosis Date    Anxiety and depression     Arthritis     Back problem     Bowel incontinence     at night     Cerebral palsy     Diverticulosis     Eczema     Esophageal spasm     Glaucoma suspect     Hearing loss     History of seizure     as a child    History of use of hearing aid in right ear     Hypertension     IBS (irritable bowel syndrome)     Low back pain 2019    L2-3, L4,5    Mild hearing loss     PONV (postoperative nausea and vomiting)     Pre-diabetes     Wears glasses      Past Surgical History:  Past Surgical History:   Procedure Laterality Date    ACHILLES TENDON LENGTHENING      on both heels    ANTERIOR CERVICAL DISCECTOMY W/ FUSION N/A 01/23/2019    Procedure: CERVICAL CORPECTOMY C5-C6 ACDF C4-7;  Surgeon: Jordon Villarreal MD;  Location:  [facility];  Service: Neurosurgery    BACK SURGERY  March 2022    BUNIONECTOMY Bilateral     CHOLECYSTECTOMY      COLONOSCOPY      CYST REMOVAL      DILATATION AND CURETTAGE      x2    ENDOSCOPY      LUMBAR LAMINECTOMY DISCECTOMY DECOMPRESSION N/A 11/10/2021    Procedure: LUMBAR LAMINECTOMY L4-5;  Surgeon: Jordon Villarreal MD;  Location:  [facility];  Service: Neurosurgery;  Laterality: N/A;    NECK SURGERY  January 2019    SPINAL FUSION  January 2019    TRIGGER POINT INJECTION  December 2018    Yes, if nerve root injections (3)     Social Hx:  Social History     Tobacco Use    Smoking status: Never    Smokeless tobacco: Never   Vaping Use    Vaping Use: Never used   Substance Use Topics    Alcohol use: No    Drug use: No     Family Hx:  Family History   Problem Relation Age of Onset     Arthritis Mother     Asthma Mother     Cancer Mother         Breast    Hypertension Mother     Miscarriages / Stillbirths Mother     Thyroid disease Mother     Cancer Father         Skin    Parkinsonism Father     Alcohol abuse Father     Anxiety disorder Father     Depression Father     Diabetes Brother     Hypertension Brother     Cancer Maternal Aunt         Lung and Brain    Vision loss Maternal Aunt     Diabetes Maternal Uncle     Heart disease Paternal Grandfather     Alcohol abuse Paternal Uncle     COPD Paternal Uncle     Arthritis Maternal Aunt     Arthritis Brother     Depression Brother     Cancer Maternal Aunt         Lung    Cancer Paternal Aunt         Breast     Review of Systems:        Review of Systems   Constitutional:  Positive for activity change and fatigue. Negative for appetite change, chills, diaphoresis, fever and unexpected weight change.   HENT:  Positive for hearing loss. Negative for congestion, dental problem, drooling, ear discharge, ear pain, facial swelling, mouth sores, nosebleeds, postnasal drip, rhinorrhea, sinus pressure, sneezing, sore throat, tinnitus, trouble swallowing and voice change.    Eyes:  Negative for photophobia, pain, discharge, redness, itching and visual disturbance.   Respiratory:  Negative for apnea, cough, choking, chest tightness, shortness of breath, wheezing and stridor.    Cardiovascular:  Negative for chest pain, palpitations and leg swelling.   Gastrointestinal:  Negative for abdominal distention, abdominal pain, anal bleeding, blood in stool, constipation, diarrhea, nausea, rectal pain and vomiting.   Endocrine: Negative for cold intolerance, heat intolerance, polydipsia, polyphagia and polyuria.   Genitourinary:  Positive for urgency. Negative for decreased urine volume, difficulty urinating, dysuria, enuresis, flank pain, frequency, genital sores and hematuria.   Musculoskeletal:  Positive for back pain, gait problem and myalgias. Negative for  arthralgias, joint swelling, neck pain and neck stiffness.   Skin:  Negative for color change, pallor, rash and wound.   Allergic/Immunologic: Negative for environmental allergies, food allergies and immunocompromised state.   Neurological:  Positive for weakness. Negative for dizziness, tremors, seizures, syncope, facial asymmetry, speech difficulty, light-headedness, numbness and headaches.   Hematological:  Negative for adenopathy. Does not bruise/bleed easily.   Psychiatric/Behavioral:  Positive for dysphoric mood. Negative for agitation, behavioral problems, confusion, decreased concentration, hallucinations, self-injury, sleep disturbance and suicidal ideas. The patient is nervous/anxious. The patient is not hyperactive.    All other systems reviewed and are negative.       I have reviewed this note template and all pertinent parts of the review of systems social, family history, surgical history and medication list  Physical Examination:  Vitals:    02/05/24 1139   Temp: 97.3 °F (36.3 °C)      General Appearance:   Well developed, well nourished, well groomed, alert, and cooperative.  Neurological examination:  Neurologic Exam  Vital signs were reviewed and documented in the chart  Patient appeared in good neurologic function with normal comprehension fluent speech  Mood and affect are normal  Sense of smell deferred  Cranial nerves appear grossly intact    She is strong in her upper extremities with trace Raymond's bilaterally    She has good  strength arms are moving well    She has no evidence of any issues with her back incision      Muscle bulk and tone normal upper extremities  5 out of 5 strength no motor drift upper extremities  She is in a wheelchair    She is able to mount 3-4 strength in her iliopsoas bilaterally almost 4-5 her quadriceps fire and she has some degree of mild dorsiflexor 2 out of 5 she can double stand with assist by her reports I did not do this in the examination room but she  is able to push up with assistance in her lower extremities    She has no clonus she has trace reflexes without evidence of suspended thoracic sensory level      Review of Imaging/DATA:  I personally reviewed an MRI cervical and thoracic I see no evidence of ongoing compressive pathology    Lumbar spinal films flexion-extension films were personally reviewed and interpreted demonstrates no evidence of high-grade listhesis old MRI of the lumbar spine appears quite patent there is disease at L1 to there is also spondylolisthesis likely brewing at L4-5  Diagnoses/Plan:    Ms. Beltre is a 69 y.o. female   1.  History of complex OPLL related cervical spondylitic myelopathy status post multilevel ACDF with good technical result as well as improvement in patient's cervical spondylitic symptoms and underlying see if neurologic chronic issues and cerebral palsy  2.  Status post minimally invasive laminectomy L4-5 with again improvement lower extremities  3.  Recent history of neurologic decline but however referral reports with examination that shows worsening weakness of isolated lower extremities with expected cervical and thoracic findings without evidence of high-grade critical compression  4.  Right-sided intrinsic hip dysfunction    Plan from a neurosurgical standpoint  1.  Cervical flexion-extension films  2.  Right-sided hip x-ray  3.  Aggressive physical therapy  4.  Lumbar MRI-she reportedly had 1 recently and we will review the studies if not we need to ascertain that    From a neurosurgical standpoint if films look okay and show noncompressive etiology perhaps a referral back to neurology for evaluation of her weakness may be in order/I explained the care plan to her as far as pain control I think it be reasonable to have her see one of our interventional pain management doctors to talk about pain management options.  If her symptoms continue to persist we could contemplate a cervical and lumbar myelogram depending  on her functional capacity as well as EMG nerve conduction study and neurology referral    Addendum I was able to personally reviewed and interpreted a MRI of the lumbar spine that shows worsening L2-3 disease with a far lateral disc component there is nothing that should be threatening her mobility but it likely accounts for her right anterior pain I suspect that she get better with a combination of therapy and injections we will try that first

## 2024-02-05 NOTE — TELEPHONE ENCOUNTER
CALLED PT TO INFORM THAT DR MORIN REVIEWED HER MOST RECENT LUMBAR MRI WHICH WAS OBTAINED AND SCANNED INTO THE CHART TODAY.  PER DR MORIN, IMAGING DOES NOT SHOW ANYTHING OF CONCERN.      PT WAS ADVISED PER DR MORIN TO ATTEND SOME PAIN MANAGEMENT, COMPLETE THE XRAYS AS DISCUSSED AND CALL OUR OFFICE AFTER INJECTIONS TO SCHEDULE A FOLLOW UP WITH DR. MORIN.     PT VERBALIZED UNDERSTANDING AND WAS THANKFUL FOR THE CALL.

## 2024-03-04 ENCOUNTER — OFFICE VISIT (OUTPATIENT)
Dept: PAIN MEDICINE | Facility: CLINIC | Age: 69
End: 2024-03-04
Payer: MEDICARE

## 2024-03-04 VITALS — BODY MASS INDEX: 33.8 KG/M2 | HEIGHT: 64 IN | WEIGHT: 198 LBS

## 2024-03-04 DIAGNOSIS — M16.11 PRIMARY OSTEOARTHRITIS OF RIGHT HIP: ICD-10-CM

## 2024-03-04 DIAGNOSIS — M48.062 LUMBAR STENOSIS WITH NEUROGENIC CLAUDICATION: ICD-10-CM

## 2024-03-04 DIAGNOSIS — M51.36 DDD (DEGENERATIVE DISC DISEASE), LUMBAR: ICD-10-CM

## 2024-03-04 DIAGNOSIS — Z91.81 AT HIGH RISK FOR FALLS: ICD-10-CM

## 2024-03-04 DIAGNOSIS — Z98.890 S/P DISKECTOMY: ICD-10-CM

## 2024-03-04 DIAGNOSIS — G80.1 SPASTIC DIPLEGIC CEREBRAL PALSY: ICD-10-CM

## 2024-03-04 DIAGNOSIS — M47.816 SPONDYLOSIS OF LUMBAR REGION WITHOUT MYELOPATHY OR RADICULOPATHY: ICD-10-CM

## 2024-03-04 PROCEDURE — 1125F AMNT PAIN NOTED PAIN PRSNT: CPT | Performed by: NURSE PRACTITIONER

## 2024-03-04 PROCEDURE — 99214 OFFICE O/P EST MOD 30 MIN: CPT | Performed by: NURSE PRACTITIONER

## 2024-03-04 PROCEDURE — 1159F MED LIST DOCD IN RCRD: CPT | Performed by: NURSE PRACTITIONER

## 2024-03-04 PROCEDURE — 1160F RVW MEDS BY RX/DR IN RCRD: CPT | Performed by: NURSE PRACTITIONER

## 2024-03-04 RX ORDER — DULOXETIN HYDROCHLORIDE 60 MG/1
1 CAPSULE, DELAYED RELEASE ORAL DAILY
COMMUNITY
Start: 2024-02-23

## 2024-03-04 NOTE — PROGRESS NOTES
"Chief Complaint: \"Right hip and thigh pain.\"      History of Present Illness:   Patient: Ms. Nikole Beltre, 69 y.o. female originally referred by Dr. Jordon Villarreal in consultation for intractable chronic lower back and hip pain.  She has a history of undergoing on January 23, 2019, she underwent C5 and C6 corpectomy with anterior cervical fusion C4-C7.  She has done well with resolution of her myelopathy. We last saw her in July 2021, when she underwent a bilateral L4-L5 transforaminal epidural steroid injection.  Unfortunately, this did not provide her with significant benefit, therefore eventually on 11/10/2021, she underwent an L4-L5 lumbar laminectomy and decompression with Dr. Villarreal.  Initially, she did very well with complete relief of her lower back and lower extremity pain.  More recently, she began to experience right hip pain that radiates into the anterior portion of her thigh for the past 6 months.  This has quite hindered her mobility.  Prior to this, she was very engaged in physical therapy and Her current pain radiates across her low back and immediatelswimming about 4 to 5 days/week. She has a history of cerebral palsy, and has weakness and spasticity at baseline.  She recently underwent neurosurgical follow-up consultation Dr. Jordon Villarreal on 02/05/2024, and was found not to be a surgical candidate at that time.  Dr. Villarreal discussed physical therapy and interventional pain management measures, and if her pain persists he recommended a cervical and lumbar myelogram.  A new MRI of the lumbar spine without contrast demonstrates multilevel facet hypertrophy, at L2-L3: Large broad-based disc protrusion creating moderate central spinal stenosis with facet hypertrophy and moderate bilateral neuroforaminal stenosis. At L4-L5: Disc bulge with facet hypertrophy contributing to mild neuroforaminal stenosis and moderate central spinal stenosis. L5-S1: Mild disc bulge with mild neuroforaminal stenosis, with " facet hypertrophy without significant spinal stenosis.she reports she has consulted with orthopedics, but it was felt she would not recover well from a hip replacement.  Patient has failed to obtain pain relief with conservative measures for more than 3 months including oral analgesics, physical therapy, ongoing physical therapist directed home exercise program, water therapy to name a few.   Pain description: constant pain with intermittent exacerbation, described as burning, soreness, sharp and stabbing sensation.   Radiation of pain: The radiates into the right hip and extends into the anterior and lateral portion of her right thigh stopping above the knee,  Pain intensity today: 4/10   Average pain intensity last week: 5/10  Pain intensity ranges from: 4/10 to 9/10  Aggravating factors: Pain increases with twisting, bending, standing longer than 5 minutes and ambulating more than 1-2 minutes.  Alleviating factors: Pain decreases with lying, sitting.     Associated symptoms:   Patient reports pain and weakness in the right thigh.  Denies numbness.  She also has chronic weakness and spasticity from her CP LT>RT.   Patient denies any new bladder or bowel problems other than some urgency.   Patient reports difficulties with her balance but denies recent falls within the last three months.   Pain interferes with regular activities, ADLs, and affects patient's quality of life  Pain interferes with general activities (ability to walk, stand, transition from different positions), perform activities of daily living  Pain interferes with sleep causing sleep fragmentation   Muscle spasms  Stiffness      Review of previous therapies and additional medical records:  Nikole Beltre has already failed the following measures, including:   Conservative measures: oral analgesics, opioids, physical therapy, ice and heat   Interventional measures: 11/07/2018: DxTx bilateral L4-L5 transforaminal epidural steroid injection  07/07/2021:  Bilateral L4-L5 transforaminal epidural steroid injection  Surgical measures:   01/23/2019: C5 and C6 corpectomy with anterior cervical fusion C4-C7 with Dr. Villarreal.  11/10/2021: L4-L5 lumbar laminectomy and decompression with Dr. Villarreal  Nikole Beltre underwent neurosurgical consultation with Dr. Villarreal on 02/05/2024, and was found not to be a surgical candidate.  Nikole Beltre presents with significant comorbidities including anxiety, engaged in treatment, obesity, hypertension, cerebral palsy, engaged in treatment.  In terms of current analgesics, Nikole Beltre takes: Lyrica, tramadol, Robaxin Celebrex.  Patient also takes Cymbalta and Abilify.  I have reviewed Ham Report is consistent to medication reconciliation.     Global Pain Scale 10-30  2018   11-29  2018  06-17  2021             Pain  9  6  6             Feelings  13  13  8             Clinical outcomes  15  18  11             Activities  20  8  23             GPS Total:  57  45  48                The [place] Back Pain Disability Scale  DATE 03-04 2024                 Sleep through the night 4                 Turn over in bed 4                 Get out of bed 4                 Make your bed 4                 Put on socks (pantyhose) 4                 Ride in a car 3                 Sit in a chair for several hours 4                 Stand up for 20-30 minutes 5                 Climb one flight of stairs 5                 Walk a few blocks (200-300 yards)  5                 Walk several miles 5                 Run one block (about 50 yards) 5                 Take food out of the refrigerator 4                 Reach up to high shelves 4                 Move a chair 5                 Pull or push heavy doors 5                 Bend over to clean the bathtub 4                 Throw a ball 3                 Carry two bags of groceries 5                 Lift and carry a heavy suitcase 5                 Total score 87                       Review of New Diagnostic Studies:    MRI  of the lumbar spine without contrast 1/5/2024:  L2-L3: Large broad-based disc protrusion creating moderate central spinal stenosis with facet hypertrophy and moderate bilateral neuroforaminal stenosis.  L3-L4: Disc bulge with a disc osteophyte complex formation with narrowing of the neuroforamen, mildly.  Facet hypertrophy.  No significant spinal stenosis.  L4-L5: Disc bulge with facet hypertrophy contributing to mild neuroforaminal stenosis and moderate central spinal stenosis.  L5-S1: Mild disc bulge with mild neuroforaminal stenosis, with facet hypertrophy without significant spinal stenosis.  Lumbar spine x-rays with flexion-extension views retrolisthesis of L2 on L3, no evidence of instability.  Right hip x-ray 2/8/2024: Moderate osteoarthritis of the right hip.    Previous Diagnostic Studies:   MRI of the cervical spine without contrast 5/7/2021: I have reviewed the images.  Postsurgical changes at C4-C7 from prior anterior fusion and corpectomy.  Multilevel spondylitic changes.  Adjacent level disease seen at C3-C4.  C2-C3: Mild disc osteophyte complex with mild anterior thecal sac effacement and mild and uncovertebral spurring and facet hypertrophy greater on the left, producing mild left neuroforaminal stenosis.  C3-C4: Moderate disc osteophyte complex with mild uncovertebral spurring and facet hypertrophy producing mild to moderate anterior thecal sac effacement.  Left paracentral osteophyte predominance minimally contacting and indenting the left Hemacord along with moderate right and mild to moderate left neuroforaminal stenosis.  C4-C5: Fused level with residual right disc osteophyte complex with mild to moderate spinal canal stenosis and mild left neuroforaminal stenosis.  C5-C6: Residual right disc osteophyte complex contacting and indenting the right Hemacord with moderate thecal sac effacement and spinal canal stenosis combined with mild to moderate right and left uncovertebral spurring and facet  hypertrophy producing mild to moderate right and mild left neuroforaminal stenosis.  C6-C7: Disc osteophyte complex formation with lateralization to the right, mild to moderate right paracentral spinal canal stenosis with mild to moderate left neuroforaminal stenosis.  C7-T1: No significant stenosis seen.  MRI of the thoracic spine without contrast 5/7/2021: I have reviewed the images.  MRI of the thoracic spine is unremarkable there is no significant spinal canal stenosis or neuroforaminal stenosis seen.  The canal is widely patent and overall normal thoracic cord and alignment.    MRI of the lumbar spine without contrast April 16, 2021: I have reviewed the images.  Minimal retrolisthesis of L2 on L3, otherwise no evidence of malalignment and vertebral body heights are well-maintained. Diffuse spondylitic changes, greatest at the L2-L3 and L4-L5 levels.  At the L2-L3 level there is a large disc bulge along with moderate to severe ligamentum flavum thickening and moderate facet hypertrophy producing moderate spinal canal stenosis on the right and subarticular recess narrowing.  At the L4-L5 level there is a large circumferential disc bulge with lateralization to the left, moderate to severe spinal canal stenosis on the left, with ligamentum flavum thickening and facet hypertrophy as well as left subarticular recess narrowing.  L1-L2: Mild circumferential disc bulge with mild to moderate ligamentum flavum thickening and facet hypertrophy producing mild anterior thecal sac effacement.  L2-L3: Large circumferential disc bulge with a with predominance to the right, with moderate to severe ligamentum flavum thickening and moderate facet hypertrophy producing moderate right spinal canal stenosis and mild right neuroforaminal stenosis.  L3-L4: Mild to moderate circumferential disc bulge along with moderate to severe ligamentum flavum thickening and moderate facet hypertrophy producing mild left anterior thecal sac  effacement and mild spinal canal stenosis with mild right neuroforaminal stenosis.  L4-L5: Large circumferential disc bulge with lateralization to the left combined with severe ligamentum flavum thickening and facet hypertrophy producing moderate to severe left paracentral spinal canal stenosis and left subarticular recess narrowing.  Mild right and moderate left neural foraminal stenosis.  L5-S1: Mild to moderate circumferential disc bulge, with mild bilateral neuroforaminal stenosis.  X-ray lumbar spine with flexion-extension views 4/16/2021: No evidence of instability seen.  Venous duplex of the bilateral lower extremities 4/20/2021: No evidence of DVT seen within the lower extremities.          Review of Systems   Constitutional:  Positive for activity change.   Genitourinary:  Positive for urgency.   Musculoskeletal:  Positive for arthralgias, back pain and joint swelling.   Neurological:  Positive for weakness.   Psychiatric/Behavioral:  Positive for sleep disturbance.    All other systems reviewed and are negative.     Patient Active Problem List   Diagnosis    Cerebral palsy    L4-5 Lumbar spinal stenosis with neurogenic claudication    Spondylosis of lumbar region without myelopathy or radiculopathy    DDD (degenerative disc disease), lumbar    Hypertrophy of ligamentum flavum    Greater trochanteric bursitis of left hip    At high risk for falls    Abnormality of gait due to impairment of balance    Cervical myelopathy    Myelopathy    S/P anterior cervical diskectomy of C4-5, 5-6, 6-7 as well as corpectomy 1/23/19    Stenosis, spinal, lumbar       Past Medical History:   Diagnosis Date    Anxiety and depression     Arthritis     Back problem     Bowel incontinence     at night     Cerebral palsy     Chronic pain disorder     Enough to limit me quite a bit...including sleep    Diverticulosis     Eczema     Esophageal spasm     Extremity pain     Legs stay swollen & tendons -- CP    Glaucoma suspect      Hearing loss     History of seizure     as a child    History of use of hearing aid in right ear     Hypertension     IBS (irritable bowel syndrome)     Joint pain     I was in my 40s before I started to have joint pain - CP    Low back pain 2019    L2-3, L4,5    Mild hearing loss     Osteoarthritis     No sure...but it was around 2015?    PONV (postoperative nausea and vomiting)     Pre-diabetes     Spinal stenosis 2019    Cervical surgery in 2019    Wears glasses          Past Surgical History:   Procedure Laterality Date    ACHILLES TENDON LENGTHENING      on both heels    ANTERIOR CERVICAL DISCECTOMY W/ FUSION N/A 01/23/2019    Procedure: CERVICAL CORPECTOMY C5-C6 ACDF C4-7;  Surgeon: Jordon Villarreal MD;  Location:  [facility];  Service: Neurosurgery    BACK SURGERY  March 2022    BUNIONECTOMY Bilateral     CHOLECYSTECTOMY      COLONOSCOPY      CYST REMOVAL      DILATATION AND CURETTAGE      x2    ENDOSCOPY      LUMBAR LAMINECTOMY DISCECTOMY DECOMPRESSION N/A 11/10/2021    Procedure: LUMBAR LAMINECTOMY L4-5;  Surgeon: Jordon Villarreal MD;  Location:  [facility];  Service: Neurosurgery;  Laterality: N/A;    NECK SURGERY  January 2019    ORTHOPEDIC SURGERY  Not sure - Dr. Giraldo    Bone spurs on right great toe    SPINAL FUSION  January 2019    TRIGGER POINT INJECTION  December 2018    Yes, if nerve root injections (3)         Family History   Problem Relation Age of Onset    Arthritis Mother     Asthma Mother     Cancer Mother         Breast    Hypertension Mother     Miscarriages / Stillbirths Mother     Thyroid disease Mother     GI problems Mother     Osteoporosis Mother     Cancer Father         Skin    Parkinsonism Father     Alcohol abuse Father     Anxiety disorder Father     Depression Father     Diabetes Brother     Hypertension Brother     GI problems Brother     Cancer Maternal Aunt         Lung and Brain    Vision loss Maternal Aunt     Diabetes Maternal Uncle     Heart disease Paternal  "Grandfather     Coronary artery disease Paternal Grandfather     Alcohol abuse Paternal Uncle     COPD Paternal Uncle     Arthritis Maternal Aunt     Arthritis Brother     Depression Brother     Cancer Maternal Aunt         Lung    Cancer Paternal Aunt         Breast         Social History     Socioeconomic History    Marital status:    Tobacco Use    Smoking status: Never    Smokeless tobacco: Never   Vaping Use    Vaping status: Never Used   Substance and Sexual Activity    Alcohol use: No    Drug use: No    Sexual activity: Not Currently     Partners: Male     Birth control/protection: None           Current Outpatient Medications:     ARIPiprazole (ABILIFY) 2 MG tablet, take 1 tablet by mouth every day in the morning for 30 days, Disp: , Rfl:     celecoxib (CeleBREX) 200 MG capsule, Take 1 capsule by mouth Daily., Disp: , Rfl: 11    CloNIDine (CATAPRES) 0.1 MG tablet, Take 1 tablet by mouth 2 (Two) Times a Day., Disp: , Rfl: 0    DULoxetine (CYMBALTA) 30 MG capsule, Take 1 capsule by mouth Daily., Disp: , Rfl:     DULoxetine (CYMBALTA) 60 MG capsule, Take 1 capsule by mouth Daily., Disp: , Rfl:     latanoprost (XALATAN) 0.005 % ophthalmic solution, Administer 1 drop to both eyes Every Night., Disp: , Rfl: 11    methocarbamol (ROBAXIN) 750 MG tablet, TAKE 1 TABLET BY MOUTH 3 (THREE) TIMES A DAY FOR 90 DOSES FOR BACK PAIN FROM SPASM, Disp: 90 tablet, Rfl: 0    pregabalin (LYRICA) 150 MG capsule, Take 1 capsule by mouth every night at bedtime., Disp: , Rfl:     ROPINIROLE HCL PO, Take 0.75 mg by mouth Every Night., Disp: , Rfl:     traMADol (ULTRAM) 50 MG tablet, Take 1 tablet by mouth As Needed., Disp: , Rfl:       Allergies   Allergen Reactions    Percocet [Oxycodone-Acetaminophen] Hives    Lanolin Rash    Other Rash     Darvocet         Ht 162.6 cm (64\")   Wt 89.8 kg (198 lb)   BMI 33.99 kg/m²       Physical Exam:  Constitutional: Patient is oriented to person, place, and time. Patient appears " well-developed and well-nourished.   HEENT: Head: Normocephalic and atraumatic. Eyes: Conjunctivae and lids are normal. Pupils: Equal, round, reactive to light.   Neck: Trachea normal. Neck supple. No JVD present.   Pulmonary Respiratory effort: No increased work of breathing or signs of respiratory distress.   Peripheral vascular exam: Posterior tibialis: right 2+ and left 2+. Dorsalis pedis: right 2+ and left 2+. 1+ edema.   Musculoskeletal   Gait and station: Gait evaluation demonstrated spastic gait limited ambulation  Lumbar spine: Passive and active range of motion are appropriate for her condition with minimal pain. Extension and rotation of the lumbar spine increased and reproduced some pain. Lumbar facet joint loading maneuvers are equivocal.  Darek and Gaenslen's tests are deferred (spasticity in adductors)  Piriformis maneuvers are negative   Palpation of the bilateral ischial tuberosities, unrevealing   Palpation of the bilateral greater trochanter, reveals tenderness on the right  Examination of the Iliotibial band: reveals tenderness on the right  Hip joints: The range of motion of the hip joints is limited secondary to spasticity and tightness RT>LT  Neurological: Patient is alert and oriented to person, place, and time. Speech: speech is normal. Cortical function: Normal mental status.   Cranial nerves: Cranial nerves 2-12 intact.   Reflex Scores:  Right patellar: 3+  Left patellar: 3+  Right Achilles: 2+  Left Achilles: 2+  Motor strength: 5/5  Motor Tone: normal tone normal in the upper body. Spasticity mainly adductors, hamstrings.   Involuntary movements: none.   Superficial/Primitive Reflexes: primitive reflexes were absent.   Right Stern: absent  Left Stern: absent  Right ankle clonus: absent  Left ankle clonus: absent   Negative long tract signs. Straight leg raising test is negative. Femoral stretch sign is negative.   Sensation: No sensory loss. Sensory exam: intact to light touch,  intact pain and temperature sensation, intact vibration sensation and  normal proprioception.   Coordination: Normal finger to nose. Lower body limited due to impaired gait and balance   Skin and subcutaneous tissue: Skin is warm and intact. No rash noted. No cyanosis.   Psychiatric: Judgment and insight: Normal. Orientation to person, place and time: Normal. Recent and remote memory: Intact. Mood and affect: Normal.     ASSESSMENT:   1. Primary osteoarthritis of right hip    2. Lumbar stenosis with neurogenic claudication    3. DDD (degenerative disc disease), lumbar    4. Spondylosis of lumbar region without myelopathy or radiculopathy    5. At high risk for falls    6. S/P anterior cervical diskectomy of C4-5, 5-6, 6-7 as well as corpectomy 1/23/19    7. Spastic diplegic cerebral palsy        PLAN/MEDICAL DECISION MAKING: Ms. Nikole Beltre, 69 y.o. female originally referred by Dr. Jordon Villarreal in consultation for intractable chronic lower back and hip pain.  She has a history of undergoing on January 23, 2019, she underwent C5 and C6 corpectomy with anterior cervical fusion C4-C7.  She has done well with resolution of her myelopathy. We last saw her in July 2021, when she underwent a bilateral L4-L5 transforaminal epidural steroid injection.  Unfortunately, this did not provide her with significant benefit, therefore eventually on 11/10/2021, she underwent an L4-L5 lumbar laminectomy and decompression with Dr. Villarreal.  Initially, she did very well with complete relief of her lower back and lower extremity pain.  More recently, she began to experience right hip pain that radiates into the anterior portion of her thigh for the past 6 months.  This has quite hindered her mobility.  Prior to this, she was very engaged in physical therapy and Her current pain radiates across her low back and immediatelswimming about 4 to 5 days/week. She has a history of cerebral palsy, and has weakness and spasticity at baseline.   She recently underwent neurosurgical follow-up consultation Dr. Jordon Villarreal on 02/05/2024, and was found not to be a surgical candidate at that time.  Dr. Villarreal discussed physical therapy and interventional pain management measures, and if her pain persists he recommended a cervical and lumbar myelogram.  A new MRI of the lumbar spine without contrast demonstrates multilevel facet hypertrophy, at L2-L3: Large broad-based disc protrusion creating moderate central spinal stenosis with facet hypertrophy and moderate bilateral neuroforaminal stenosis. At L4-L5: Disc bulge with facet hypertrophy contributing to mild neuroforaminal stenosis and moderate central spinal stenosis. L5-S1: Mild disc bulge with mild neuroforaminal stenosis, with facet hypertrophy without significant spinal stenosis.hip x-rays reveal moderate right hip osteoarthritis.  She reports she has consulted with orthopedics, but it was felt she would not recover well from a hip replacement.  Patient has failed to obtain pain relief with conservative measures for more than 3 months including oral analgesics, physical therapy, ongoing physical therapist directed home exercise program, water therapy to name a few.  I had a lengthy conversation with Ms. Nikole WEBER Alexsander regarding her chronic pain condition and potential therapeutic options including risks, benefits, alternative therapies, to name a few.  Despite her diagnosis of CP, she has remained very active in the gym, water therapies, and activities outside of the home.  Her pain is hindering on most of her daily activities, and she would like to return to ambulation with assistive devices.  She is currently in a wheelchair most of the time.  Upon physical examination, it appears as though she could be dealing with 2 sources of pain, one due to her right hip osteoarthritis, and elements of lumbar radiculopathy.  I have reviewed all available patient's medical records as well as previous therapies as  referenced above.  Therefore, I have proposed the following plan:  1. Pharmacological measures: Reviewed. Discussed.   A. Patient Lyrica, tramadol, Robaxin Celebrex.  Patient also takes Cymbalta and Abilify.  2. Interventional pain management measures: Patient will be scheduled for a diagnostic right hip joint injection with local anesthetics.  If patient experiences significant reduction in her right hip pain, with increased range of motion, we will then proceed with a therapeutic right hip joint injection with local anesthetics and steroids.  If she does not obtain relief with diagnostic right hip joint injection, we will proceed with diagnostic and therapeutic right L2-L3 transforaminal epidural steroid injections.  We may repeat epidural depend on patient's outcome.  Patient will follow-up with Dr. Villarreal thereafter.   3. Long-term rehabilitation efforts:  A. The patient does not have a history of falls. I did complete a risk assessment for falls. Patient has risk factors. Fall precautions: Patient has been instructed regarding universal fall precautions, such as;   Using gait aids two crutches at the appropriate height at all times for ambulation or wheelchair  Removing all area rugs and coffee tables to create a safe environment at home  Ensure clean, dry floors  Wearing supportive footwear and properly fitting clothing  Ensure bed/chair is appropriate height and patient's feet can touch the floor  Using a shower transfer bench  Using walk-in shower and having shower safety bars installed  Ensure proper lighting, minimize glare  Have nightlights operational and in use  Participation in an exercise program for gait training, balance training and strength  Ensure proper compliance and organization of medications to avoid errors   Avoid use of over the counter sedatives and alcohol consumption  Ensure easy access to call bell, glasses, TV control, telephone  Ensure glasses/hearing aids are in use or close by (on  top of night table)  B.   Patient will continue a comprehensive physical therapy program for water therapy, therapeutic exercise, core strengthening, gait and balance training, myofascial release, cupping and dry needling   C.   Resume water therapy and swimming  D.   Contrast therapy: Apply ice-packs for 15-20 minutes, followed by heating pads for 15-20 minutes to affected area   4.  The patient and her family have been instructed to contact my office with any questions or difficulties. The patient understands the plan and agrees to proceed accordingly.     Pain Medications               ARIPiprazole (ABILIFY) 2 MG tablet take 1 tablet by mouth every day in the morning for 30 days    celecoxib (CeleBREX) 200 MG capsule Take 1 capsule by mouth Daily.    DULoxetine (CYMBALTA) 30 MG capsule Take 1 capsule by mouth Daily.    DULoxetine (CYMBALTA) 60 MG capsule Take 1 capsule by mouth Daily.    methocarbamol (ROBAXIN) 750 MG tablet TAKE 1 TABLET BY MOUTH 3 (THREE) TIMES A DAY FOR 90 DOSES FOR BACK PAIN FROM SPASM    pregabalin (LYRICA) 150 MG capsule Take 1 capsule by mouth every night at bedtime.    traMADol (ULTRAM) 50 MG tablet Take 1 tablet by mouth As Needed.               Patient Care Team:  Felix Dahl MD as PCP - General (Family Medicine)  Damion Marie MD as Consulting Physician (Pain Medicine)  Jordon Villarreal MD as Consulting Physician (Neurosurgery)  Felix Dahl MD as Referring Physician (Family Medicine)     No orders of the defined types were placed in this encounter.        No future appointments.        [facility]

## 2024-03-05 DIAGNOSIS — M16.11 PRIMARY OSTEOARTHRITIS OF RIGHT HIP: Primary | ICD-10-CM

## 2024-03-26 ENCOUNTER — TELEPHONE (OUTPATIENT)
Dept: PAIN MEDICINE | Facility: CLINIC | Age: 69
End: 2024-03-26
Payer: MEDICARE

## 2024-03-26 DIAGNOSIS — M16.11 PRIMARY OSTEOARTHRITIS OF RIGHT HIP: Primary | ICD-10-CM

## 2024-03-26 NOTE — TELEPHONE ENCOUNTER
FOLLOW-UP CALL AFTER PROCEDURE  I spoke with Nikole Beltre regarding how they're feeling after yesterday's procedure with Dr. Marie. Patient reports that she is doing well.   Pt underwent a diagnostic procedure (see procedure note for details) on 3/25/2024 . Patient reported 100% pain relief at the time of after procedure exam with Dr. Marie. In addition, patient experienced significant functional improvement (performing activities without experiencing pain in comparison with examination prior to the procedure that reproduced pain with those activities).   Pain level before procedure: 9/10   Pain level after procedure: 0/10  Patient reports that the pain relief lasted for 24 hours. Today, Nikole Beltre reports 70% ongoing pain relief pain (pain level 3/10) OR  pain has returned to baseline after 24 hours   Patient denies side effects or complications  Patient does not have any questions or concerns at this time    Disposition:   I provided information regarding date and time of next procedure: 4/10/2024 with 0900 am arrival.   Reminded that the procedure is in the [address] [facility], 3rd floor.   I advised that patient must have a , and that the  must remain in the premises until after the procedure is completed and the patient is ready to be discharged.   Reminded NPO status: Nothing by mouth (eat or drink) for at least 8 hours prior to the procedure. Patient can take medications with a a sip of water.     Understanding of above information verbalized.    Vaccine status unknown

## 2024-03-28 ENCOUNTER — TELEPHONE (OUTPATIENT)
Dept: PAIN MEDICINE | Facility: CLINIC | Age: 69
End: 2024-03-28

## 2024-03-28 NOTE — TELEPHONE ENCOUNTER
Provider: DR. CRISTOBAL    Caller: KADE CASH    Relationship to Patient: SELF    Phone Number: [phone]    Reason for Call: PATIENT CALLED STATING HER LOWER BACK IS GIVING HER MORE PAIN THAN THE RIGHT HIP. REQUESTING A CALL BACK FROM SOMEONE IN CLINICAL TO DISCUSS THIS. PLEASE ADVISE.

## 2024-04-10 ENCOUNTER — OUTSIDE FACILITY SERVICE (OUTPATIENT)
Dept: PAIN MEDICINE | Facility: CLINIC | Age: 69
End: 2024-04-10
Payer: MEDICARE

## 2024-04-10 PROCEDURE — 20610 DRAIN/INJ JOINT/BURSA W/O US: CPT | Performed by: ANESTHESIOLOGY

## 2024-04-10 PROCEDURE — 99152 MOD SED SAME PHYS/QHP 5/>YRS: CPT | Performed by: ANESTHESIOLOGY

## 2024-04-10 PROCEDURE — 77002 NEEDLE LOCALIZATION BY XRAY: CPT | Performed by: ANESTHESIOLOGY

## 2024-04-18 ENCOUNTER — TELEPHONE (OUTPATIENT)
Dept: PAIN MEDICINE | Facility: CLINIC | Age: 69
End: 2024-04-18
Payer: MEDICARE

## 2024-04-18 NOTE — TELEPHONE ENCOUNTER
Provider: LEVAR    Caller: KADE CASH    Relationship to Patient: PATIENT    Pharmacy: NA    Phone Number: [phone]    Reason for Call: PATIENT IS WANTING TO BE SEEN FOR NEW PROBLEM -- LOW BACK PAIN    When was the patient last seen: 4.10.24

## 2024-04-22 NOTE — TELEPHONE ENCOUNTER
"Caller: Nikole Beltre \"Louise\"    Relationship to patient: Self    Best call back number:     Patient is needing: TO SCHEDULE A NEW PROBLEM APPT    "

## 2024-04-23 NOTE — TELEPHONE ENCOUNTER
Patient reports that her pain has continued.   After her last injection, she doesn't feel that the right hip joint is raking against the spurs, and the leg pain has subsided.   She reports pain 4/10, waking her up in the night.   She reports that she is struggling and has a lot of gait issue at this time.   Pain is across the back and does not extend down the legs.   Pain is not increased by bending while in wheelchair.   Walking on walker is bothersome to the back pain, showering causes an increase of pain the next day, scooting through the house on transport chair and sweeping increases pain.   Patient is unable to swim which normally helps with pain, unable to mow yard at [employer].     Patient goal is pain of 2/10  Dr. Villarreal has mentioned a SCS trial. Suggested that patient research stimulators based on MRI compatibility due to her ongoing medical conditions.

## 2024-04-24 ENCOUNTER — TELEPHONE (OUTPATIENT)
Dept: PAIN MEDICINE | Facility: CLINIC | Age: 69
End: 2024-04-24
Payer: MEDICARE

## 2024-04-24 DIAGNOSIS — M47.816 SPONDYLOSIS OF LUMBAR REGION WITHOUT MYELOPATHY OR RADICULOPATHY: Primary | ICD-10-CM

## 2024-04-24 NOTE — TELEPHONE ENCOUNTER
I called and spoke with patient to schedule her appointment for a procedure with Dr. Marie. Patient is scheduled for  her procedure on 05/08/2024

## 2024-04-24 NOTE — TELEPHONE ENCOUNTER
Spoke with patient and went over pain, imaging and treatment. We will proceed with lumbar MBBs. Patient was thankful for the call and no further needs expressed.

## 2024-05-08 ENCOUNTER — OUTSIDE FACILITY SERVICE (OUTPATIENT)
Dept: PAIN MEDICINE | Facility: CLINIC | Age: 69
End: 2024-05-08
Payer: MEDICARE

## 2024-05-09 ENCOUNTER — TELEPHONE (OUTPATIENT)
Dept: PAIN MEDICINE | Facility: CLINIC | Age: 69
End: 2024-05-09
Payer: MEDICARE

## 2024-05-09 NOTE — TELEPHONE ENCOUNTER
Caller: KADE   Relationship to Patient: SELF  Phone Number: [phone] (home)     Reason for Call: PATIENT STATES THAT WHEN SHE LEFT HER PROCEDURE HER PAIN WAS AROUND A LEVEL 3 AND NOW ITS BACK TO A LEVEL 7 ASKING WHAT THE NEXT STEPS ARE

## 2024-05-13 DIAGNOSIS — M47.816 SPONDYLOSIS OF LUMBAR REGION WITHOUT MYELOPATHY OR RADICULOPATHY: Primary | ICD-10-CM

## 2024-05-13 NOTE — TELEPHONE ENCOUNTER
FOLLOW-UP CALL AFTER PROCEDURE  I spoke with Nikole Beltre regarding how they're feeling after yesterday's procedure with Dr. Marie. Patient reports that  she is doing well.   Pt underwent a diagnostic procedure (see procedure note for details) on 5/8/2024 . Patient reported 100% pain relief at the time of after procedure exam with Dr. Marie. In addition, patient experienced significant functional improvement (performing activities without experiencing pain in comparison with examination prior to the procedure that reproduced pain with those activities).   Pain level before procedure: 8/10   Pain level after procedure: 0/10  Patient reports that the pain relief lasted for 24 hours. Today, Nikole Beltre reports 40% ongoing pain relief pain (pain level 6/10) OR  pain has returned to baseline after 24 hours   Patient denies side effects or complications  Patient does not have any questions or concerns at this time    Disposition:   I provided information regarding date and time of next procedure: 6/12/2024 with 0700 am arrival .   Reminded that the procedure is at [address] [place] [address].  I advised that patient must have a , and that the  must remain in the premises until after the procedure is completed and the patient is ready to be discharged.   Reminded NPO status: Nothing by mouth (eat or drink) for at least 8 hours prior to the procedure. Patient can take medications with a a sip of water.     Understanding of above information verbalized.

## 2024-05-15 ENCOUNTER — TELEPHONE (OUTPATIENT)
Dept: PAIN MEDICINE | Facility: CLINIC | Age: 69
End: 2024-05-15
Payer: MEDICARE

## 2024-05-15 NOTE — TELEPHONE ENCOUNTER
Provider: DR.LUIS CRISTOBAL    Caller: KADE CASH    Relationship to Patient: SELF    Pharmacy: [facility] [phone]    Phone Number: [phone]    Reason for Call:  PATIENT IS SCHEDULED FOR AN OUT-PATIENT PROCEDURE ON 6/12/24. PATIENT SAYS HER PAIN LEVEL IS 5-6/10 AND IS REQUESTING SOMETHING FOR PAIN UNTIL SHE HAS PROCEDURE.    When was the patient last seen: 5/8/24

## 2024-05-15 NOTE — TELEPHONE ENCOUNTER
I spoke with patient to r/s her procedure for a earlier date by  due to patient is in so much pain. That patient is scheduled for 05/22/23 at the [facility].

## 2024-05-22 ENCOUNTER — OUTSIDE FACILITY SERVICE (OUTPATIENT)
Dept: PAIN MEDICINE | Facility: CLINIC | Age: 69
End: 2024-05-22
Payer: MEDICARE

## 2024-05-22 PROCEDURE — 64494 INJ PARAVERT F JNT L/S 2 LEV: CPT | Performed by: ANESTHESIOLOGY

## 2024-05-22 PROCEDURE — 64493 INJ PARAVERT F JNT L/S 1 LEV: CPT | Performed by: ANESTHESIOLOGY

## 2024-05-23 ENCOUNTER — TELEPHONE (OUTPATIENT)
Dept: PAIN MEDICINE | Facility: CLINIC | Age: 69
End: 2024-05-23
Payer: MEDICARE

## 2024-05-23 NOTE — TELEPHONE ENCOUNTER
Caller: KADE CASH    Relationship to Patient: SELF      Phone Number: [phone]    Reason for Call: PT WANTED TO SHARE WITH DR. CRISTOBAL THAT SHE FEELS SO GOOD, SINCE SHE HAD HER NERVE BLOCK ON YESTERDAY. SHE STATES THAT THIS IS THE 1ST MORNING THAT SHE WAS ABLE TO STAND ABOUT 95 DEGREES OF STANDING UP STRAIGHT.    SHE STATES THAT SHE WAS ABLE TO PUT ON HER PANTS AND GO TO THE BATHROOM ON HER OWN. SHE STATES THAT SHE CRIED , BUT IT WAS A GOOD CRY. SHE JUST WANTED TO SHARE THIS WITH GOOD NEWS WITH THE OFFICE.  FOR THE LAST 3 MONTHS, SHE WAS NOT ABLE TO SLEEP WELL, BUT LAST NIGHT WAS A GOOD NIGHT AND SHE WOKE UP WITHOUT PAIN.     SHE STATES THAT SHE JUST WANTED TO SHARE. THIS IS A GAME CHANGER

## 2024-05-28 DIAGNOSIS — M47.816 SPONDYLOSIS OF LUMBAR REGION WITHOUT MYELOPATHY OR RADICULOPATHY: Primary | ICD-10-CM

## 2024-05-30 ENCOUNTER — TELEPHONE (OUTPATIENT)
Dept: PAIN MEDICINE | Facility: CLINIC | Age: 69
End: 2024-05-30
Payer: MEDICARE

## 2024-05-30 NOTE — TELEPHONE ENCOUNTER
FOLLOW-UP CALL AFTER PROCEDURE  I spoke with Nikole Beltre regarding how they're feeling after yesterday's procedure with Dr. Marie. Patient reports that  she is doing well.   Pt underwent a diagnostic procedure (see procedure note for details) on 5/22/2024 . Patient reported 100% pain relief at the time of after procedure exam with Dr. Marie. In addition, patient experienced significant functional improvement (performing activities without experiencing pain in comparison with examination prior to the procedure that reproduced pain with those activities).   Pain level before procedure: 9/10   Pain level after procedure: 0/10  Patient reports that the pain relief lasted for 24 hours. Today, Nikole Beltre reports 50% ongoing pain relief pain (pain level 5/10) OR  pain has returned to baseline after 2\4 hours   Patient denies side effects or complications  Patient does not have any questions or concerns at this time    Disposition:   I provided information regarding date and time of next procedure: 6/19/2024 at 0715 am arrival.   Reminded that the procedure is at the [place] [facility] by [address]; [address] [facility], [address]   I advised that patient must have a , and that the  must remain in the premises until after the procedure is completed and the patient is ready to be discharged.   Reminded NPO status: Nothing by mouth (eat or drink) for at least 8 hours prior to the procedure. Patient can take medications with a a sip of water.     Understanding of above information verbalized.

## 2024-06-18 NOTE — PLAN OF CARE
Goal Outcome Evaluation:           Progress: improving   VSS, on RA. Pain controlled with PO meds. Voiding well. Dressing is CDI. Slept somewhat well.    [Menarche Age ____] : age at menarche was [unfilled] [Definite ___ (Date)] : the last menstrual period was [unfilled] [Irregular Cycle Intervals] : are  irregular [Total Preg ___] : G[unfilled] [Live Births ___] : P[unfilled]  [Age At Live Birth ___] : Age at live birth: [unfilled] [History of Hormone Replacement Treatment] : has no history of hormone replacement treatment [FreeTextEntry6] : no [FreeTextEntry7] : no [FreeTextEntry8] : yes for a few months

## 2024-06-19 ENCOUNTER — OUTSIDE FACILITY SERVICE (OUTPATIENT)
Dept: PAIN MEDICINE | Facility: CLINIC | Age: 69
End: 2024-06-19
Payer: MEDICARE

## 2024-06-19 ENCOUNTER — DOCUMENTATION (OUTPATIENT)
Dept: PAIN MEDICINE | Facility: CLINIC | Age: 69
End: 2024-06-19

## 2024-06-19 PROCEDURE — 64635 DESTROY LUMB/SAC FACET JNT: CPT | Performed by: ANESTHESIOLOGY

## 2024-06-19 PROCEDURE — 99152 MOD SED SAME PHYS/QHP 5/>YRS: CPT | Performed by: ANESTHESIOLOGY

## 2024-06-19 PROCEDURE — 64636 DESTROY L/S FACET JNT ADDL: CPT | Performed by: ANESTHESIOLOGY

## 2024-06-19 NOTE — PROGRESS NOTES
[facility]      PROCEDURE DATE: 06/19/2024    PREOPERATIVE DIAGNOSES:  1. Spondylosis of lumbar region without myelopathy or radiculopathy   2. Lumbar stenosis with neurogenic claudication   3. DDD (degenerative disc disease), lumbar   4. Primary osteoarthritis of the right hip   5. At high risk for falls   6. Abnormality of gait due to impairment of balance   7. Spastic diplegic cerebral palsy (CMS/HCC)     POSTOPERATIVE DIAGNOSES:  1. Spondylosis of lumbar region without myelopathy or radiculopathy   2. Lumbar stenosis with neurogenic claudication   3. DDD (degenerative disc disease), lumbar   4. Primary osteoarthritis of the right hip   5. At high risk for falls   6. Abnormality of gait due to impairment of balance   7. Spastic diplegic cerebral palsy (CMS/HCC)     PROCEDURE: Bilateral lumbar medial branch rhizotomies at L2, L3, L4; for bilateral lumbar facet joints at L3-L4, and L4-L5     ANESTHESIA: Local anesthesia plus moderate IV sedation    COMPLICATIONS: None    EBL: 0    MEDICAL NECESSITY: A comprehensive evaluation, including history and physical exam and pertinent physiologic and functional assessment, was performed. The patient presents with intractable pain due to the diagnoses listed above. The patient has failed to respond to conservative modalities including the impact of patient's moderate-to-severe pain contributing to significant impairment in daily activities, ADLs, and a negative impact on quality of life, as referenced under HPI. Supporting diagnostic studies of patient's chronic pain condition have been reviewed. We have discussed using a stepwise approach starting with the shortest or least intense level of treatment, care, or service as determined by the extent required to diagnose and or treat a patient's condition. The treatments proposed are consistent with the patient's medical condition and are known to be as safe and effective by current guidelines and standard of care. Second set Dx  bilateral lumbar MBBs = % pain relief and functional improvement that lasted for 18-24 hours.     PROCEDURE SUMMARY: After explaining all the risks and benefits of the procedure, an informed consent was obtained.  The patient's surgical site was confirmed with the patient and marked in the holding room accordingly. The patient was transferred to the procedure room and placed on the operating room table in the prone position. Time out was completed. Noninvasive monitors were applied. Administration of moderate IV sedation was performed by a designated procedure room nurse, who monitored the patient's level of consciousness and physiological status. Pertinent information was reported on a sedation flow sheet, which is part of the patient's permanent medical records. Start sedation time: 08:26 AM. The patient's vital signs remained within normal limits. The lumbosacral spine area was prepped and draped in a sterile fashion. Using C-arm fluoroscopic guidance, the most superior and medial aspects of the transverse processes of L3, L4, L5, on both sides (targets) were identified. The skin and subcutaneous tissues overlying the targets at the above-mentioned levels were anesthetized with ten ml of 1% lidocaine followed by fourteen ml of 0.25% bupivacaine with epinephrine 1:200.000. Using 20-gauge [employer] cannulas with 1-cm uninsulated curved tips, the cannulas were advanced to contact the targets at the above-mentioned levels. AP, oblique and lateral x-ray views were obtained and confirmed appropriate cannula placement. X-rays were obtained and scanned in the patient's chart. Aspiration was negative for blood and/or CSF. Sensory thresholds were obtained at 0.25 millivolts, and motor thresholds at 0.5 millivolts. Motor and sensory stimulation were performed up to 3 mV. At no time was there any evidence of radicular stimulation or elicitation of paresthesia in a radicular distribution. Bilateral lumbar medial branch  rhizotomies were performed at L2, L3, L4; for bilateral lumbar facet joints at L3-L4 and L4-L5, at 80 degrees Celsius for 90 seconds per lumbar level. The cannulas were flushed with 1 ml of 0.25% bupivacaine with epinephrine 1:200.000 in 1% dextrose per level. Fluoroscopy was utilized using low-dose of radiation applying collimation, pulsed mode, and shielding following ALARA recommendations. Fluoroscopy time: 23 seconds. End sedation time: 08:36 AM. The patient tolerated the procedure well and without incident.  Upon completion of the procedure, the patient was transferred to the recovery room in stable condition. The patient was discharged from the [facility] Center neurologically intact and with appropriate discharge instructions. Pain level before procedure: 7/10. Pain level after procedure: 0/10.    PLAN:  Follow-up with CAT Mata in 20 weeks.  Pharmacological measures: See OV note.   Long-term rehabilitation efforts: See OV note.  The patient has been instructed to contact my office with any questions or difficulties. The patient understands the plan and agrees to proceed accordingly.          Signatures  Electronically signed by: Damion Marie M.D.; JUNE 19, 2024, 08:52 AM EST (Author)

## 2024-06-20 ENCOUNTER — TELEPHONE (OUTPATIENT)
Dept: PAIN MEDICINE | Facility: CLINIC | Age: 69
End: 2024-06-20

## 2024-06-20 NOTE — TELEPHONE ENCOUNTER
Provider: DR CRISTOBAL     Caller: PATIENT     Phone Number: [phone]    Reason for Call: PATIENT WOULD LIKE TO KNOW IF IT IS OKAY FOR HER TO BE ON A ZERO TURN  IN A LARGE OPEN FIELD FOR A COUPLE OF HOURS AT TIME.  PLEASE ADVISE. OKAY TO LEAVE A VOICEMAIL.   SHE IS ALSO GOING TO TALK WITH HER PHYSICAL THERAPIST ABOUT DOING THERAPY AGAIN FOR LEG STRENGTHENING.
